# Patient Record
Sex: MALE | Race: WHITE | NOT HISPANIC OR LATINO | Employment: OTHER | URBAN - METROPOLITAN AREA
[De-identification: names, ages, dates, MRNs, and addresses within clinical notes are randomized per-mention and may not be internally consistent; named-entity substitution may affect disease eponyms.]

---

## 2017-04-06 ENCOUNTER — HOSPITAL ENCOUNTER (INPATIENT)
Facility: HOSPITAL | Age: 57
LOS: 1 days | Discharge: HOME | DRG: 310 | End: 2017-04-07
Attending: EMERGENCY MEDICINE | Admitting: INTERNAL MEDICINE
Payer: COMMERCIAL

## 2017-04-06 DIAGNOSIS — R79.89 ELEVATED LFTS: ICD-10-CM

## 2017-04-06 DIAGNOSIS — R00.2 PALPITATIONS: ICD-10-CM

## 2017-04-06 DIAGNOSIS — I35.1 NONRHEUMATIC AORTIC VALVE INSUFFICIENCY: ICD-10-CM

## 2017-04-06 DIAGNOSIS — I35.9 AORTIC VALVE DISORDER: ICD-10-CM

## 2017-04-06 DIAGNOSIS — Z72.0 TOBACCO USE: ICD-10-CM

## 2017-04-06 DIAGNOSIS — I35.0 AORTIC STENOSIS, SEVERE: Chronic | ICD-10-CM

## 2017-04-06 DIAGNOSIS — I48.91 ATRIAL FIBRILLATION: ICD-10-CM

## 2017-04-06 DIAGNOSIS — F12.10 MARIJUANA ABUSE: ICD-10-CM

## 2017-04-06 DIAGNOSIS — R79.89 ELEVATED TROPONIN: ICD-10-CM

## 2017-04-06 DIAGNOSIS — F14.10 COCAINE ABUSE: ICD-10-CM

## 2017-04-06 DIAGNOSIS — Z78.9 ALCOHOL USE: ICD-10-CM

## 2017-04-06 DIAGNOSIS — I48.19 PERSISTENT ATRIAL FIBRILLATION WITH RVR: Primary | ICD-10-CM

## 2017-04-06 PROBLEM — R74.8 ELEVATED LIVER ENZYMES: Status: ACTIVE | Noted: 2017-04-06

## 2017-04-06 LAB
ALBUMIN SERPL BCP-MCNC: 3.2 G/DL
ALBUMIN SERPL BCP-MCNC: 4 G/DL
ALP SERPL-CCNC: 101 U/L
ALP SERPL-CCNC: 124 U/L
ALT SERPL W/O P-5'-P-CCNC: 100 U/L
ALT SERPL W/O P-5'-P-CCNC: 76 U/L
AMPHET+METHAMPHET UR QL: NEGATIVE
ANION GAP SERPL CALC-SCNC: 13 MMOL/L
AORTIC VALVE STENOSIS: ABNORMAL
AST SERPL-CCNC: 103 U/L
AST SERPL-CCNC: 73 U/L
BARBITURATES UR QL SCN>200 NG/ML: NEGATIVE
BASOPHILS # BLD AUTO: 0.1 K/UL
BASOPHILS NFR BLD: 1.1 %
BENZODIAZ UR QL SCN>200 NG/ML: NEGATIVE
BILIRUB DIRECT SERPL-MCNC: 0.5 MG/DL
BILIRUB SERPL-MCNC: 0.8 MG/DL
BILIRUB SERPL-MCNC: 1 MG/DL
BILIRUB UR QL STRIP: NEGATIVE
BNP SERPL-MCNC: 356 PG/ML
BUN SERPL-MCNC: 10 MG/DL
BZE UR QL SCN: NORMAL
CALCIUM SERPL-MCNC: 9.1 MG/DL
CANNABINOIDS UR QL SCN: NORMAL
CHLORIDE SERPL-SCNC: 96 MMOL/L
CLARITY UR REFRACT.AUTO: CLEAR
CO2 SERPL-SCNC: 28 MMOL/L
COLOR UR AUTO: YELLOW
CREAT SERPL-MCNC: 0.9 MG/DL
CREAT UR-MCNC: 78 MG/DL
DIASTOLIC DYSFUNCTION: NO
DIFFERENTIAL METHOD: ABNORMAL
EOSINOPHIL # BLD AUTO: 0.2 K/UL
EOSINOPHIL NFR BLD: 1.8 %
ERYTHROCYTE [DISTWIDTH] IN BLOOD BY AUTOMATED COUNT: 12.2 %
EST. GFR  (AFRICAN AMERICAN): >60 ML/MIN/1.73 M^2
EST. GFR  (NON AFRICAN AMERICAN): >60 ML/MIN/1.73 M^2
ESTIMATED PA SYSTOLIC PRESSURE: 44.99
ETHANOL SERPL-MCNC: <10 MG/DL
GLUCOSE SERPL-MCNC: 123 MG/DL
GLUCOSE UR QL STRIP: NEGATIVE
HCT VFR BLD AUTO: 41 %
HGB BLD-MCNC: 14.1 G/DL
HGB UR QL STRIP: NEGATIVE
INR PPP: 1.1
KETONES UR QL STRIP: ABNORMAL
LEUKOCYTE ESTERASE UR QL STRIP: NEGATIVE
LYMPHOCYTES # BLD AUTO: 4 K/UL
LYMPHOCYTES NFR BLD: 41.9 %
MCH RBC QN AUTO: 31.5 PG
MCHC RBC AUTO-ENTMCNC: 34.4 %
MCV RBC AUTO: 92 FL
METHADONE UR QL SCN>300 NG/ML: NEGATIVE
MONOCYTES # BLD AUTO: 1.3 K/UL
MONOCYTES NFR BLD: 13.2 %
NEUTROPHILS # BLD AUTO: 4 K/UL
NEUTROPHILS NFR BLD: 41.8 %
NITRITE UR QL STRIP: NEGATIVE
OPIATES UR QL SCN: NEGATIVE
PCP UR QL SCN>25 NG/ML: NEGATIVE
PH UR STRIP: 6 [PH] (ref 5–8)
PLATELET # BLD AUTO: 225 K/UL
PMV BLD AUTO: 9.8 FL
POTASSIUM SERPL-SCNC: 4.1 MMOL/L
PROT SERPL-MCNC: 6.7 G/DL
PROT SERPL-MCNC: 8.2 G/DL
PROT UR QL STRIP: NEGATIVE
PROTHROMBIN TIME: 11.8 SEC
RBC # BLD AUTO: 4.47 M/UL
RETIRED EF AND QEF - SEE NOTES: 55 (ref 55–65)
SODIUM SERPL-SCNC: 137 MMOL/L
SP GR UR STRIP: 1.02 (ref 1–1.03)
TOXICOLOGY INFORMATION: NORMAL
TRICUSPID VALVE REGURGITATION: ABNORMAL
TROPONIN I SERPL DL<=0.01 NG/ML-MCNC: 0.04 NG/ML
URN SPEC COLLECT METH UR: ABNORMAL
UROBILINOGEN UR STRIP-ACNC: <2 EU/DL
WBC # BLD AUTO: 9.48 K/UL

## 2017-04-06 PROCEDURE — 99291 CRITICAL CARE FIRST HOUR: CPT | Mod: 25

## 2017-04-06 PROCEDURE — 82570 ASSAY OF URINE CREATININE: CPT

## 2017-04-06 PROCEDURE — 84484 ASSAY OF TROPONIN QUANT: CPT | Mod: 91

## 2017-04-06 PROCEDURE — 25000003 PHARM REV CODE 250: Performed by: EMERGENCY MEDICINE

## 2017-04-06 PROCEDURE — 93010 ELECTROCARDIOGRAM REPORT: CPT | Mod: 77,,, | Performed by: INTERNAL MEDICINE

## 2017-04-06 PROCEDURE — 99406 BEHAV CHNG SMOKING 3-10 MIN: CPT

## 2017-04-06 PROCEDURE — 99223 1ST HOSP IP/OBS HIGH 75: CPT | Mod: ,,, | Performed by: INTERNAL MEDICINE

## 2017-04-06 PROCEDURE — 93005 ELECTROCARDIOGRAM TRACING: CPT

## 2017-04-06 PROCEDURE — 93306 TTE W/DOPPLER COMPLETE: CPT | Mod: 26,,, | Performed by: INTERNAL MEDICINE

## 2017-04-06 PROCEDURE — 80320 DRUG SCREEN QUANTALCOHOLS: CPT

## 2017-04-06 PROCEDURE — 81003 URINALYSIS AUTO W/O SCOPE: CPT

## 2017-04-06 PROCEDURE — 63600175 PHARM REV CODE 636 W HCPCS: Performed by: EMERGENCY MEDICINE

## 2017-04-06 PROCEDURE — 85025 COMPLETE CBC W/AUTO DIFF WBC: CPT

## 2017-04-06 PROCEDURE — 96365 THER/PROPH/DIAG IV INF INIT: CPT

## 2017-04-06 PROCEDURE — 99900035 HC TECH TIME PER 15 MIN (STAT)

## 2017-04-06 PROCEDURE — 83880 ASSAY OF NATRIURETIC PEPTIDE: CPT

## 2017-04-06 PROCEDURE — 96366 THER/PROPH/DIAG IV INF ADDON: CPT

## 2017-04-06 PROCEDURE — 25000003 PHARM REV CODE 250: Performed by: NURSE PRACTITIONER

## 2017-04-06 PROCEDURE — 84484 ASSAY OF TROPONIN QUANT: CPT

## 2017-04-06 PROCEDURE — 80076 HEPATIC FUNCTION PANEL: CPT

## 2017-04-06 PROCEDURE — 11000001 HC ACUTE MED/SURG PRIVATE ROOM

## 2017-04-06 PROCEDURE — 85610 PROTHROMBIN TIME: CPT

## 2017-04-06 PROCEDURE — 96375 TX/PRO/DX INJ NEW DRUG ADDON: CPT

## 2017-04-06 PROCEDURE — 80053 COMPREHEN METABOLIC PANEL: CPT

## 2017-04-06 PROCEDURE — 93306 TTE W/DOPPLER COMPLETE: CPT

## 2017-04-06 PROCEDURE — 36415 COLL VENOUS BLD VENIPUNCTURE: CPT

## 2017-04-06 PROCEDURE — 63600175 PHARM REV CODE 636 W HCPCS: Performed by: NURSE PRACTITIONER

## 2017-04-06 PROCEDURE — 93010 ELECTROCARDIOGRAM REPORT: CPT | Mod: ,,, | Performed by: NUCLEAR MEDICINE

## 2017-04-06 RX ORDER — CHLORDIAZEPOXIDE HYDROCHLORIDE 10 MG/1
10 CAPSULE, GELATIN COATED ORAL 3 TIMES DAILY
Status: DISCONTINUED | OUTPATIENT
Start: 2017-04-06 | End: 2017-04-07 | Stop reason: HOSPADM

## 2017-04-06 RX ORDER — DILTIAZEM HCL/D5W 125 MG/125
10 PLASTIC BAG, INJECTION (ML) INTRAVENOUS CONTINUOUS
Status: DISCONTINUED | OUTPATIENT
Start: 2017-04-06 | End: 2017-04-06

## 2017-04-06 RX ORDER — DILTIAZEM HYDROCHLORIDE 5 MG/ML
10 INJECTION INTRAVENOUS
Status: ACTIVE | OUTPATIENT
Start: 2017-04-06 | End: 2017-04-06

## 2017-04-06 RX ORDER — NAPROXEN SODIUM 220 MG/1
162 TABLET, FILM COATED ORAL
Status: COMPLETED | OUTPATIENT
Start: 2017-04-06 | End: 2017-04-06

## 2017-04-06 RX ORDER — ENOXAPARIN SODIUM 100 MG/ML
40 INJECTION SUBCUTANEOUS EVERY 24 HOURS
Status: DISCONTINUED | OUTPATIENT
Start: 2017-04-06 | End: 2017-04-07 | Stop reason: HOSPADM

## 2017-04-06 RX ORDER — ASPIRIN 325 MG
325 TABLET ORAL
Status: DISCONTINUED | OUTPATIENT
Start: 2017-04-06 | End: 2017-04-06

## 2017-04-06 RX ORDER — SODIUM CHLORIDE 9 MG/ML
1000 INJECTION, SOLUTION INTRAVENOUS
Status: COMPLETED | OUTPATIENT
Start: 2017-04-06 | End: 2017-04-06

## 2017-04-06 RX ORDER — DEXTROSE MONOHYDRATE AND SODIUM CHLORIDE 5; .45 G/100ML; G/100ML
INJECTION, SOLUTION INTRAVENOUS CONTINUOUS
Status: DISCONTINUED | OUTPATIENT
Start: 2017-04-06 | End: 2017-04-06

## 2017-04-06 RX ORDER — DILTIAZEM HYDROCHLORIDE 5 MG/ML
10 INJECTION INTRAVENOUS
Status: COMPLETED | OUTPATIENT
Start: 2017-04-06 | End: 2017-04-06

## 2017-04-06 RX ORDER — DILTIAZEM HYDROCHLORIDE 120 MG/1
120 CAPSULE, COATED, EXTENDED RELEASE ORAL EVERY 12 HOURS
Status: DISCONTINUED | OUTPATIENT
Start: 2017-04-06 | End: 2017-04-07 | Stop reason: HOSPADM

## 2017-04-06 RX ORDER — DILTIAZEM HYDROCHLORIDE 5 MG/ML
10 INJECTION INTRAVENOUS
Status: DISCONTINUED | OUTPATIENT
Start: 2017-04-06 | End: 2017-04-06

## 2017-04-06 RX ADMIN — CHLORDIAZEPOXIDE HYDROCHLORIDE 10 MG: 10 CAPSULE ORAL at 10:04

## 2017-04-06 RX ADMIN — DILTIAZEM HYDROCHLORIDE 10 MG/HR: 5 INJECTION INTRAVENOUS at 05:04

## 2017-04-06 RX ADMIN — SODIUM CHLORIDE 1000 ML: 0.9 INJECTION, SOLUTION INTRAVENOUS at 06:04

## 2017-04-06 RX ADMIN — ENOXAPARIN SODIUM 40 MG: 100 INJECTION SUBCUTANEOUS at 04:04

## 2017-04-06 RX ADMIN — SODIUM CHLORIDE 1000 ML: 0.9 INJECTION, SOLUTION INTRAVENOUS at 05:04

## 2017-04-06 RX ADMIN — NIFEDIPINE 120 MG: 10 CAPSULE ORAL at 04:04

## 2017-04-06 RX ADMIN — ASPIRIN 81 MG 162 MG: 81 TABLET ORAL at 05:04

## 2017-04-06 RX ADMIN — FOLIC ACID: 5 INJECTION, SOLUTION INTRAMUSCULAR; INTRAVENOUS; SUBCUTANEOUS at 10:04

## 2017-04-06 RX ADMIN — DILTIAZEM HYDROCHLORIDE 10 MG: 5 INJECTION INTRAVENOUS at 05:04

## 2017-04-06 NOTE — ED PROVIDER NOTES
"Encounter Date: 4/6/2017       History     Chief Complaint   Patient presents with    Palpitations     Pt states "feels like my heart is racing and beating erradic".     Review of patient's allergies indicates:  No Known Allergies  Patient is a 56 y.o. male presenting with the following complaint: chest pain. The history is provided by the patient.   Chest Pain   The current episode started 1 to 2 hours ago. Chest pain occurs constantly. The chest pain is unchanged. The pain is associated with drug use. The quality of the pain is described as pressure-like. The pain does not radiate. Chest pain is worsened by smoking. Primary symptoms include palpitations. Pertinent negatives for primary symptoms include no fever, no fatigue, no syncope, no shortness of breath, no cough, no wheezing, no abdominal pain, no nausea, no vomiting, no dizziness and no altered mental status.   The palpitations began 1 to 2 hours ago. The onset of the palpitations was sudden. An episode of palpitations lasts for more than 30 minutes. The palpitations have occurred 1 time(s). The palpitations occur while in bed. The palpitations did not occur with syncope, dizziness or shortness of breath.     Pertinent negatives for associated symptoms include no claudication, no diaphoresis, no lower extremity edema, no near-syncope, no numbness, no orthopnea, no paroxysmal nocturnal dyspnea and no weakness. He tried nothing for the symptoms. Risk factors include alcohol intake, male gender, being elderly, obesity, substance abuse and sedentary lifestyle (pt states he drank some alcohol and snorted some cocaine yesterday.  no hx of heart issues.  pt does not take any rx medications.  never had a cardiac work up.  ).   His past medical history is significant for stimulant use (snorted cocaine yesterday).   Pertinent negatives for past medical history include no aneurysm, no anxiety/panic attacks, no aortic aneurysm, no aortic dissection, no arrhythmia, no " bicuspid aortic valve, no CAD, no cancer, no congenital heart disease, no connective tissue disease, no COPD, no CHF, no diabetes, no DVT, no hyperlipidemia, no hypertension, no Marfan's syndrome, no MI, no mitral valve prolapse, no pacemaker, no PE, no PVD, no recent injury, no rheumatic fever, no seizures, no sickle cell disease, no sleep apnea, no spontaneous pneumothorax, no strokes, no thyroid problem, no TIA and no valve disorder.   His family medical history is significant for CAD, diabetes, heart disease and hypertension.   Pertinent negatives for family medical history include: no early MI, no PE, no sickle cell disease, no stroke, no sudden death and no TIA.   Procedure history is negative for cardiac catheterization, echocardiogram, persantine thallium, stress echo, stress thallium, exercise treadmill test and coronary CTA.     History reviewed. No pertinent past medical history.  Past Surgical History:   Procedure Laterality Date    APPENDECTOMY       History reviewed. No pertinent family history.  Social History   Substance Use Topics    Smoking status: Current Every Day Smoker     Packs/day: 1.50     Types: Cigarettes    Smokeless tobacco: None    Alcohol use Yes      Comment: daily     Review of Systems   Constitutional: Negative for diaphoresis, fatigue and fever.   HENT: Negative for sore throat.    Respiratory: Negative for cough, shortness of breath and wheezing.    Cardiovascular: Positive for chest pain and palpitations. Negative for orthopnea, claudication, syncope and near-syncope.   Gastrointestinal: Negative for abdominal pain, nausea and vomiting.   Genitourinary: Negative for dysuria.   Musculoskeletal: Negative for back pain.   Skin: Negative for rash.   Neurological: Negative for dizziness, seizures, syncope, weakness and numbness.   Hematological: Does not bruise/bleed easily.   All other systems reviewed and are negative.      Physical Exam   Initial Vitals   BP Pulse Resp Temp  SpO2   04/06/17 0452 04/06/17 0452 04/06/17 0452 04/06/17 0452 04/06/17 0452   206/112 124 20 97.7 °F (36.5 °C) 96 %     Physical Exam    Nursing note and vitals reviewed.  Constitutional: He appears well-developed and well-nourished. He is not diaphoretic. No distress.   HENT:   Head: Normocephalic and atraumatic.   Eyes: EOM are normal. Pupils are equal, round, and reactive to light. No scleral icterus.   Neck: Normal range of motion. Neck supple. No thyromegaly present.   Cardiovascular: Normal heart sounds and intact distal pulses. An irregularly irregular rhythm present. Tachycardia present.  Exam reveals no gallop and no friction rub.    No murmur heard.  A fib c rvr   Pulmonary/Chest: Effort normal and breath sounds normal. No respiratory distress. He has no wheezes. He has no rhonchi. He exhibits no tenderness.   Abdominal: Soft. Bowel sounds are normal. He exhibits no distension. There is no tenderness. There is no rebound and no guarding.   Musculoskeletal: Normal range of motion. He exhibits no edema or tenderness.   Lymphadenopathy:     He has no cervical adenopathy.   Neurological: He is alert and oriented to person, place, and time. He has normal strength. No cranial nerve deficit or sensory deficit.   Skin: Skin is warm and dry.   Psychiatric: He has a normal mood and affect. His behavior is normal. Judgment and thought content normal.         ED Course   Procedures  Labs Reviewed   CBC W/ AUTO DIFFERENTIAL - Abnormal; Notable for the following:        Result Value    RBC 4.47 (*)     MCH 31.5 (*)     Mono # 1.3 (*)     All other components within normal limits    Narrative:     PLEASE REVIEW ORDER START TIME BEFORE MARKING SPECIMEN  COLLECTED.   COMPREHENSIVE METABOLIC PANEL - Abnormal; Notable for the following:     Glucose 123 (*)      (*)      (*)     All other components within normal limits    Narrative:     PLEASE REVIEW ORDER START TIME BEFORE MARKING SPECIMEN  COLLECTED.    TROPONIN I - Abnormal; Notable for the following:     Troponin I 0.043 (*)     All other components within normal limits    Narrative:     PLEASE REVIEW ORDER START TIME BEFORE MARKING SPECIMEN  COLLECTED.   B-TYPE NATRIURETIC PEPTIDE - Abnormal; Notable for the following:      (*)     All other components within normal limits    Narrative:     PLEASE REVIEW ORDER START TIME BEFORE MARKING SPECIMEN  COLLECTED.   PROTIME-INR    Narrative:     PLEASE REVIEW ORDER START TIME BEFORE MARKING SPECIMEN  COLLECTED.   ALCOHOL,MEDICAL (ETHANOL)   ALCOHOL,MEDICAL (ETHANOL)    Narrative:     PLEASE REVIEW ORDER START TIME BEFORE MARKING SPECIMEN  COLLECTED.   URINALYSIS   DRUG SCREEN PANEL, URINE EMERGENCY     EKG Readings: (Independently Interpreted)   Initial Reading: No STEMI. Rhythm: Atrial Fibrillation. Heart Rate: 128. Ectopy: No Ectopy. Conduction: Normal. ST Segments: Normal ST Segments. T Waves: Normal. Axis: Normal. Clinical Impression: Atrial Fibrillation with RVR       Vitals:    04/06/17 0452 04/06/17 0515   BP: (!) 206/112 (!) 175/110   Pulse: (!) 124    Resp: 20    Temp: 97.7 °F (36.5 °C)    TempSrc: Oral    SpO2: 96%    Weight: 112 kg (247 lb)        Results for orders placed or performed during the hospital encounter of 04/06/17   CBC auto differential   Result Value Ref Range    WBC 9.48 3.90 - 12.70 K/uL    RBC 4.47 (L) 4.60 - 6.20 M/uL    Hemoglobin 14.1 14.0 - 18.0 g/dL    Hematocrit 41.0 40.0 - 54.0 %    MCV 92 82 - 98 fL    MCH 31.5 (H) 27.0 - 31.0 pg    MCHC 34.4 32.0 - 36.0 %    RDW 12.2 11.5 - 14.5 %    Platelets 225 150 - 350 K/uL    MPV 9.8 9.2 - 12.9 fL    Gran # 4.0 1.8 - 7.7 K/uL    Lymph # 4.0 1.0 - 4.8 K/uL    Mono # 1.3 (H) 0.3 - 1.0 K/uL    Eos # 0.2 0.0 - 0.5 K/uL    Baso # 0.10 0.00 - 0.20 K/uL    Gran% 41.8 38.0 - 73.0 %    Lymph% 41.9 18.0 - 48.0 %    Mono% 13.2 4.0 - 15.0 %    Eosinophil% 1.8 0.0 - 8.0 %    Basophil% 1.1 0.0 - 1.9 %    Differential Method Automated     Comprehensive metabolic panel   Result Value Ref Range    Sodium 137 136 - 145 mmol/L    Potassium 4.1 3.5 - 5.1 mmol/L    Chloride 96 95 - 110 mmol/L    CO2 28 23 - 29 mmol/L    Glucose 123 (H) 70 - 110 mg/dL    BUN, Bld 10 6 - 20 mg/dL    Creatinine 0.9 0.5 - 1.4 mg/dL    Calcium 9.1 8.7 - 10.5 mg/dL    Total Protein 8.2 6.0 - 8.4 g/dL    Albumin 4.0 3.5 - 5.2 g/dL    Total Bilirubin 1.0 0.1 - 1.0 mg/dL    Alkaline Phosphatase 124 55 - 135 U/L     (H) 10 - 40 U/L     (H) 10 - 44 U/L    Anion Gap 13 8 - 16 mmol/L    eGFR if African American >60.0 >60 mL/min/1.73 m^2    eGFR if non African American >60.0 >60 mL/min/1.73 m^2   Protime-INR   Result Value Ref Range    Prothrombin Time 11.8 9.0 - 12.5 sec    INR 1.1 0.8 - 1.2   Troponin I   Result Value Ref Range    Troponin I 0.043 (H) 0.000 - 0.026 ng/mL   B-Type natriuretic peptide (BNP)   Result Value Ref Range     (H) 0 - 99 pg/mL   Ethanol   Result Value Ref Range    Alcohol, Medical, Serum <10 <10 mg/dL         Imaging Results         X-Ray Chest 1 View (In process)           Medications   diltiaZEM injection 10 mg (10 mg Intravenous Not Given 4/6/17 0530)   diltiaZEM 125 mg in dextrose 5% 125 mL infusion (non-titrating) (10 mg/hr Intravenous New Bag 4/6/17 0551)   diltiaZEM injection 10 mg (10 mg Intravenous Not Given 4/6/17 0600)   aspirin chewable tablet 162 mg (162 mg Oral Given 4/6/17 0519)   diltiaZEM injection 10 mg (10 mg Intravenous Given 4/6/17 0515)   0.9%  NaCl infusion (1,000 mLs Intravenous New Bag 4/6/17 0534)     6:00 AM - Transfer of Care: Patient care transferred from Dr. Alicea to Dr. Bond, pending labs and reevaluation of Afib c RVR.      Pre-hypertension/Hypertension: The pt has been informed that they may have pre-hypertension or hypertension based on a blood pressure reading in the ED. I recommend that the pt call the PCP listed on their discharge instructions or a physician of their choice this week to arrange  f/u for further evaluation of possible pre-hypertension or hypertension.           New Prescriptions    No medications on file          ED Diagnosis  1. Persistent atrial fibrillation with RVR    2. Palpitations    3. Elevated LFTs    4. Cocaine abuse    5. Marijuana abuse    6. Elevated troponin    7. Alcohol use    8. Tobacco use           Medical Decision Making:   Initial Assessment:   Patient received from Dr Alicea at 0600 following discussion of all pertinent details of the encounter.  At this point the patients HR has improved into the 110-120s with the use of a cardizem drip though he remains in Afib.  He is asymptomatic at present.  We have discussed with him possible causes of his Afib emphasizing our concerns for his cocaine abuse and the additional risks of ACS associated with it.  Anticipating admission, we placed a call to hospital medicine at 0713 and are currently awaiting callback.  Michael Bond MD  7:31 AM    Second page placed.  Michael Bond MD  7:32 AM      ED Management:  All historical, clinical, radiographic, and laboratory findings were reviewed with the patient/family in detail along with the indications for transport to the facility in [place] in order to receive cardiac monitoring, cardiology consultation, and serial troponin levels along with Cardizem drip.  All remaining questions and concerns were addressed at this time and the patient/family agrees to proceed accordingly.  Similarly, all pertinent details of the encounter were discussed with Dr. Neely who agrees to receive the patient at [facility] for further care as outlined above.  The patient will be transferred by [facility] ambulance services secondary to a need for ongoing cardiac monitoring and IVF and IV Cardizem infusion en route.  Michael Bond MD  7:56 AM                       ED Course     Clinical Impression:   The primary encounter diagnosis was Persistent atrial fibrillation with RVR.  Diagnoses of Palpitations, Elevated LFTs, Cocaine abuse, Marijuana abuse, Elevated troponin, Alcohol use, and Tobacco use were also pertinent to this visit.          Michael Bond MD  04/06/17 0755

## 2017-04-06 NOTE — ED NOTES
"Pt c/o palpitations  & chest tightness/pressure x past 1 hr. Denies chest pain/tightness at this time.  Denies radiation of pain or SOB. States he has no medical hx & doesn't go to the doctor.  Provides hx of "did cocaine last night for the first time in 8 yrs". Will continue to monitorPatient verbally verified and Spelled Full Name and Date of Birth. LOC: The patient is awake, alert and aware of environment with an appropriate affect, the patient is oriented x 3 and speaking appropriately.  APPEARANCE: Patient resting comfortably and in no acute distress, patient is clean and well groomed, patient's clothing is properly fastened.  HEENT: Brief WNL  SKIN: warm & dry   MUSCULOSKELETAL: Brief WNL  RESPIRATORY: chest sounds clear arnulfo, hx 1.5 ppd smoker x 35-40 yrs.  Denies cough/cold  CARDIAC: AFibw/RVR at 134/min  GASTRO: Denies N or V  : Brief WNL  Peripheral Vasc: No peripheral edema but pt states "my feet & ankles swell up sometime"  NEURO: Brief WNL  PSYCH: Brief WNL  "

## 2017-04-06 NOTE — ED NOTES
Pt resting in bed. No complaints at present. SR up x 2, bed locked and low, call light in reach. Instructed to call for assistance. Will monitor.

## 2017-04-06 NOTE — ASSESSMENT & PLAN NOTE
-2. ETOH abuse  -Banana Bag  -librium 10 mg TID  -Folic Acid  -Multivitiamin  -thiamine  -ETOH counseling resources

## 2017-04-06 NOTE — SUBJECTIVE & OBJECTIVE
Past Medical History:   Diagnosis Date    Tobacco abuse        Past Surgical History:   Procedure Laterality Date    APPENDECTOMY         Review of patient's allergies indicates:  No Known Allergies    No current facility-administered medications on file prior to encounter.      Current Outpatient Prescriptions on File Prior to Encounter   Medication Sig    [DISCONTINUED] hydrocodone-acetaminophen 10-325mg (NORCO)  mg Tab Take 1 tablet by mouth every 4 (four) hours as needed for Pain.     Family History     Problem Relation (Age of Onset)    Valvular heart disease Father, Brother (27)        Social History Main Topics    Smoking status: Current Every Day Smoker     Packs/day: 1.50     Years: 40.00     Types: Cigarettes    Smokeless tobacco: Not on file    Alcohol use Yes      Comment: daily - 6 pack beer    Drug use: Yes     Special: Marijuana, Cocaine    Sexual activity: Not on file     Review of Systems   Constitutional: Negative.  Negative for activity change, appetite change, chills, diaphoresis, fatigue, fever and unexpected weight change.   HENT: Negative for congestion, ear discharge, ear pain, facial swelling, hearing loss, postnasal drip, sore throat, tinnitus, trouble swallowing and voice change.    Eyes: Negative for photophobia, pain, discharge, redness, itching and visual disturbance.   Respiratory: Positive for shortness of breath. Negative for cough, choking, chest tightness, wheezing and stridor.    Cardiovascular: Positive for chest pain and palpitations. Negative for leg swelling.   Gastrointestinal: Negative for abdominal distention, abdominal pain, blood in stool, constipation, diarrhea, nausea and vomiting.   Endocrine: Negative for cold intolerance, heat intolerance, polydipsia, polyphagia and polyuria.   Genitourinary: Negative for difficulty urinating, flank pain, frequency, hematuria and urgency.   Musculoskeletal: Negative for arthralgias, back pain, gait problem and myalgias.    Skin: Negative for color change, pallor, rash and wound.   Neurological: Negative for dizziness, tremors, seizures, syncope, facial asymmetry, speech difficulty, weakness, light-headedness, numbness and headaches.   Hematological: Negative for adenopathy. Does not bruise/bleed easily.   Psychiatric/Behavioral: Negative for agitation, confusion, hallucinations and suicidal ideas. The patient is not nervous/anxious.    All other systems reviewed and are negative.    Objective:     Vital Signs (Most Recent):  Temp: 98.3 °F (36.8 °C) (04/06/17 1500)  Pulse: 76 (04/06/17 1500)  Resp: 18 (04/06/17 1500)  BP: (!) 143/79 (04/06/17 1500)  SpO2: 97 % (04/06/17 1500) Vital Signs (24h Range):  Temp:  [97.7 °F (36.5 °C)-98.3 °F (36.8 °C)] 98.3 °F (36.8 °C)  Pulse:  [] 76  Resp:  [14-29] 18  SpO2:  [95 %-99 %] 97 %  BP: (101-206)/() 143/79     Weight: 112 kg (246 lb 14.6 oz)  Body mass index is 36.46 kg/(m^2).    Physical Exam   Constitutional: He is oriented to person, place, and time. He appears well-developed and well-nourished.   HENT:   Head: Normocephalic and atraumatic.   Nose: Nose normal.   Eyes: Conjunctivae and EOM are normal. Pupils are equal, round, and reactive to light.   Neck: Normal range of motion. Neck supple. No JVD present. No tracheal deviation present. No thyromegaly present.   Cardiovascular: Normal rate, regular rhythm, normal heart sounds and intact distal pulses.  Exam reveals no gallop and no friction rub.    No murmur heard.  Pulmonary/Chest: Effort normal. No stridor. No respiratory distress. He has wheezes. He has no rales. He exhibits no tenderness.   Diminished breathe sounds   Abdominal: Soft. Bowel sounds are normal. He exhibits no distension. There is no tenderness. There is no rebound and no guarding.   Musculoskeletal: Normal range of motion. He exhibits no edema, tenderness or deformity.   Neurological: He is alert and oriented to person, place, and time. He has normal  reflexes. No cranial nerve deficit. Coordination normal.   Skin: Skin is warm and dry. No rash noted. No erythema. No pallor.   Psychiatric: He has a normal mood and affect. His behavior is normal. Judgment and thought content normal.   Nursing note and vitals reviewed.       Significant Labs:   CBC:   Recent Labs  Lab 04/06/17  0500   WBC 9.48   HGB 14.1   HCT 41.0        CMP:   Recent Labs  Lab 04/06/17  0500      K 4.1   CL 96   CO2 28   *   BUN 10   CREATININE 0.9   CALCIUM 9.1   PROT 8.2   ALBUMIN 4.0   BILITOT 1.0   ALKPHOS 124   *   *   ANIONGAP 13   EGFRNONAA >60.0       Significant Imaging: I have reviewed all pertinent imaging results/findings within the past 24 hours.   Imaging Results         X-Ray Chest 1 View (Final result) Result time:  04/06/17 08:41:35    Final result by Nixon Huitron MD (04/06/17 08:41:35)    Impression:         No acute findings.      Electronically signed by: NIXON HUITRON MD  Date:     04/06/17  Time:    08:41     Narrative:    Exam: XR CHEST 1 VIEW,    Date:  04/06/17 05:09:02    History: palpitations    Comparison:  No prior  studies available for comparison.    Findings:     Lungs clear.  Cardiac silhouette mildly enlarged.  Vascular calcification of aorta.  No significant bony findings.

## 2017-04-06 NOTE — CONSULTS
"Consult Note  Cardiology     Consult Requested By: Dr. Bond  Reason for Consult: New onset afib with RVR    SUBJECTIVE:     History of Present Illness:  Patient is a 56 y.o. male with a PMHx of tobacco and ETOH abuse (drinks 4-6 beers daily) who presented to [facility] ED today with a chief complaint of palpitations that started shortly PTA. Patient reportedly had a party at his home last night and used cocaine and drank. He went to bed, but awoke six hours later, and felt his heart "racing". Other associated symptoms included mild chest pressure and SOB, and feeling flushed. Patient denied any associated lightheadedness, dizziness, near syncope, or syncope. EKG performed in ED revealed new onset afib with RVR and patient was subsequently transferred to [facility] for further evaluation and treatment. Cardiology consulted to assist with management. Patient seen and examined today, resting in bed. Reports palpitations and SOB have improved. Denies chest pain. No significant cardiac history. He does report familial history of CAD and valvular heart disease. States his father had an MI and subsequent valve replacement in his 40's. Two brothers, required valve replacements as well, although one did not survive the surgery. Chart reviewed. BP markedly elevated upon arrival to ED (206/112). Initial troponin 0.038. UDS + for cocaine and THC. 2D echo pending.     Review of patient's allergies indicates:  No Known Allergies    Past Medical History:   Diagnosis Date    Tobacco abuse      Past Surgical History:   Procedure Laterality Date    APPENDECTOMY       Family History   Problem Relation Age of Onset    Valvular heart disease Father      66 yo    Heart attacks under age 50 Father 42    Valvular heart disease Brother 27     valve Replacement surgery    Valvular heart disease Brother 62     valvue Replacement     Social History   Substance Use Topics    Smoking status: Current Every Day Smoker     Packs/day: 1.50     " Years: 40.00     Types: Cigarettes    Smokeless tobacco: None    Alcohol use Yes      Comment: daily - 6 pack beer        Review of Systems:  Constitutional: negative  Eyes: negative  Ears, nose, mouth, throat, and face: negative  Respiratory: +SOB  Cardiovascular: +palpitations, mild chest pressure  Gastrointestinal: negative  Genitourinary:negative  Hematologic/lymphatic: negative  Musculoskeletal:negative,   Neurological: negative  Behavioral/Psych: negative  Endocrine: negative  Allergic/Immunologic: negative    OBJECTIVE:     Vital Signs (Most Recent)  Temp: 98.3 °F (36.8 °C) (04/06/17 1500)  Pulse: 76 (04/06/17 1500)  Resp: 18 (04/06/17 1500)  BP: (!) 143/79 (04/06/17 1500)  SpO2: 97 % (04/06/17 1500)    Vital Signs Range (Last 24H):  Temp:  [97.7 °F (36.5 °C)-98.3 °F (36.8 °C)]   Pulse:  []   Resp:  [14-29]   BP: (101-206)/()   SpO2:  [95 %-99 %]     Current Facility-Administered Medications   Medication    diltiaZEM 24 hr capsule 120 mg    diltiaZEM injection 10 mg    diltiaZEM injection 10 mg    enoxaparin injection 40 mg    [START ON 4/7/2017] sodium chloride 0.9% 1,000 mL with mvi, adult no.4 with vit K 3,300 unit- 150 mcg/10 mL 10 mL, thiamine 100 mg, folic acid 1 mg infusion     No current facility-administered medications on file prior to encounter.      Current Outpatient Prescriptions on File Prior to Encounter   Medication Sig    [DISCONTINUED] hydrocodone-acetaminophen 10-325mg (NORCO)  mg Tab Take 1 tablet by mouth every 4 (four) hours as needed for Pain.       Physical Exam:  General appearance: alert, appears stated age and cooperative  Head: Normocephalic, without obvious abnormality, atraumatic  Neck: no adenopathy, no carotid bruit, no JVD  Lungs:  clear to auscultation bilaterally  Chest wall: no tenderness  Heart: irregularly irregular rate and rhythm S1, S2 normal, +systolic murmur ULSB  Abdomen: soft, non-tender; bowel sounds normal; no masses,  no  organomegaly  Extremities: extremities normal, atraumatic, no cyanosis or edema  Skin: Skin color, texture, turgor normal. No rashes or lesions  Neurologic: Grossly normal, AAO x 3    Laboratory:  Chemistry:   Lab Results   Component Value Date     04/06/2017    K 4.1 04/06/2017    CL 96 04/06/2017    CO2 28 04/06/2017    BUN 10 04/06/2017    CREATININE 0.9 04/06/2017    CALCIUM 9.1 04/06/2017     Cardiac Markers:   Lab Results   Component Value Date    TROPONINI 0.038 (H) 04/06/2017     Cardiac Markers (Last 3):   Lab Results   Component Value Date    TROPONINI 0.038 (H) 04/06/2017    TROPONINI 0.043 (H) 04/06/2017     CBC:   Lab Results   Component Value Date    WBC 9.48 04/06/2017    HGB 14.1 04/06/2017    HCT 41.0 04/06/2017    MCV 92 04/06/2017     04/06/2017     Lipids: No results found for: CHOL, TRIG, HDL, LDLDIRECT  Coagulation:   Lab Results   Component Value Date    INR 1.1 04/06/2017       Diagnostic Results:  ECG: Reviewed  X-Ray: Reviewed  Echo: pending      ASSESSMENT/PLAN:     Patient Active Problem List   Diagnosis    Persistent atrial fibrillation with RVR    Cocaine abuse    Tobacco use    Alcohol use    Elevated troponin    Elevated liver enzymes      Patient who presents with new onset atrial fibrillation with RVR in setting of ETOH abuse and recent cocaine use. HR better controlled at time of exam, ok to switch to po cardizem. Add ASA 81 mg for now. Trend troponin. Check 2D echo. Patient counseled on drug and ETOH cessation.   Plan:   -Ok to d/c cardizem gtt; start cardizem 120 mg BID  -ASA 81 mg daily  -Trend troponin  -Check 2D echo  -Drug and ETOH cessation    Chart reviewed. Dr. Cook agrees with plan as outlined above.

## 2017-04-06 NOTE — IP AVS SNAPSHOT
[address] [place] [address]           Patient Discharge Instructions   Our goal is to set you up for success. This packet includes information on your condition, medications, and your home care.  It will help you care for yourself to prevent having to return to the hospital.     Please ask your nurse if you have any questions.      There are many details to remember when preparing to leave the hospital. Here is what you will need to do:    1. Take your medicine. If you are prescribed medications, review your Medication List on the following pages. You may have new medications to  at the pharmacy and others that you'll need to stop taking. Review the instructions for how and when to take your medications. Talk with your doctor or nurses if you are unsure of what to do.     2. Go to your follow-up appointments. Specific follow-up information is listed in the following pages. Your may be contacted by a nurse or clinical provider about future appointments. Be sure we have all of the phone numbers to reach you. Please contact your provider's office if you are unable to make an appointment.     3. Watch for warning signs. Your doctor or nurse will give you detailed warning signs to watch for and when to call for assistance. These instructions may also include educational information about your condition. If you experience any of warning signs to your health, call your doctor.               ** Verify the list of medication(s) below is accurate and up to date. Carry this with you in case of emergency. If your medications have changed, please notify your healthcare provider.             Medication List      START taking these medications        Additional Info                      aspirin 81 MG EC tablet   Commonly known as:  ECOTRIN   Refills:  0   Dose:  81 mg    Last time this was given:  81 mg on 4/7/2017  9:03 AM   Instructions:  Take 1 tablet (81 mg total) by  mouth once daily.     Begin Date    AM    Noon    PM    Bedtime       diltiaZEM 120 MG Cp24   Commonly known as:  CARDIZEM CD   Quantity:  60 capsule   Refills:  1   Dose:  120 mg    Last time this was given:  120 mg on 4/7/2017  9:03 AM   Instructions:  Take 1 capsule (120 mg total) by mouth every 12 (twelve) hours.     Begin Date    AM    Noon    PM    Bedtime            Where to Get Your Medications      These medications were sent to [facility] [address] [facility]  [address] [facility] [address] [address]     Phone:  [phone]     diltiaZEM 120 MG Cp24         You can get these medications from any pharmacy     You don't need a prescription for these medications     aspirin 81 MG EC tablet                  Please bring to all follow up appointments:    1. A copy of your discharge instructions.  2. All medicines you are currently taking in their original bottles.  3. Identification and insurance card.    Please arrive 15 minutes ahead of scheduled appointment time.    Please call 24 hours in advance if you must reschedule your appointment and/or time.        Your Scheduled Appointments     Apr 28, 2017 11:30 AM CDT   Established Patient Visit with Karson Cook MD   [facility] ([facility])    [address] [place] [id number] [phone]              Follow-up Information     Follow up with Karson Cook MD In 2 weeks.    Specialties:  Cardiology, Cardiovascular Disease    Why:  Hospital follow up.  Evaluation and planning for aortic valve repair.    Contact information:    [address] [facility] [address] [phone]          Discharge Instructions     Future Orders    Activity as tolerated     Diet Cardiac         Primary Diagnosis     Your primary diagnosis was:  Not on File      Admission Information     Date & Time Provider Department CSN    4/6/2017  4:53 AM Floyd Neely MD [facility] [id number]      Care Providers     Provider Role  "Specialty Primary office phone    Floyd Neely MD Attending Provider Internal Medicine [phone]    Floyd Neely MD Team Attending  Internal Medicine [phone]    Karson Cook MD Consulting Physician  Cardiology  [phone]      Your Vitals Were     BP Pulse Temp Resp Height Weight    161/78 (BP Location: Right arm, Patient Position: Lying, BP Method: Automatic) 72 98 °F (36.7 °C) (Oral) 18 5' 9" (1.753 m) 115 kg (253 lb 8.5 oz)    SpO2 BMI             98% 37.44 kg/m2         Recent Lab Values     No lab values to display.      Allergies as of 4/7/2017     No Known Allergies      [facility]     [facility] Nurse Care Line - 24/7 Assistance  Unless otherwise directed by your provider, please contact [facility], our nurse care line that is available for 24/7 assistance.     Registered nurses in the [facility] provide clinical advisement, health education, appointment booking, and other advisory services.  Call for this free service at [phone].        Advance Directives     An advance directive is a document which, in the event you are no longer able to make decisions for yourself, tells your healthcare team what kind of treatment you do or do not want to receive, or who you would like to make those decisions for you.  If you do not currently have an advance directive, [facility] encourages you to create one.  For more information call:  [phone] ([phone]), [phone] ([phone]),  or log on to [email or web address].        Smoking Cessation     If you would like to quit smoking:   You may be eligible for free services if you are a [place] resident and started smoking cigarettes before September 1, 1988.  Call the Smoking Cessation Trust (SCT) toll free at (663) 321-4244 or (423) 655-5140.   Call 6-940-QUIT-NOW if you do not meet the above criteria.   Contact us via email: [email or web address]   View our website for more information: " [email or web address]        Language Assistance Services     ATTENTION: Language assistance services are available, free of charge. Please call [phone].      ATENCIÓN: Si latosha julio, tiene a mcintyre disposición servicios gratuitos de asistencia lingüística. Llame al [phone].     CHÚ Ý: N?u b?n nói Ti?ng Vi?t, có các d?ch v? h? tr? ngôn ng? mi?n phí dành cho b?n. G?i s? [phone].        [facility] Sign-Up     Activating your [facility] account is as easy as 1-2-3!     1) Visit [email or web address], select Sign Up Now, enter this activation code and your date of birth, then select Next.  [email or web address]  Expires: 5/22/2017  2:08 PM      2) Create a username and password to use when you visit [facility] in the future and select a security question in case you lose your password and select Next.    3) Enter your e-mail address and click Sign Up!    Additional Information  If you have questions, please e-mail [email or web address] or call [phone] to talk to our [facility] staff. Remember, [facility] is NOT to be used for urgent needs. For medical emergencies, dial 911.          [facility] complies with applicable Federal civil rights laws and does not discriminate on the basis of race, color, national origin, age, disability, or sex.

## 2017-04-06 NOTE — H&P
"[facility] Hospital Medicine  History & Physical    Patient Name: Sukhjinder Kim  MRN: [id number]  Admission Date: 4/6/2017  Attending Physician: Floyd Neely MD   Primary Care Provider: Primary Doctor No         Patient information was obtained from patient, past medical records and ER records.     Subjective:     Principal Problem:Persistent atrial fibrillation with RVR    Chief Complaint:   Chief Complaint   Patient presents with    Palpitations     Pt states "feels like my heart is racing and beating erradic".        HPI: Sukhjinder Kim is a 57 yo male with PMHx of Tobacco and ETOH abuse, who presented to [facility] ER with palpitations. He used Cocaine last night, "first time in years" then 6 hours later, the palpitations woke him up from sleep. In ER, 206/112. ECG showed Atrial Fibrillation with RVR. Urine drug screen positive for Cocaine and Marijuana. He drinks 6 pack beer daily and 1.5 ppd cigarettes daily. He was placed on IV Cardizem which dropped his heart rate to the 80's. Associated s/s included chest pressure, SOB, flushed feeling. Cardiology has been consulted.     Past Medical History:   Diagnosis Date    Tobacco abuse        Past Surgical History:   Procedure Laterality Date    APPENDECTOMY         Review of patient's allergies indicates:  No Known Allergies    No current facility-administered medications on file prior to encounter.      Current Outpatient Prescriptions on File Prior to Encounter   Medication Sig    [DISCONTINUED] hydrocodone-acetaminophen 10-325mg (NORCO)  mg Tab Take 1 tablet by mouth every 4 (four) hours as needed for Pain.     Family History     Problem Relation (Age of Onset)    Valvular heart disease Father, Brother (27)        Social History Main Topics    Smoking status: Current Every Day Smoker     Packs/day: 1.50     Years: 40.00     Types: Cigarettes    Smokeless tobacco: Not on file    Alcohol use Yes      Comment: daily - 6 pack beer    " Drug use: Yes     Special: Marijuana, Cocaine    Sexual activity: Not on file     Review of Systems   Constitutional: Negative.  Negative for activity change, appetite change, chills, diaphoresis, fatigue, fever and unexpected weight change.   HENT: Negative for congestion, ear discharge, ear pain, facial swelling, hearing loss, postnasal drip, sore throat, tinnitus, trouble swallowing and voice change.    Eyes: Negative for photophobia, pain, discharge, redness, itching and visual disturbance.   Respiratory: Positive for shortness of breath. Negative for cough, choking, chest tightness, wheezing and stridor.    Cardiovascular: Positive for chest pain and palpitations. Negative for leg swelling.   Gastrointestinal: Negative for abdominal distention, abdominal pain, blood in stool, constipation, diarrhea, nausea and vomiting.   Endocrine: Negative for cold intolerance, heat intolerance, polydipsia, polyphagia and polyuria.   Genitourinary: Negative for difficulty urinating, flank pain, frequency, hematuria and urgency.   Musculoskeletal: Negative for arthralgias, back pain, gait problem and myalgias.   Skin: Negative for color change, pallor, rash and wound.   Neurological: Negative for dizziness, tremors, seizures, syncope, facial asymmetry, speech difficulty, weakness, light-headedness, numbness and headaches.   Hematological: Negative for adenopathy. Does not bruise/bleed easily.   Psychiatric/Behavioral: Negative for agitation, confusion, hallucinations and suicidal ideas. The patient is not nervous/anxious.    All other systems reviewed and are negative.    Objective:     Vital Signs (Most Recent):  Temp: 98.3 °F (36.8 °C) (04/06/17 1500)  Pulse: 76 (04/06/17 1500)  Resp: 18 (04/06/17 1500)  BP: (!) 143/79 (04/06/17 1500)  SpO2: 97 % (04/06/17 1500) Vital Signs (24h Range):  Temp:  [97.7 °F (36.5 °C)-98.3 °F (36.8 °C)] 98.3 °F (36.8 °C)  Pulse:  [] 76  Resp:  [14-29] 18  SpO2:  [95 %-99 %] 97 %  BP:  (101-206)/() 143/79     Weight: 112 kg (246 lb 14.6 oz)  Body mass index is 36.46 kg/(m^2).    Physical Exam   Constitutional: He is oriented to person, place, and time. He appears well-developed and well-nourished.   HENT:   Head: Normocephalic and atraumatic.   Nose: Nose normal.   Eyes: Conjunctivae and EOM are normal. Pupils are equal, round, and reactive to light.   Neck: Normal range of motion. Neck supple. No JVD present. No tracheal deviation present. No thyromegaly present.   Cardiovascular: Normal rate, regular rhythm, normal heart sounds and intact distal pulses.  Exam reveals no gallop and no friction rub.    2/6 murmur heard.  Pulmonary/Chest: Effort normal. No stridor. No respiratory distress. He has wheezes. He has no rales. He exhibits no tenderness.   Diminished breathe sounds   Abdominal: Soft. Bowel sounds are normal. He exhibits no distension. There is no tenderness. There is no rebound and no guarding.   Musculoskeletal: Normal range of motion. He exhibits no edema, tenderness or deformity.   Neurological: He is alert and oriented to person, place, and time. He has normal reflexes. No cranial nerve deficit. Coordination normal.   Skin: Skin is warm and dry. No rash noted. No erythema. No pallor.   Psychiatric: He has a normal mood and affect. His behavior is normal. Judgment and thought content normal.   Nursing note and vitals reviewed.       Significant Labs:   CBC:   Recent Labs  Lab 04/06/17  0500   WBC 9.48   HGB 14.1   HCT 41.0        CMP:   Recent Labs  Lab 04/06/17  0500      K 4.1   CL 96   CO2 28   *   BUN 10   CREATININE 0.9   CALCIUM 9.1   PROT 8.2   ALBUMIN 4.0   BILITOT 1.0   ALKPHOS 124   *   *   ANIONGAP 13   EGFRNONAA >60.0       Significant Imaging: I have reviewed all pertinent imaging results/findings within the past 24 hours.   Imaging Results         X-Ray Chest 1 View (Final result) Result time:  04/06/17 08:41:35    Final result by  Nixon Huitron MD (04/06/17 08:41:35)    Impression:         No acute findings.      Electronically signed by: NIXON HUITRON MD  Date:     04/06/17  Time:    08:41     Narrative:    Exam: XR CHEST 1 VIEW,    Date:  04/06/17 05:09:02    History: palpitations    Comparison:  No prior  studies available for comparison.    Findings:     Lungs clear.  Cardiac silhouette mildly enlarged.  Vascular calcification of aorta.  No significant bony findings.                Assessment/Plan:     * Persistent atrial fibrillation with RVR  -Cardiology consulted  -ECHO pending  -IV Cardizem drip  - NO BB      Cocaine abuse  - NO BB  -social service consult for rehab      Tobacco use  Tobacco abuse  -smoking cessation counseling  -REFUSED nicotine patch        Alcohol use  -2. ETOH abuse  -Banana Bag  -librium 10 mg TID  -Folic Acid  -Multivitiamin  -thiamine  -ETOH counseling resources      VTE Risk Mitigation         Ordered     enoxaparin injection 40 mg  Daily     Route:  Subcutaneous        04/06/17 1245     Medium Risk of VTE  Once      04/06/17 1245     Place MARY hose  Until discontinued      04/06/17 1245     Place sequential compression device  Until discontinued      04/06/17 1245        Karen Tan NP  Department of Hospital Medicine   [facility]

## 2017-04-07 ENCOUNTER — ANESTHESIA EVENT (OUTPATIENT)
Dept: CARDIOLOGY | Facility: HOSPITAL | Age: 57
DRG: 310 | End: 2017-04-07
Payer: COMMERCIAL

## 2017-04-07 ENCOUNTER — ANESTHESIA (OUTPATIENT)
Dept: CARDIOLOGY | Facility: HOSPITAL | Age: 57
DRG: 310 | End: 2017-04-07
Payer: COMMERCIAL

## 2017-04-07 VITALS
TEMPERATURE: 98 F | HEART RATE: 72 BPM | SYSTOLIC BLOOD PRESSURE: 161 MMHG | BODY MASS INDEX: 37.55 KG/M2 | RESPIRATION RATE: 18 BRPM | DIASTOLIC BLOOD PRESSURE: 78 MMHG | OXYGEN SATURATION: 98 % | WEIGHT: 253.5 LBS | HEIGHT: 69 IN

## 2017-04-07 PROBLEM — R79.89 ELEVATED TROPONIN: Status: RESOLVED | Noted: 2017-04-06 | Resolved: 2017-04-07

## 2017-04-07 PROBLEM — R74.8 ELEVATED LIVER ENZYMES: Status: RESOLVED | Noted: 2017-04-06 | Resolved: 2017-04-07

## 2017-04-07 PROBLEM — I35.0 AORTIC STENOSIS, SEVERE: Chronic | Status: ACTIVE | Noted: 2017-04-07

## 2017-04-07 PROBLEM — I35.1 AORTIC VALVE INSUFFICIENCY: Status: ACTIVE | Noted: 2017-04-07

## 2017-04-07 LAB
AORTIC VALVE STENOSIS: ABNORMAL
MITRAL VALVE REGURGITATION: ABNORMAL
RETIRED EF AND QEF - SEE NOTES: 55 (ref 55–65)

## 2017-04-07 PROCEDURE — 93325 DOPPLER ECHO COLOR FLOW MAPG: CPT | Mod: 26,,, | Performed by: INTERNAL MEDICINE

## 2017-04-07 PROCEDURE — 93312 ECHO TRANSESOPHAGEAL: CPT | Performed by: INTERNAL MEDICINE

## 2017-04-07 PROCEDURE — 93325 DOPPLER ECHO COLOR FLOW MAPG: CPT | Performed by: INTERNAL MEDICINE

## 2017-04-07 PROCEDURE — 25000003 PHARM REV CODE 250: Performed by: NURSE ANESTHETIST, CERTIFIED REGISTERED

## 2017-04-07 PROCEDURE — 37000009 HC ANESTHESIA EA ADD 15 MINS: Performed by: INTERNAL MEDICINE

## 2017-04-07 PROCEDURE — 93312 ECHO TRANSESOPHAGEAL: CPT | Mod: 26,,, | Performed by: INTERNAL MEDICINE

## 2017-04-07 PROCEDURE — 63600175 PHARM REV CODE 636 W HCPCS: Performed by: NURSE ANESTHETIST, CERTIFIED REGISTERED

## 2017-04-07 PROCEDURE — 93320 DOPPLER ECHO COMPLETE: CPT | Performed by: INTERNAL MEDICINE

## 2017-04-07 PROCEDURE — 25000003 PHARM REV CODE 250: Performed by: NURSE PRACTITIONER

## 2017-04-07 PROCEDURE — 99232 SBSQ HOSP IP/OBS MODERATE 35: CPT | Mod: ,,, | Performed by: INTERNAL MEDICINE

## 2017-04-07 PROCEDURE — 93320 DOPPLER ECHO COMPLETE: CPT | Mod: 26,,, | Performed by: INTERNAL MEDICINE

## 2017-04-07 PROCEDURE — 25000003 PHARM REV CODE 250: Performed by: PHYSICIAN ASSISTANT

## 2017-04-07 PROCEDURE — 37000008 HC ANESTHESIA 1ST 15 MINUTES: Performed by: INTERNAL MEDICINE

## 2017-04-07 RX ORDER — DILTIAZEM HYDROCHLORIDE 120 MG/1
120 CAPSULE, COATED, EXTENDED RELEASE ORAL EVERY 12 HOURS
Qty: 60 CAPSULE | Refills: 1 | Status: SHIPPED | OUTPATIENT
Start: 2017-04-07 | End: 2017-04-28

## 2017-04-07 RX ORDER — PROPOFOL 10 MG/ML
VIAL (ML) INTRAVENOUS
Status: DISCONTINUED | OUTPATIENT
Start: 2017-04-07 | End: 2017-04-07

## 2017-04-07 RX ORDER — ASPIRIN 81 MG/1
81 TABLET ORAL DAILY
Refills: 0 | COMMUNITY
Start: 2017-04-07 | End: 2020-03-16

## 2017-04-07 RX ORDER — LIDOCAINE HCL/PF 100 MG/5ML
SYRINGE (ML) INTRAVENOUS
Status: DISCONTINUED | OUTPATIENT
Start: 2017-04-07 | End: 2017-04-07

## 2017-04-07 RX ORDER — ASPIRIN 81 MG/1
81 TABLET ORAL DAILY
Status: DISCONTINUED | OUTPATIENT
Start: 2017-04-07 | End: 2017-04-07 | Stop reason: HOSPADM

## 2017-04-07 RX ADMIN — PROPOFOL 100 MG: 10 INJECTION, EMULSION INTRAVENOUS at 12:04

## 2017-04-07 RX ADMIN — LIDOCAINE HYDROCHLORIDE 80 MG: 20 INJECTION, SOLUTION INTRAVENOUS at 12:04

## 2017-04-07 RX ADMIN — ASPIRIN 81 MG: 81 TABLET, COATED ORAL at 09:04

## 2017-04-07 RX ADMIN — CHLORDIAZEPOXIDE HYDROCHLORIDE 10 MG: 10 CAPSULE ORAL at 01:04

## 2017-04-07 RX ADMIN — NIFEDIPINE 120 MG: 10 CAPSULE ORAL at 09:04

## 2017-04-07 RX ADMIN — PROPOFOL 60 MG: 10 INJECTION, EMULSION INTRAVENOUS at 12:04

## 2017-04-07 RX ADMIN — PROPOFOL 50 MG: 10 INJECTION, EMULSION INTRAVENOUS at 12:04

## 2017-04-07 RX ADMIN — CHLORDIAZEPOXIDE HYDROCHLORIDE 10 MG: 10 CAPSULE ORAL at 05:04

## 2017-04-07 NOTE — NURSING
Went over discharge instructions with patient.   Stressed importance of making and keeping all follow ups.   Patient verbalized understanding and has no questions in regards to discharge.  IV removed, catheter intact.  Telemetry box removed.  Patient wheeled down to waiting car by staff, no distress noted.

## 2017-04-07 NOTE — PLAN OF CARE
Problem: Patient Care Overview  Goal: Plan of Care Review  Outcome: Ongoing (interventions implemented as appropriate)  Pt alert and oriented, rested during shift. Banana bag @ 100 ml/hr, Librium administered - no s/s of ETOH withdrawal noted. Pt converted from AFIB to SR 50-60's. NPO after midnight per order. Stable on RA. Pt remained free of falls during shift, urinal at bedside, call light in reach, room free of clutter, side rails up X2, will continue to monitor.

## 2017-04-07 NOTE — ANESTHESIA RELEASE NOTE
"Anesthesia Release from PACU Note    Patient: Sukhjinder Kim    Procedure(s) Performed: Procedure(s) (LRB):  TRANSESOPHAGEAL ECHOCARDIOGRAM (LAST) (N/A)  CARDIOVERSION (N/A)    Anesthesia type: MAC    Post pain: Adequate analgesia    Post assessment: no apparent anesthetic complications    Last Vitals:   Visit Vitals    BP (!) 144/66 (BP Location: Left arm, Patient Position: Lying, BP Method: Automatic)    Pulse (!) 53    Temp 36.6 °C (97.9 °F) (Oral)    Resp 18    Ht 5' 9" (1.753 m)    Wt 115 kg (253 lb 8.5 oz)    SpO2 97%    BMI 37.44 kg/m2       Post vital signs: stable    Level of consciousness: awake    Nausea/Vomiting: no nausea/no vomiting    Complications: none    Airway Patency: patent    Respiratory: unassisted    Cardiovascular: stable and blood pressure at baseline    Hydration: euvolemic  "

## 2017-04-07 NOTE — ANESTHESIA PREPROCEDURE EVALUATION
04/07/2017  Sukhjinder Kim is a 56 y.o., male.    [facility] Anesthesia Evaluation    I have reviewed the Patient Summary Reports.    I have reviewed the Nursing Notes.   I have reviewed the Medications.     Review of Systems  Anesthesia Hx:  No problems with previous Anesthesia    Social:  Smoker, Alcohol Use    Hematology/Oncology:  Hematology Normal   Oncology Normal     EENT/Dental:EENT/Dental Normal   Cardiovascular:   Dysrhythmias atrial fibrillation    Pulmonary:  Pulmonary Normal    Renal/:  Renal/ Normal     Hepatic/GI:  Hepatic/GI Normal  Liver Disease, Abnormal Liver Enzymes    Musculoskeletal:  Musculoskeletal Normal    Neurological:  Neurology Normal    Endocrine:  Endocrine Normal  Metabolic Disorders, Obesity / BMI > 30  Dermatological:  Skin Normal    Psych:  Psychiatric Normal           Physical Exam  General:  Morbid Obesity    Airway/Jaw/Neck:  Airway Findings: Mouth Opening: Normal Tongue: Normal  General Airway Assessment: Adult       Chest/Lungs:  Chest/Lungs Findings: Clear to auscultation     Heart/Vascular:  Heart Findings: Rate: Normal             Anesthesia Plan  Type of Anesthesia, risks & benefits discussed:  Anesthesia Type:  MAC  Patient's Preference:   Intra-op Monitoring Plan:   Intra-op Monitoring Plan Comments:   Post Op Pain Control Plan:   Post Op Pain Control Plan Comments:   Induction:   IV  Beta Blocker:  Patient is not currently on a Beta-Blocker (No further documentation required).       Informed Consent: Patient understands risks and agrees with Anesthesia plan.  Questions answered. Anesthesia consent signed with patient.  ASA Score: 2     Day of Surgery Review of History & Physical: I have interviewed and examined the patient. I have reviewed the patient's H&P dated:  There are no significant changes.          Ready For Surgery From Anesthesia Perspective.

## 2017-04-07 NOTE — PROGRESS NOTES
Cardiology Progress Note        SUBJECTIVE:     History of Present Illness:  Patient is a 56 y.o. male who presents with new onset atrial fibrillation with RVR. Patient seen and examined today, resting in bed. No complaints. No acute events overnight. Converted to SR. Denies chest pain or SOB. Troponin trending down, 0.042, 0.038. 2D echo reviewed and showed normal EF, severe AS, pulmonary HTN. LAST planned for today to further evaluate aortic valve.       OBJECTIVE:     Vital Signs (Most Recent)  Temp: 97.9 °F (36.6 °C) (04/07/17 0721)  Pulse: (!) 58 (04/07/17 0721)  Resp: 18 (04/07/17 0721)  BP: (!) 144/66 (04/07/17 0721)  SpO2: 97 % (04/07/17 0721)    Vital Signs Range (Last 24H):  Temp:  [97.9 °F (36.6 °C)-98.3 °F (36.8 °C)]   Pulse:  []   Resp:  [14-18]   BP: (101-144)/(57-79)   SpO2:  [95 %-99 %]     Intake/Output last 3 shifts:  I/O last 3 completed shifts:  In: 1033.3 [P.O.:360; I.V.:673.3]  Out: 1625 [Urine:1625]    Intake/Output this shift:       Review of patient's allergies indicates:  No Known Allergies    Current Facility-Administered Medications   Medication    chlordiazepoxide capsule 10 mg    diltiaZEM 24 hr capsule 120 mg    enoxaparin injection 40 mg    sodium chloride 0.9% 1,000 mL with mvi, adult no.4 with vit K 3,300 unit- 150 mcg/10 mL 10 mL, thiamine 100 mg, folic acid 1 mg infusion     No current facility-administered medications on file prior to encounter.      No current outpatient prescriptions on file prior to encounter.       Physical Exam:  General appearance: alert, appears stated age and cooperative  Head: Normocephalic, without obvious abnormality, atraumatic  Neck: no adenopathy, no carotid bruit, no JVD  Lungs:  clear to auscultation bilaterally  Chest wall: no tenderness  Heart: regular rate and rhythm, S1, S2 normal, +systolic murmur ULSB  Abdomen: soft, non-tender  Extremities: extremities normal, atraumatic, no cyanosis or edema  Skin: Skin color, texture, turgor  normal. No rashes or lesions  Neurologic: Grossly normal, AAO x 3    Laboratory:  Chemistry:   Lab Results   Component Value Date     04/06/2017    K 4.1 04/06/2017    CL 96 04/06/2017    CO2 28 04/06/2017    BUN 10 04/06/2017    CREATININE 0.9 04/06/2017    CALCIUM 9.1 04/06/2017     Cardiac Markers:   Lab Results   Component Value Date    TROPONINI 0.042 (H) 04/06/2017     Cardiac Markers (Last 3):   Lab Results   Component Value Date    TROPONINI 0.042 (H) 04/06/2017    TROPONINI 0.038 (H) 04/06/2017    TROPONINI 0.043 (H) 04/06/2017     CBC:   Lab Results   Component Value Date    WBC 9.48 04/06/2017    HGB 14.1 04/06/2017    HCT 41.0 04/06/2017    MCV 92 04/06/2017     04/06/2017     Lipids: No results found for: CHOL, TRIG, HDL, LDLDIRECT  Coagulation:   Lab Results   Component Value Date    INR 1.1 04/06/2017       Diagnostic Results:  ECG: Reviewed  X-Ray: Reviewed  Echo: Reviewed      ASSESSMENT/PLAN:     Patient Active Problem List   Diagnosis    Persistent atrial fibrillation with RVR    Cocaine abuse    Tobacco use    Alcohol use    Elevated troponin    Elevated liver enzymes      Patient who presents with new onset atrial fibrillation with RVR, likely triggered by cocaine and ETOH use, has since converted. Remains in SR. 2D echo showed severe AS, LAST pending for further evaluation. Continue current meds.   Plan:   -Continue Cardizem   -Add ASA 81 mg daily  -NPO. LAST today by Dr. Cook. All risks, benefits, and treatment alternatives explained to patient in detail. All questions answered. He has agreed to proceed    Chart reviewed. Dr. Cook examined patient and agrees with plan as outlined above.

## 2017-04-07 NOTE — PROGRESS NOTES
Pt appears to have converted from AFIB to SR. NP aware- EKG confirmed NSR. Will continue to monitor.

## 2017-04-07 NOTE — PLAN OF CARE
04/07/17 1000   Discharge Assessment   Assessment Type Discharge Planning Assessment   Confirmed/corrected address and phone number on facesheet? Yes   Assessment information obtained from? Patient;Medical Record   Expected Length of Stay (days) (tbd)   Type of Healthcare Directive Received Other (Comment)  (information given)   Prior to hospitilization cognitive status: Alert/Oriented   Prior to hospitalization functional status: Independent   Current cognitive status: Alert/Oriented   Current Functional Status: Independent   Arrived From home or self-care   Lives With alone   Able to Return to Prior Arrangements yes   Is patient able to care for self after discharge? No;Yes   How many people do you have in your home that can help with your care after discharge? 0   Who are your caregiver(s) and their phone number(s)? Maria Ines Kim ( sister ) [phone]   Patient's perception of discharge disposition admitted as an inpatient   Readmission Within The Last 30 Days no previous admission in last 30 days   Patient currently being followed by outpatient case management? No   Patient currently receives home health services? No   Does the patient currently use HME? No   Patient currently receives private duty nursing? No   Patient currently receives any other outside agency services? No   Equipment Currently Used at Home none   Do you have any problems affording any of your prescribed medications? No   Is the patient taking medications as prescribed? yes   Do you have any financial concerns preventing you from receiving the healthcare you need? No   Does the patient have transportation to healthcare appointments? Yes   Transportation Available family or friend will provide;car   On Dialysis? No   Does the patient receive services at the Coumadin Clinic? No   Are there any open cases? No   Discharge Plan A Home   Discharge Plan B Home;Home with family   Patient/Family In Agreement With Plan yes

## 2017-04-07 NOTE — ANESTHESIA POSTPROCEDURE EVALUATION
"Anesthesia Post Evaluation    Patient: Sukhjinder Kim    Procedure(s) Performed: Procedure(s) (LRB):  TRANSESOPHAGEAL ECHOCARDIOGRAM (LAST) (N/A)  CARDIOVERSION (N/A)    Final Anesthesia Type: MAC  Patient location during evaluation: PACU  Patient participation: Yes- Able to Participate  Level of consciousness: awake and alert  Post-procedure vital signs: reviewed and stable  Pain management: adequate  Airway patency: patent  PONV status at discharge: No PONV  Anesthetic complications: no      Cardiovascular status: blood pressure returned to baseline  Respiratory status: unassisted  Hydration status: euvolemic  Follow-up not needed.        Visit Vitals    BP (!) 144/66 (BP Location: Left arm, Patient Position: Lying, BP Method: Automatic)    Pulse (!) 53    Temp 36.6 °C (97.9 °F) (Oral)    Resp 18    Ht 5' 9" (1.753 m)    Wt 115 kg (253 lb 8.5 oz)    SpO2 97%    BMI 37.44 kg/m2       Pain/Alesha Score: Pain Assessment Performed: Yes (4/7/2017 11:20 AM)  Presence of Pain: denies (4/7/2017 11:20 AM)  Pain Rating Post Med Admin: 0 (4/7/2017  9:56 AM)      "

## 2017-04-07 NOTE — NURSING TRANSFER
Nursing Transfer Note      4/7/2017     Transfer To: 208 from [facility]    Transfer via wheelchair    Transfer with RN    Transported by CHAD Garza rn    Medicines sent: none    Chart send with patient: Yes    Notified: SAGRARIO Clemente with bedside report    Patient reassessed at: 1245 4/7/17    on arrival to floor: cardiac monitor applied, patient oriented to room, call bell in reach and bed in lowest position

## 2017-04-07 NOTE — OP NOTE
Surgeon/Physician: Karson Cook MD   Pre Op Diagnosis: sclerotic aortic valve and Afib  Post OP Diagnosis: Severe AS with sclerosis.   Procedure Performed: Transesophageal echocardiogram   Procedure Description: The risks, benefits, and alternatives of the procedure expalined in detail with patient and family. The patient voices understanding and all questions have been addressed. The patient agrees to proceed as planned.   The patient was sedated by anesthesiology service. The probe was advanced via throat into esophagus smoothly. The patient tolerated the procedure well and there was no complications. The probed was withdrawal at the end of study. The patient was hemodynamically stable.   Estimated Blood Loss: none.   Findings / Operative Note:   Normal EF. Calcified aortic valve with severe AS. Tri-leaflet aortic valve.    Ok to transfer to floor once hemodynamically stable.  Continue cardizem 120 mg bid and ASA 81 mg daily  Will arrange cardiology f/u at [facility].

## 2017-04-07 NOTE — INTERVAL H&P NOTE
The patient has been examined and the H&P has been reviewed:    I concur with the findings and no changes have occurred since H&P was written.    Anesthesia/Surgery risks, benefits and alternative options discussed and understood by patient/family.          Active Hospital Problems    Diagnosis  POA    *Persistent atrial fibrillation with RVR [I48.1]  Yes    Cocaine abuse [F14.10]  Yes    Tobacco use [Z72.0]  Yes    Alcohol use [Z78.9]  Yes    Elevated troponin [R74.8]  Yes    Elevated liver enzymes [R74.8]  Yes      Resolved Hospital Problems    Diagnosis Date Resolved POA   No resolved problems to display.

## 2017-04-07 NOTE — H&P (VIEW-ONLY)
"Consult Note  Cardiology     Consult Requested By: Dr. Bond  Reason for Consult: New onset afib with RVR    SUBJECTIVE:     History of Present Illness:  Patient is a 56 y.o. male with a PMHx of tobacco and ETOH abuse (drinks 4-6 beers daily) who presented to [facility] ED today with a chief complaint of palpitations that started shortly PTA. Patient reportedly had a party at his home last night and used cocaine and drank. He went to bed, but awoke six hours later, and felt his heart "racing". Other associated symptoms included mild chest pressure and SOB, and feeling flushed. Patient denied any associated lightheadedness, dizziness, near syncope, or syncope. EKG performed in ED revealed new onset afib with RVR and patient was subsequently transferred to [facility] for further evaluation and treatment. Cardiology consulted to assist with management. Patient seen and examined today, resting in bed. Reports palpitations and SOB have improved. Denies chest pain. No significant cardiac history. He does report familial history of CAD and valvular heart disease. States his father had an MI and subsequent valve replacement in his 40's. Two brothers, required valve replacements as well, although one did not survive the surgery. Chart reviewed. BP markedly elevated upon arrival to ED (206/112). Initial troponin 0.038. UDS + for cocaine and THC. 2D echo pending.     Review of patient's allergies indicates:  No Known Allergies    Past Medical History:   Diagnosis Date    Tobacco abuse      Past Surgical History:   Procedure Laterality Date    APPENDECTOMY       Family History   Problem Relation Age of Onset    Valvular heart disease Father      66 yo    Heart attacks under age 50 Father 42    Valvular heart disease Brother 27     valve Replacement surgery    Valvular heart disease Brother 62     valvue Replacement     Social History   Substance Use Topics    Smoking status: Current Every Day Smoker     Packs/day: 1.50     " Years: 40.00     Types: Cigarettes    Smokeless tobacco: None    Alcohol use Yes      Comment: daily - 6 pack beer        Review of Systems:  Constitutional: negative  Eyes: negative  Ears, nose, mouth, throat, and face: negative  Respiratory: +SOB  Cardiovascular: +palpitations, mild chest pressure  Gastrointestinal: negative  Genitourinary:negative  Hematologic/lymphatic: negative  Musculoskeletal:negative,   Neurological: negative  Behavioral/Psych: negative  Endocrine: negative  Allergic/Immunologic: negative    OBJECTIVE:     Vital Signs (Most Recent)  Temp: 98.3 °F (36.8 °C) (04/06/17 1500)  Pulse: 76 (04/06/17 1500)  Resp: 18 (04/06/17 1500)  BP: (!) 143/79 (04/06/17 1500)  SpO2: 97 % (04/06/17 1500)    Vital Signs Range (Last 24H):  Temp:  [97.7 °F (36.5 °C)-98.3 °F (36.8 °C)]   Pulse:  []   Resp:  [14-29]   BP: (101-206)/()   SpO2:  [95 %-99 %]     Current Facility-Administered Medications   Medication    diltiaZEM 24 hr capsule 120 mg    diltiaZEM injection 10 mg    diltiaZEM injection 10 mg    enoxaparin injection 40 mg    [START ON 4/7/2017] sodium chloride 0.9% 1,000 mL with mvi, adult no.4 with vit K 3,300 unit- 150 mcg/10 mL 10 mL, thiamine 100 mg, folic acid 1 mg infusion     No current facility-administered medications on file prior to encounter.      Current Outpatient Prescriptions on File Prior to Encounter   Medication Sig    [DISCONTINUED] hydrocodone-acetaminophen 10-325mg (NORCO)  mg Tab Take 1 tablet by mouth every 4 (four) hours as needed for Pain.       Physical Exam:  General appearance: alert, appears stated age and cooperative  Head: Normocephalic, without obvious abnormality, atraumatic  Neck: no adenopathy, no carotid bruit, no JVD  Lungs:  clear to auscultation bilaterally  Chest wall: no tenderness  Heart: irregularly irregular rate and rhythm S1, S2 normal, +systolic murmur ULSB  Abdomen: soft, non-tender; bowel sounds normal; no masses,  no  organomegaly  Extremities: extremities normal, atraumatic, no cyanosis or edema  Skin: Skin color, texture, turgor normal. No rashes or lesions  Neurologic: Grossly normal, AAO x 3    Laboratory:  Chemistry:   Lab Results   Component Value Date     04/06/2017    K 4.1 04/06/2017    CL 96 04/06/2017    CO2 28 04/06/2017    BUN 10 04/06/2017    CREATININE 0.9 04/06/2017    CALCIUM 9.1 04/06/2017     Cardiac Markers:   Lab Results   Component Value Date    TROPONINI 0.038 (H) 04/06/2017     Cardiac Markers (Last 3):   Lab Results   Component Value Date    TROPONINI 0.038 (H) 04/06/2017    TROPONINI 0.043 (H) 04/06/2017     CBC:   Lab Results   Component Value Date    WBC 9.48 04/06/2017    HGB 14.1 04/06/2017    HCT 41.0 04/06/2017    MCV 92 04/06/2017     04/06/2017     Lipids: No results found for: CHOL, TRIG, HDL, LDLDIRECT  Coagulation:   Lab Results   Component Value Date    INR 1.1 04/06/2017       Diagnostic Results:  ECG: Reviewed  X-Ray: Reviewed  Echo: pending      ASSESSMENT/PLAN:     Patient Active Problem List   Diagnosis    Persistent atrial fibrillation with RVR    Cocaine abuse    Tobacco use    Alcohol use    Elevated troponin    Elevated liver enzymes      Patient who presents with new onset atrial fibrillation with RVR in setting of ETOH abuse and recent cocaine use. HR better controlled at time of exam, ok to switch to po cardizem. Add ASA 81 mg for now. Trend troponin. Check 2D echo. Patient counseled on drug and ETOH cessation.   Plan:   -Ok to d/c cardizem gtt; start cardizem 120 mg BID  -ASA 81 mg daily  -Trend troponin  -Check 2D echo  -Drug and ETOH cessation    Chart reviewed. Dr. Cook agrees with plan as outlined above.

## 2017-04-07 NOTE — TRANSFER OF CARE
"Anesthesia Transfer of Care Note    Patient: Sukhjinder Kim    Procedure(s) Performed: Procedure(s) (LRB):  TRANSESOPHAGEAL ECHOCARDIOGRAM (LAST) (N/A)  CARDIOVERSION (N/A)    Patient location: PACU    Anesthesia Type: MAC    Transport from OR: Transported from OR on room air with adequate spontaneous ventilation    Post pain: adequate analgesia    Post assessment: no apparent anesthetic complications    Post vital signs: stable    Level of consciousness: awake    Nausea/Vomiting: no nausea/vomiting    Complications: none          Last vitals:   Visit Vitals    BP (!) 144/66 (BP Location: Left arm, Patient Position: Lying, BP Method: Automatic)    Pulse (!) 53    Temp 36.6 °C (97.9 °F) (Oral)    Resp 18    Ht 5' 9" (1.753 m)    Wt 115 kg (253 lb 8.5 oz)    SpO2 97%    BMI 37.44 kg/m2     "

## 2017-04-17 NOTE — DISCHARGE SUMMARY
"[facility] Hospital Medicine  Discharge Summary      Patient Name: Sukhjinder Kim  MRN: [id number]  Admission Date: 4/6/2017  Hospital Length of Stay: 1 days  Discharge Date and Time: 4/7/2017  2:57 PM  Attending Physician: Jen att. providers found   Discharging Provider: Floyd Neely MD  Primary Care Provider: Primary Doctor Jen      HPI:   Sukhjinder Kim is a 57 yo male with PMHx of Tobacco and ETOH abuse, who presented to [facility] ER with palpitations. He used Cocaine last night, "first time in years" then 6 hours later, the palpitations woke him up from sleep. In ER, 206/112. ECG showed Atrial Fibrillation with RVR. Urine drug screen positive for Cocaine and Marijuana. He drinks 6 pack beer daily and 1.5 ppd cigarettes daily. He was placed on IV Cardizem which dropped his heart rate to the 80's. Associated s/s included chest pressure, SOB, flushed feeling. Cardiology has been consulted.     Procedure(s) (LRB):  TRANSESOPHAGEAL ECHOCARDIOGRAM (LAST) (N/A)  CARDIOVERSION (N/A)      Indwelling Lines/Drains at time of discharge:   Lines/Drains/Airways          No matching active lines, drains, or airways        Hospital Course:   Rate controlled with diltiazem and blood pressure returned to 140s systolic.  A suboptimal trans-thoracic echo showed severe aortic stenosis and a sclerotic aortic valve.  Cardiology elected to perform a Trans-esophageal echo for better characterization.  LAST confirmed severe aortic stenosis with calcification.  The patient also has a strong family history of valve disease.  Symptoms resolved with medical management.  Discharged home to follow up with Cardiology to plan valve replacement.  I have seen and examined the patient on the day of discharge and deemed him safe to return home.     Consults:   Consults         Status Ordering Provider     Inpatient consult to Cardiology  Once     Provider:  (Not yet assigned)    Completed GUILLERMINA ELISE          Significant " Diagnostic Studies: Labs: CMP No results for input(s): NA, K, CL, CO2, GLU, BUN, CREATININE, CALCIUM, PROT, ALBUMIN, BILITOT, ALKPHOS, AST, ALT, ANIONGAP, ESTGFRAFRICA, EGFRNONAA in the last 48 hours. and CBC No results for input(s): WBC, HGB, HCT, PLT in the last 48 hours.    Pending Diagnostic Studies:     None        Final Active Diagnoses:    Diagnosis Date Noted POA    PRINCIPAL PROBLEM:  Persistent atrial fibrillation with RVR [I48.1] 04/06/2017 Yes    Aortic stenosis, severe [I35.0] 04/07/2017 Yes     Chronic    Cocaine abuse [F14.10] 04/06/2017 Yes    Tobacco use [Z72.0] 04/06/2017 Yes    Alcohol use [Z78.9] 04/06/2017 Yes      Problems Resolved During this Admission:    Diagnosis Date Noted Date Resolved POA    Elevated troponin [R74.8] 04/06/2017 04/07/2017 Yes    Elevated liver enzymes [R74.8] 04/06/2017 04/07/2017 Yes      Elevated troponin, resolved as of 4/7/2017  -troponin q 6 hours      Elevated liver enzymes, resolved as of 4/7/2017  Monitor  -acute hepatitis panel        Discharged Condition: good    Disposition: Home or Self Care    Follow Up:  Follow-up Information     Follow up with Karosn Cook MD In 2 weeks.    Specialties:  Cardiology, Cardiovascular Disease    Why:  Hospital follow up.  Evaluation and planning for aortic valve repair.    Contact information:    [address] [facility] [address] [phone]          Patient Instructions:     Diet Cardiac     Activity as tolerated       Medications:  Reconciled Home Medications:   Discharge Medication List as of 4/7/2017  2:39 PM      START taking these medications    Details   aspirin (ECOTRIN) 81 MG EC tablet Take 1 tablet (81 mg total) by mouth once daily., Starting 4/7/2017, Until Sat 4/7/18, OTC      diltiaZEM (CARDIZEM CD) 120 MG Cp24 Take 1 capsule (120 mg total) by mouth every 12 (twelve) hours., Starting 4/7/2017, Until Sat 4/7/18, Normal           Time spent on the discharge of patient: 30 minutes    Floyd Neely,  MD  Department of Hospital Medicine  [facility]

## 2017-04-28 ENCOUNTER — OFFICE VISIT (OUTPATIENT)
Dept: CARDIOLOGY | Facility: CLINIC | Age: 57
End: 2017-04-28
Payer: COMMERCIAL

## 2017-04-28 VITALS
SYSTOLIC BLOOD PRESSURE: 206 MMHG | HEIGHT: 69 IN | DIASTOLIC BLOOD PRESSURE: 102 MMHG | HEART RATE: 64 BPM | WEIGHT: 245.56 LBS | BODY MASS INDEX: 36.37 KG/M2

## 2017-04-28 DIAGNOSIS — F14.10 COCAINE ABUSE: ICD-10-CM

## 2017-04-28 DIAGNOSIS — Z78.9 ALCOHOL USE: ICD-10-CM

## 2017-04-28 DIAGNOSIS — I10 ESSENTIAL HYPERTENSION: ICD-10-CM

## 2017-04-28 DIAGNOSIS — Z72.0 TOBACCO USE: ICD-10-CM

## 2017-04-28 DIAGNOSIS — I48.0 PAROXYSMAL ATRIAL FIBRILLATION: ICD-10-CM

## 2017-04-28 DIAGNOSIS — I35.0 AORTIC STENOSIS, SEVERE: Primary | Chronic | ICD-10-CM

## 2017-04-28 PROCEDURE — 3080F DIAST BP >= 90 MM HG: CPT | Mod: S$GLB,,, | Performed by: INTERNAL MEDICINE

## 2017-04-28 PROCEDURE — 3077F SYST BP >= 140 MM HG: CPT | Mod: S$GLB,,, | Performed by: INTERNAL MEDICINE

## 2017-04-28 PROCEDURE — 99214 OFFICE O/P EST MOD 30 MIN: CPT | Mod: S$GLB,,, | Performed by: INTERNAL MEDICINE

## 2017-04-28 PROCEDURE — 99999 PR PBB SHADOW E&M-EST. PATIENT-LVL III: CPT | Mod: PBBFAC,,, | Performed by: INTERNAL MEDICINE

## 2017-04-28 PROCEDURE — 1160F RVW MEDS BY RX/DR IN RCRD: CPT | Mod: S$GLB,,, | Performed by: INTERNAL MEDICINE

## 2017-04-28 RX ORDER — DILTIAZEM HYDROCHLORIDE 180 MG/1
180 CAPSULE, COATED, EXTENDED RELEASE ORAL 2 TIMES DAILY
Qty: 60 CAPSULE | Refills: 11 | Status: SHIPPED | OUTPATIENT
Start: 2017-04-28 | End: 2017-05-15

## 2017-04-28 NOTE — PATIENT INSTRUCTIONS
Cardiac Catheterization  You may have had angina, dizziness, or other symptoms of heart trouble. To help diagnose your problem, your doctor may suggest having a cardiac catheterization. This common procedure is sometimes also used to treat a heart problem.     The catheter may be placed in the arm or the groin.   Before the procedure  · Tell your doctor what medicines you take and about any allergies you have.  · Dont eat or drink anything after midnight, the night before the procedure.  · You may be admitted to the hospital on the day of the procedure.  · Know that any hair on the skin where the catheter will be inserted may be removed. You may be given medication to relax before the procedure.  During the procedure  · You will receive a local anesthetic to prevent pain at the insertion site.  · The doctor inserts an introducing sheath into a blood vessel in your groin or arm.  · Through the sheath, a long, thin tube called a catheter is placed inside the artery and guided toward your heart.  · To perform different tests or check other parts of the heart, the doctor inserts a new catheter or moves the catheter or X-ray machine.  · For some tests, a contrast dye is injected through the catheter.  After the procedure  · Your doctor or nurse will tell you how long to lie down and keep the insertion site still.  · If the insertion site was in your groin, you may need to lie down with your leg still for several hours.  · A nurse will check your blood pressure and the insertion site.  · You may be asked to drink fluid to help flush the contrast liquid out of your system.  · Have someone drive you home from the hospital.  · Its normal to find a small bruise or lump at the insertion site. These common side effects should disappear within a few weeks.  When to call your healthcare provider  Call your healthcare provider right away if you have any of the following:  · Angina (chest pain).  · Pain, swelling, redness,  bleeding, or drainage at the insertion site.  · Severe pain, coldness, or a bluish color in the leg or arm that held the catheter.  · Blood in your urine, black or tarry stools, or any other kind of bleeding.  · Fever of 100.4°F (38°C) or higher, or as directed by your healthcare provider   Date Last Reviewed: 2/26/2014  © 6434-6952 [employer]. [address] [place] [address], [place] [id number]. All rights reserved. This information is not intended as a substitute for professional medical care. Always follow your healthcare professional's instructions.

## 2017-04-28 NOTE — MR AVS SNAPSHOT
"    [facility]  [address] [address] [address]  Phone: [phone]                  Sukhjinder Kim   2017 11:30 AM   Office Visit    Description:  Male : 1960   Provider:  Karson Cook MD   Department:  [facility]           Reason for Visit     Atrial Fibrillation     Chest Pain                To Do List           Goals (5 Years of Data)     None      Follow-Up and Disposition     Return in about 3 months (around 2017).      [facility] On Call     [facility] On Call Nurse Care Line -  Assistance  Unless otherwise directed by your provider, please contact [facility], our nurse care line that is available for  assistance.     Registered nurses in the [facility] provide: appointment scheduling, clinical advisement, health education, and other advisory services.  Call: [phone] (toll free)               Medications           Message regarding Medications     Verify the changes and/or additions to your medication regime listed below are the same as discussed with your clinician today.  If any of these changes or additions are incorrect, please notify your healthcare provider.             Verify that the below list of medications is an accurate representation of the medications you are currently taking.  If none reported, the list may be blank. If incorrect, please contact your healthcare provider. Carry this list with you in case of emergency.           Current Medications     aspirin (ECOTRIN) 81 MG EC tablet Take 1 tablet (81 mg total) by mouth once daily.    diltiaZEM (CARDIZEM CD) 120 MG Cp24 Take 1 capsule (120 mg total) by mouth every 12 (twelve) hours.           Clinical Reference Information           Your Vitals Were     BP Pulse Height Weight BMI    206/102 (BP Location: Left arm) 64 5' 9" (1.753 m) 111.4 kg (245 lb 9.5 oz) 36.27 kg/m2      Blood Pressure          Most Recent Value    BP  (!)  206/102      Allergies as of 2017     No " Known Allergies      Immunizations Administered on Date of Encounter - 4/28/2017     None      [facility] Sign-Up     Activating your [facility] account is as easy as 1-2-3!     1) Visit [email or web address], select Sign Up Now, enter this activation code and your date of birth, then select Next.  [email or web address]  Expires: 5/22/2017  2:08 PM      2) Create a username and password to use when you visit [facility] in the future and select a security question in case you lose your password and select Next.    3) Enter your e-mail address and click Sign Up!    Additional Information  If you have questions, please e-mail [email or web address] or call [phone] to talk to our [facility] staff. Remember, [facility] is NOT to be used for urgent needs. For medical emergencies, dial 911.         Instructions      Cardiac Catheterization  You may have had angina, dizziness, or other symptoms of heart trouble. To help diagnose your problem, your doctor may suggest having a cardiac catheterization. This common procedure is sometimes also used to treat a heart problem.     The catheter may be placed in the arm or the groin.   Before the procedure  · Tell your doctor what medicines you take and about any allergies you have.  · Dont eat or drink anything after midnight, the night before the procedure.  · You may be admitted to the hospital on the day of the procedure.  · Know that any hair on the skin where the catheter will be inserted may be removed. You may be given medication to relax before the procedure.  During the procedure  · You will receive a local anesthetic to prevent pain at the insertion site.  · The doctor inserts an introducing sheath into a blood vessel in your groin or arm.  · Through the sheath, a long, thin tube called a catheter is placed inside the artery and guided toward your heart.  · To perform different tests or check other parts of the heart, the doctor inserts a new catheter or moves the catheter or X-ray  machine.  · For some tests, a contrast dye is injected through the catheter.  After the procedure  · Your doctor or nurse will tell you how long to lie down and keep the insertion site still.  · If the insertion site was in your groin, you may need to lie down with your leg still for several hours.  · A nurse will check your blood pressure and the insertion site.  · You may be asked to drink fluid to help flush the contrast liquid out of your system.  · Have someone drive you home from the hospital.  · Its normal to find a small bruise or lump at the insertion site. These common side effects should disappear within a few weeks.  When to call your healthcare provider  Call your healthcare provider right away if you have any of the following:  · Angina (chest pain).  · Pain, swelling, redness, bleeding, or drainage at the insertion site.  · Severe pain, coldness, or a bluish color in the leg or arm that held the catheter.  · Blood in your urine, black or tarry stools, or any other kind of bleeding.  · Fever of 100.4°F (38°C) or higher, or as directed by your healthcare provider   Date Last Reviewed: 2/26/2014  © 5104-7122 [employer]. [address] [place] [address]. All rights reserved. This information is not intended as a substitute for professional medical care. Always follow your healthcare professional's instructions.             Smoking Cessation     If you would like to quit smoking:   You may be eligible for free services if you are a [place] resident and started smoking cigarettes before September 1, 1988.  Call the Smoking Cessation Trust ([facility]) toll free at (822) 079-8306 or (053) 155-5016.   Call 1-800-QUIT-NOW if you do not meet the above criteria.   Contact us via email: [email or web address]   View our website for more information: [email or web address]        Language Assistance Services     ATTENTION: Language assistance services are available, free of charge.  Please call [phone].      ATENCIÓN: Si habla español, tiene a mcintyre disposición servicios gratuitos de asistencia lingüística. Llame al [phone].     CHÚ Ý: N?u b?n nói Ti?ng Vi?t, có các d?ch v? h? tr? ngôn ng? mi?n phí dành cho b?n. G?i s? [phone].         [facility] complies with applicable Federal civil rights laws and does not discriminate on the basis of race, color, national origin, age, disability, or sex.

## 2017-04-28 NOTE — PROGRESS NOTES
Subjective:   Patient ID:  Sukhjinder Kim is a 56 y.o. male who presents for follow up of Atrial Fibrillation and Chest Pain      HPI Comments: 57 yo, male, came in for severe AS.  PMH coccaine abuser, alcohol and tobacco abuser and strong f/h premature valvular Dz, (father and young brother had valvular replacement).  On 04-, was admitted to [facility] symptomatic AFIB with RVR after smoking coccaine on the party. After Cardizme gtt, the rhythm was converted to sinus o/n. ECHo showed normal EF and marked calcified aortic valve. 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 Mild exertion. Had 3 episodes of palpitation, from minutes to 15 minutes. With mild dizziness. No chest pain, dyspnea, fainting.  Smokes down to 3 cigs. No drinking/drugs after discharge.  Did not take cardizem in AM.      Past Medical History:   Diagnosis Date    Tobacco abuse        Past Surgical History:   Procedure Laterality Date    APPENDECTOMY         Social History   Substance Use Topics    Smoking status: Current Every Day Smoker     Packs/day: 0.20     Years: 40.00     Types: Cigarettes    Smokeless tobacco: None    Alcohol use Yes      Comment: daily - 6 pack beer       Family History   Problem Relation Age of Onset    Valvular heart disease Father      66 yo    Heart attacks under age 50 Father 42    Valvular heart disease Brother 27     valve Replacement surgery    Valvular heart disease Brother 62     valvue Replacement         Review of Systems   Constitution: Negative for decreased appetite, diaphoresis, fever, weakness, malaise/fatigue and night sweats.   HENT: Negative for headaches and nosebleeds.    Eyes: Negative for blurred vision and double vision.   Cardiovascular: Negative for chest pain, claudication, dyspnea on exertion, irregular heartbeat, leg swelling, near-syncope, orthopnea, palpitations, paroxysmal nocturnal dyspnea and syncope.   Respiratory:  Negative for cough, shortness of breath, sleep disturbances due to breathing, snoring, sputum production and wheezing.    Endocrine: Negative for cold intolerance and polyuria.   Hematologic/Lymphatic: Does not bruise/bleed easily.   Skin: Negative for rash.   Musculoskeletal: Negative for back pain, falls, joint pain, joint swelling and neck pain.   Gastrointestinal: Negative for abdominal pain, heartburn, nausea and vomiting.   Genitourinary: Negative for dysuria, frequency and hematuria.   Neurological: Positive for dizziness. Negative for difficulty with concentration, focal weakness, light-headedness, numbness and seizures.   Psychiatric/Behavioral: Negative for depression, memory loss and substance abuse. The patient does not have insomnia.    Allergic/Immunologic: Negative for HIV exposure and hives.       Objective:   Physical Exam   Constitutional: He is oriented to person, place, and time. He appears well-nourished.   HENT:   Head: Normocephalic.   Eyes: Pupils are equal, round, and reactive to light.   Neck: Normal carotid pulses and no JVD present. Carotid bruit is not present. No thyromegaly present.   Cardiovascular: Normal rate, regular rhythm and normal pulses.   No extrasystoles are present. PMI is not displaced.  Exam reveals no gallop and no S3.    Murmur heard.  Pulses:       Radial pulses are 2+ on the right side, and 2+ on the left side.   ESM on RUSb and midLSB, S2 not audible   Pulmonary/Chest: Breath sounds normal. No stridor. No respiratory distress.   Abdominal: Soft. Bowel sounds are normal. There is no tenderness. There is no rebound.   Musculoskeletal: Normal range of motion.   Neurological: He is alert and oriented to person, place, and time.   Skin: Skin is intact. No rash noted.   Psychiatric: His behavior is normal.       No results found for: CHOL  No results found for: HDL  No results found for: LDLCALC  No results found for: TRIG  No results found for: CHOLHDL    Chemistry         Component Value Date/Time     04/06/2017 0500    K 4.1 04/06/2017 0500    CL 96 04/06/2017 0500    CO2 28 04/06/2017 0500    BUN 10 04/06/2017 0500    CREATININE 0.9 04/06/2017 0500     (H) 04/06/2017 0500        Component Value Date/Time    CALCIUM 9.1 04/06/2017 0500    ALKPHOS 101 04/06/2017 1915    AST 73 (H) 04/06/2017 1915    ALT 76 (H) 04/06/2017 1915    BILITOT 0.8 04/06/2017 1915          No results found for: TSH  Lab Results   Component Value Date    INR 1.1 04/06/2017     Lab Results   Component Value Date    WBC 9.48 04/06/2017    HGB 14.1 04/06/2017    HCT 41.0 04/06/2017    MCV 92 04/06/2017     04/06/2017     BMP  Sodium   Date Value Ref Range Status   04/06/2017 137 136 - 145 mmol/L Final     Potassium   Date Value Ref Range Status   04/06/2017 4.1 3.5 - 5.1 mmol/L Final     Chloride   Date Value Ref Range Status   04/06/2017 96 95 - 110 mmol/L Final     CO2   Date Value Ref Range Status   04/06/2017 28 23 - 29 mmol/L Final     BUN, Bld   Date Value Ref Range Status   04/06/2017 10 6 - 20 mg/dL Final     Creatinine   Date Value Ref Range Status   04/06/2017 0.9 0.5 - 1.4 mg/dL Final     Calcium   Date Value Ref Range Status   04/06/2017 9.1 8.7 - 10.5 mg/dL Final     Anion Gap   Date Value Ref Range Status   04/06/2017 13 8 - 16 mmol/L Final     eGFR if    Date Value Ref Range Status   04/06/2017 >60.0 >60 mL/min/1.73 m^2 Final     eGFR if non    Date Value Ref Range Status   04/06/2017 >60.0 >60 mL/min/1.73 m^2 Final     Comment:     Calculation used to obtain the estimated glomerular filtration  rate (eGFR) is the CKD-EPI equation. Since race is unknown   in our information system, the eGFR values for   -American and Non--American patients are given   for each creatinine result.       CrCl cannot be calculated (Patient has no serum creatinine result on file.).     Assessment:      1. Aortic stenosis, severe    2. Paroxysmal atrial  fibrillation    3. Alcohol use    4. Cocaine abuse    5. Tobacco use    6. Essential hypertension        Plan:   Increase cardizem to 180 mg bid and continue ASA 81mg   Arrange LHC/RHC on next Thursday.  RTC in 3 months

## 2017-05-02 ENCOUNTER — TELEPHONE (OUTPATIENT)
Dept: CARDIOLOGY | Facility: CLINIC | Age: 57
End: 2017-05-02

## 2017-05-02 DIAGNOSIS — I48.0 PAF (PAROXYSMAL ATRIAL FIBRILLATION): ICD-10-CM

## 2017-05-02 DIAGNOSIS — I35.0 AORTIC VALVE STENOSIS, UNSPECIFIED ETIOLOGY: Primary | ICD-10-CM

## 2017-05-03 ENCOUNTER — LAB VISIT (OUTPATIENT)
Dept: LAB | Facility: HOSPITAL | Age: 57
End: 2017-05-03
Attending: INTERNAL MEDICINE
Payer: COMMERCIAL

## 2017-05-03 DIAGNOSIS — I35.0 AORTIC VALVE STENOSIS, UNSPECIFIED ETIOLOGY: ICD-10-CM

## 2017-05-03 DIAGNOSIS — I48.0 PAF (PAROXYSMAL ATRIAL FIBRILLATION): ICD-10-CM

## 2017-05-03 LAB
ANION GAP SERPL CALC-SCNC: 11 MMOL/L
APTT BLDCRRT: 29.6 SEC
BASOPHILS # BLD AUTO: 0.09 K/UL
BASOPHILS NFR BLD: 1.3 %
BUN SERPL-MCNC: 14 MG/DL
CALCIUM SERPL-MCNC: 9.4 MG/DL
CHLORIDE SERPL-SCNC: 99 MMOL/L
CO2 SERPL-SCNC: 28 MMOL/L
CREAT SERPL-MCNC: 0.8 MG/DL
DIFFERENTIAL METHOD: ABNORMAL
EOSINOPHIL # BLD AUTO: 0.2 K/UL
EOSINOPHIL NFR BLD: 3.1 %
ERYTHROCYTE [DISTWIDTH] IN BLOOD BY AUTOMATED COUNT: 11.8 %
EST. GFR  (AFRICAN AMERICAN): >60 ML/MIN/1.73 M^2
EST. GFR  (NON AFRICAN AMERICAN): >60 ML/MIN/1.73 M^2
GLUCOSE SERPL-MCNC: 125 MG/DL
HCT VFR BLD AUTO: 38.5 %
HGB BLD-MCNC: 13.3 G/DL
INR PPP: 1.1
LYMPHOCYTES # BLD AUTO: 2.2 K/UL
LYMPHOCYTES NFR BLD: 30.7 %
MCH RBC QN AUTO: 31.7 PG
MCHC RBC AUTO-ENTMCNC: 34.5 %
MCV RBC AUTO: 92 FL
MONOCYTES # BLD AUTO: 1 K/UL
MONOCYTES NFR BLD: 13.4 %
NEUTROPHILS # BLD AUTO: 3.7 K/UL
NEUTROPHILS NFR BLD: 51.2 %
PLATELET # BLD AUTO: 208 K/UL
PMV BLD AUTO: 9 FL
POTASSIUM SERPL-SCNC: 4.5 MMOL/L
PROTHROMBIN TIME: 11.3 SEC
RBC # BLD AUTO: 4.2 M/UL
SODIUM SERPL-SCNC: 138 MMOL/L
WBC # BLD AUTO: 7.17 K/UL

## 2017-05-03 PROCEDURE — 36415 COLL VENOUS BLD VENIPUNCTURE: CPT | Mod: PO

## 2017-05-03 PROCEDURE — 80048 BASIC METABOLIC PNL TOTAL CA: CPT | Mod: PO

## 2017-05-03 PROCEDURE — 85025 COMPLETE CBC W/AUTO DIFF WBC: CPT | Mod: PO

## 2017-05-03 PROCEDURE — 85730 THROMBOPLASTIN TIME PARTIAL: CPT | Mod: PO

## 2017-05-03 PROCEDURE — 85610 PROTHROMBIN TIME: CPT | Mod: PO

## 2017-05-04 ENCOUNTER — TELEPHONE (OUTPATIENT)
Dept: CARDIOLOGY | Facility: CLINIC | Age: 57
End: 2017-05-04

## 2017-05-04 ENCOUNTER — SURGERY (OUTPATIENT)
Age: 57
End: 2017-05-04

## 2017-05-04 ENCOUNTER — HOSPITAL ENCOUNTER (OUTPATIENT)
Facility: HOSPITAL | Age: 57
Discharge: HOME | End: 2017-05-04
Attending: INTERNAL MEDICINE | Admitting: INTERNAL MEDICINE
Payer: COMMERCIAL

## 2017-05-04 VITALS
HEART RATE: 67 BPM | BODY MASS INDEX: 36.29 KG/M2 | RESPIRATION RATE: 21 BRPM | WEIGHT: 245 LBS | SYSTOLIC BLOOD PRESSURE: 154 MMHG | HEIGHT: 69 IN | TEMPERATURE: 99 F | OXYGEN SATURATION: 98 % | DIASTOLIC BLOOD PRESSURE: 88 MMHG

## 2017-05-04 DIAGNOSIS — I35.0 AORTIC STENOSIS: ICD-10-CM

## 2017-05-04 DIAGNOSIS — R06.02 SHORTNESS OF BREATH: ICD-10-CM

## 2017-05-04 DIAGNOSIS — I35.0 AORTIC VALVE STENOSIS, UNSPECIFIED ETIOLOGY: ICD-10-CM

## 2017-05-04 PROCEDURE — 93456 R HRT CORONARY ARTERY ANGIO: CPT | Mod: 26,,, | Performed by: INTERNAL MEDICINE

## 2017-05-04 PROCEDURE — 63600175 PHARM REV CODE 636 W HCPCS

## 2017-05-04 PROCEDURE — 25000003 PHARM REV CODE 250

## 2017-05-04 PROCEDURE — 99152 MOD SED SAME PHYS/QHP 5/>YRS: CPT | Mod: ,,, | Performed by: INTERNAL MEDICINE

## 2017-05-04 PROCEDURE — C1769 GUIDE WIRE: HCPCS

## 2017-05-04 RX ORDER — SODIUM CHLORIDE 9 MG/ML
INJECTION, SOLUTION INTRAVENOUS CONTINUOUS
Status: CANCELLED | OUTPATIENT
Start: 2017-05-04

## 2017-05-04 RX ORDER — DIPHENHYDRAMINE HCL 50 MG
50 CAPSULE ORAL ONCE
Status: COMPLETED | OUTPATIENT
Start: 2017-05-04 | End: 2017-05-04

## 2017-05-04 RX ORDER — SODIUM CHLORIDE 9 MG/ML
INJECTION, SOLUTION INTRAVENOUS CONTINUOUS
Status: DISCONTINUED | OUTPATIENT
Start: 2017-05-04 | End: 2017-05-04 | Stop reason: HOSPADM

## 2017-05-04 RX ORDER — DIAZEPAM 5 MG/1
5 TABLET ORAL
Status: COMPLETED | OUTPATIENT
Start: 2017-05-04 | End: 2017-05-04

## 2017-05-04 RX ORDER — ASPIRIN 325 MG
325 TABLET ORAL ONCE
Status: COMPLETED | OUTPATIENT
Start: 2017-05-04 | End: 2017-05-04

## 2017-05-04 RX ADMIN — DIAZEPAM 5 MG: 5 TABLET ORAL at 08:05

## 2017-05-04 RX ADMIN — SODIUM CHLORIDE: 9 INJECTION, SOLUTION INTRAVENOUS at 08:05

## 2017-05-04 RX ADMIN — Medication 325 MG: at 08:05

## 2017-05-04 RX ADMIN — Medication 50 MG: at 08:05

## 2017-05-04 NOTE — PLAN OF CARE
1330 VSS DIET TAKEN 100% DISCHARGE INSTRUCTION GIVEN TO PT AND BROTHER AMB IN UNIT RT RAD DRESSING DRY AND INTACT WITH IMMOBILIZER CM AND PIV DC'D REDRESSED 1345 DISCHARGED PER W/C WITH BELONGINGS ACCOMPANIED BY THIS RN AND BROTHER

## 2017-05-04 NOTE — H&P (VIEW-ONLY)
Subjective:   Patient ID:  Sukhjinder Kim is a 56 y.o. male who presents for follow up of Atrial Fibrillation and Chest Pain      HPI Comments: 57 yo, male, came in for severe AS.  PMH coccaine abuser, alcohol and tobacco abuser and strong f/h premature valvular Dz, (father and young brother had valvular replacement).  On 04-, was admitted to [facility] symptomatic AFIB with RVR after smoking coccaine on the party. After Cardizme gtt, the rhythm was converted to sinus o/n. ECHo showed normal EF and marked calcified aortic valve. 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 Mild exertion. Had 3 episodes of palpitation, from minutes to 15 minutes. With mild dizziness. No chest pain, dyspnea, fainting.  Smokes down to 3 cigs. No drinking/drugs after discharge.  Did not take cardizem in AM.      Past Medical History:   Diagnosis Date    Tobacco abuse        Past Surgical History:   Procedure Laterality Date    APPENDECTOMY         Social History   Substance Use Topics    Smoking status: Current Every Day Smoker     Packs/day: 0.20     Years: 40.00     Types: Cigarettes    Smokeless tobacco: None    Alcohol use Yes      Comment: daily - 6 pack beer       Family History   Problem Relation Age of Onset    Valvular heart disease Father      64 yo    Heart attacks under age 50 Father 42    Valvular heart disease Brother 27     valve Replacement surgery    Valvular heart disease Brother 62     valvue Replacement         Review of Systems   Constitution: Negative for decreased appetite, diaphoresis, fever, weakness, malaise/fatigue and night sweats.   HENT: Negative for headaches and nosebleeds.    Eyes: Negative for blurred vision and double vision.   Cardiovascular: Negative for chest pain, claudication, dyspnea on exertion, irregular heartbeat, leg swelling, near-syncope, orthopnea, palpitations, paroxysmal nocturnal dyspnea and syncope.   Respiratory:  Negative for cough, shortness of breath, sleep disturbances due to breathing, snoring, sputum production and wheezing.    Endocrine: Negative for cold intolerance and polyuria.   Hematologic/Lymphatic: Does not bruise/bleed easily.   Skin: Negative for rash.   Musculoskeletal: Negative for back pain, falls, joint pain, joint swelling and neck pain.   Gastrointestinal: Negative for abdominal pain, heartburn, nausea and vomiting.   Genitourinary: Negative for dysuria, frequency and hematuria.   Neurological: Positive for dizziness. Negative for difficulty with concentration, focal weakness, light-headedness, numbness and seizures.   Psychiatric/Behavioral: Negative for depression, memory loss and substance abuse. The patient does not have insomnia.    Allergic/Immunologic: Negative for HIV exposure and hives.       Objective:   Physical Exam   Constitutional: He is oriented to person, place, and time. He appears well-nourished.   HENT:   Head: Normocephalic.   Eyes: Pupils are equal, round, and reactive to light.   Neck: Normal carotid pulses and no JVD present. Carotid bruit is not present. No thyromegaly present.   Cardiovascular: Normal rate, regular rhythm and normal pulses.   No extrasystoles are present. PMI is not displaced.  Exam reveals no gallop and no S3.    Murmur heard.  Pulses:       Radial pulses are 2+ on the right side, and 2+ on the left side.   ESM on RUSb and midLSB, S2 not audible   Pulmonary/Chest: Breath sounds normal. No stridor. No respiratory distress.   Abdominal: Soft. Bowel sounds are normal. There is no tenderness. There is no rebound.   Musculoskeletal: Normal range of motion.   Neurological: He is alert and oriented to person, place, and time.   Skin: Skin is intact. No rash noted.   Psychiatric: His behavior is normal.       No results found for: CHOL  No results found for: HDL  No results found for: LDLCALC  No results found for: TRIG  No results found for: CHOLHDL    Chemistry         Component Value Date/Time     04/06/2017 0500    K 4.1 04/06/2017 0500    CL 96 04/06/2017 0500    CO2 28 04/06/2017 0500    BUN 10 04/06/2017 0500    CREATININE 0.9 04/06/2017 0500     (H) 04/06/2017 0500        Component Value Date/Time    CALCIUM 9.1 04/06/2017 0500    ALKPHOS 101 04/06/2017 1915    AST 73 (H) 04/06/2017 1915    ALT 76 (H) 04/06/2017 1915    BILITOT 0.8 04/06/2017 1915          No results found for: TSH  Lab Results   Component Value Date    INR 1.1 04/06/2017     Lab Results   Component Value Date    WBC 9.48 04/06/2017    HGB 14.1 04/06/2017    HCT 41.0 04/06/2017    MCV 92 04/06/2017     04/06/2017     BMP  Sodium   Date Value Ref Range Status   04/06/2017 137 136 - 145 mmol/L Final     Potassium   Date Value Ref Range Status   04/06/2017 4.1 3.5 - 5.1 mmol/L Final     Chloride   Date Value Ref Range Status   04/06/2017 96 95 - 110 mmol/L Final     CO2   Date Value Ref Range Status   04/06/2017 28 23 - 29 mmol/L Final     BUN, Bld   Date Value Ref Range Status   04/06/2017 10 6 - 20 mg/dL Final     Creatinine   Date Value Ref Range Status   04/06/2017 0.9 0.5 - 1.4 mg/dL Final     Calcium   Date Value Ref Range Status   04/06/2017 9.1 8.7 - 10.5 mg/dL Final     Anion Gap   Date Value Ref Range Status   04/06/2017 13 8 - 16 mmol/L Final     eGFR if    Date Value Ref Range Status   04/06/2017 >60.0 >60 mL/min/1.73 m^2 Final     eGFR if non    Date Value Ref Range Status   04/06/2017 >60.0 >60 mL/min/1.73 m^2 Final     Comment:     Calculation used to obtain the estimated glomerular filtration  rate (eGFR) is the CKD-EPI equation. Since race is unknown   in our information system, the eGFR values for   -American and Non--American patients are given   for each creatinine result.       CrCl cannot be calculated (Patient has no serum creatinine result on file.).     Assessment:      1. Aortic stenosis, severe    2. Paroxysmal atrial  fibrillation    3. Alcohol use    4. Cocaine abuse    5. Tobacco use    6. Essential hypertension        Plan:   Increase cardizem to 180 mg bid and continue ASA 81mg   Arrange LHC/RHC on next Thursday.  RTC in 3 months

## 2017-05-04 NOTE — INTERVAL H&P NOTE
The patient has been examined and the H&P has been reviewed:    I concur with the findings and no changes have occurred since H&P was written.    Anesthesia/Surgery risks, benefits and alternative options discussed and understood by patient/family.          Active Hospital Problems    Diagnosis  POA    Aortic stenosis [I35.0]  Yes     Priority: High      Resolved Hospital Problems    Diagnosis Date Resolved POA   No resolved problems to display.

## 2017-05-04 NOTE — IP AVS SNAPSHOT
[facility]  [address] [place] [address] [address] [place] [address]           Patient Discharge Instructions   Our goal is to set you up for success. This packet includes information on your condition, medications, and your home care.  It will help you care for yourself to prevent having to return to the hospital.     Please ask your nurse if you have any questions.      There are many details to remember when preparing to leave the hospital. Here is what you will need to do:    1. Take your medicine. If you are prescribed medications, review your Medication List on the following pages. You may have new medications to  at the pharmacy and others that you'll need to stop taking. Review the instructions for how and when to take your medications. Talk with your doctor or nurses if you are unsure of what to do.     2. Go to your follow-up appointments. Specific follow-up information is listed in the following pages. Your may be contacted by a nurse or clinical provider about future appointments. Be sure we have all of the phone numbers to reach you. Please contact your provider's office if you are unable to make an appointment.     3. Watch for warning signs. Your doctor or nurse will give you detailed warning signs to watch for and when to call for assistance. These instructions may also include educational information about your condition. If you experience any of warning signs to your health, call your doctor.           [facility]  Unless otherwise directed by your provider, please   contact [facility], our nurse care line   that is available for 24/7 assistance.     [phone] (toll-free)     Registered nurses in the [facility]   provide: appointment scheduling, clinical advisement, health education, and other advisory services.                  ** Verify the list of medication(s) below is accurate and up to date. Carry this with you in case of emergency. If your  medications have changed, please notify your healthcare provider.             Medication List      CONTINUE taking these medications        Additional Info                      aspirin 81 MG EC tablet   Commonly known as:  ECOTRIN   Refills:  0   Dose:  81 mg    Instructions:  Take 1 tablet (81 mg total) by mouth once daily.     Begin Date    AM    Noon    PM    Bedtime       diltiaZEM 180 MG 24 hr capsule   Commonly known as:  CARDIZEM CD   Quantity:  60 capsule   Refills:  11   Dose:  180 mg    Instructions:  Take 1 capsule (180 mg total) by mouth 2 (two) times daily.     Begin Date    AM    Noon    PM    Bedtime                  Please bring to all follow up appointments:    1. A copy of your discharge instructions.  2. All medicines you are currently taking in their original bottles.  3. Identification and insurance card.    Please arrive 15 minutes ahead of scheduled appointment time.    Please call 24 hours in advance if you must reschedule your appointment and/or time.        Your Scheduled Appointments     May 15, 2017  1:00 PM CDT   Established Patient Visit with Karson Cook MD   [facility]    [id number] [place] [id number] [phone]              Follow-up Information     Follow up with Karson Cook MD In 1 week.    Specialties:  Cardiology, Cardiovascular Disease    Contact information:    [address] [facility] [address] [phone]          Discharge Instructions     Future Orders    Call MD for:  difficulty breathing, headache or visual disturbances     Call MD for:  hives     Call MD for:  persistent nausea and vomiting     Call MD for:  redness, tenderness, or signs of infection (pain, swelling, redness, odor or green/yellow discharge around incision site)     Call MD for:  severe uncontrolled pain     Call MD for:  temperature >100.4     Diet general     Questions:    Total calories:      Fat restriction, if any:      Protein restriction, if  "any:      Na restriction, if any:      Fluid restriction:      Additional restrictions:          Discharge Instructions       Post-op Heart Catheterization    1. DIET: It is advisable for you to follow a diet that limits the intake of salt, sugar, saturated fats and cholesterol.     2. DRIVING: Due to sedation you received during your procedure, DO NOT drive or operate machinery for 24 hours. Avoid making critical decisions or signing legal documents until tomorrow.    3. ACTIVITY: AVOID activities that require bending of the affected arm/wrist for 3 days and submerging the site in water for 3 days. REMOVE the dressing the day after  the procedure, you may apply a bandaid to the site if you desire. You may RESUME       your normal activities or prescribed exercise program as instructed by your physician after 3 days.                                                                                                                 4. WOUND CARE: It is not unusual to have a small amount of bruising to appear at or near the puncture site. It is also common to have a tender "knot" develop beneath the skin at the puncture site of the wrist/arm. This is usually scar tissue and is not a cause for concern or alarm. This tender knot may take several weeks to fully resolve. The bruise will usually spread over several days. However, if the lump gets bigger, call your doctor immediately.    5. DISCOMFORT: For general discomfort at the puncture site, you may take 1 or 2 Acetaminophen (Tylenol) tablets every 4 - 6 hours as needed. (Do not take more than 4000 mg a day)    6. SIGNS AND SYMPTOMS TO REPORT:  Call your physician immediately if any of the following occur:                                            1. Loss of feeling, warmth or color to the affected arm/wrist                                                                                                          2. Mild beeding from the site                 3. Pain that is " "sudden, sharp or persistent in the affected arm/wrist                 4. Swelling or a change in "lump" size, increased redness or drainage at                               the puncture site                                                                               5. High fever (101 degrees or higher)    7. GO TO  THE EMERGENCY ROOM OR CALL 911 IF YOU HAVE: Chest pains or discomforts not relieved with 3 nitroglycerin doses (sublingual tablets or spray), numbness or severe pain of limb, if your limb becomes cold or discolored or if you develop uncontrolled bleeding from the puncture site (quickly apply firm, direct pressure above the site).        Admission Information     Date & Time Provider Department CSN    5/4/2017  7:47 AM Adriano Traore MD [facility] -  [id number]      Care Providers     Provider Role Specialty Primary office phone    Adriano Traore MD Attending Provider Cardiology [phone]    Adriano Traore MD Surgeon  Cardiology [phone]      Your Vitals Were     BP Pulse Temp Resp Height Weight    162/69 57 98.8 °F (37.1 °C) (Oral) 15 5' 9" (1.753 m) 111.1 kg (245 lb)    SpO2 BMI             98% 36.18 kg/m2         Recent Lab Values     No lab values to display.      Allergies as of 5/4/2017     No Known Allergies      Advance Directives     An advance directive is a document which, in the event you are no longer able to make decisions for yourself, tells your healthcare team what kind of treatment you do or do not want to receive, or who you would like to make those decisions for you.  If you do not currently have an advance directive, [facility] encourages you to create one.  For more information call:  [phone]), [phone] ([phone]),  or log on to [email or web address].        Smoking Cessation     If you would like to quit smoking:   You may be eligible for free services if you are a [place] resident and started smoking cigarettes before September " 1, 1988.  Call the Smoking Cessation Trust (SCT) toll free at (220) 434-1465 or (417) 429-3318.   Call 1-800-QUIT-NOW if you do not meet the above criteria.   Contact us via email: [email or web address]   View our website for more information: [email or web address]        Language Assistance Services     ATTENTION: Language assistance services are available, free of charge. Please call [phone].      ATENCIÓN: Si habla nori, tiene a mcintyre disposición servicios gratuitos de asistencia lingüística. Llame al [phone].     [facility] Ý: N?u b?n nói Ti?ng Vi?t, có các d?ch v? h? tr? ngôn ng? mi?n phí dành cho b?n. G?i s? [phone].        [facility] Sign-Up     Activating your [facility] account is as easy as 1-2-3!     1) Visit [email or web address], select Sign Up Now, enter this activation code and your date of birth, then select Next.  [email or web address]  Expires: 5/22/2017  2:08 PM      2) Create a username and password to use when you visit [facility] in the future and select a security question in case you lose your password and select Next.    3) Enter your e-mail address and click Sign Up!    Additional Information  If you have questions, please e-mail [email or web address] or call [phone] to talk to our [facility] staff. Remember, [facility] is NOT to be used for urgent needs. For medical emergencies, dial 911.          [facility] complies with applicable Federal civil rights laws and does not discriminate on the basis of race, color, national origin, age, disability, or sex.

## 2017-05-15 ENCOUNTER — OFFICE VISIT (OUTPATIENT)
Dept: CARDIOLOGY | Facility: CLINIC | Age: 57
End: 2017-05-15
Payer: COMMERCIAL

## 2017-05-15 VITALS
HEART RATE: 72 BPM | WEIGHT: 245.56 LBS | SYSTOLIC BLOOD PRESSURE: 162 MMHG | BODY MASS INDEX: 36.37 KG/M2 | DIASTOLIC BLOOD PRESSURE: 90 MMHG | HEIGHT: 69 IN

## 2017-05-15 DIAGNOSIS — I35.0 AORTIC STENOSIS, SEVERE: Primary | Chronic | ICD-10-CM

## 2017-05-15 DIAGNOSIS — Z72.0 TOBACCO USE: ICD-10-CM

## 2017-05-15 DIAGNOSIS — I10 ESSENTIAL HYPERTENSION: ICD-10-CM

## 2017-05-15 DIAGNOSIS — F14.10 COCAINE ABUSE: ICD-10-CM

## 2017-05-15 DIAGNOSIS — I48.0 PAROXYSMAL ATRIAL FIBRILLATION: ICD-10-CM

## 2017-05-15 PROCEDURE — 1160F RVW MEDS BY RX/DR IN RCRD: CPT | Mod: S$GLB,,, | Performed by: INTERNAL MEDICINE

## 2017-05-15 PROCEDURE — 3080F DIAST BP >= 90 MM HG: CPT | Mod: S$GLB,,, | Performed by: INTERNAL MEDICINE

## 2017-05-15 PROCEDURE — 3077F SYST BP >= 140 MM HG: CPT | Mod: S$GLB,,, | Performed by: INTERNAL MEDICINE

## 2017-05-15 PROCEDURE — 99999 PR PBB SHADOW E&M-EST. PATIENT-LVL III: CPT | Mod: PBBFAC,,, | Performed by: INTERNAL MEDICINE

## 2017-05-15 PROCEDURE — 99214 OFFICE O/P EST MOD 30 MIN: CPT | Mod: S$GLB,,, | Performed by: INTERNAL MEDICINE

## 2017-05-15 RX ORDER — DILTIAZEM HYDROCHLORIDE 240 MG/1
240 CAPSULE, COATED, EXTENDED RELEASE ORAL DAILY
Qty: 30 CAPSULE | Refills: 11 | Status: ON HOLD | OUTPATIENT
Start: 2017-05-15 | End: 2017-06-06 | Stop reason: HOSPADM

## 2017-05-15 NOTE — PROGRESS NOTES
Subjective:   Patient ID:  Sukhjinder Kim is a 56 y.o. male who presents for follow up of Hypertension      HPI Comments: 55 yo, male, came in for severe AS.  PMH severe AS with bicuspid aortic valves, normal EF, PAF (short period after using cocaine), coccaine abuser, alcohol and tobacco abuser and strong f/h premature valvular Dz, (father and young brother had valvular replacement).  On 04-, was admitted to [facility] symptomatic AFIB with RVR after smoking coccaine on the party. After Cardizme gtt, the rhythm was converted to sinus o/n. ECHo showed normal EF and marked calcified aortic valve. Discharged with cardizem and ASA   04/2017, LAST confirmed severe AS and trileaflets.  05/05/17, had cath and normal coronary artery.  Today, states that only works few hours a week to keep insurance. Mild exertion. Had 3 episodes of palpitation, from minutes to 15 minutes. With mild dizziness. No chest pain, dyspnea, fainting.  Smokes down to 3 cigs. No drinking/drugs after discharge.            Past Medical History:   Diagnosis Date    Tobacco abuse        Past Surgical History:   Procedure Laterality Date    APPENDECTOMY         Social History   Substance Use Topics    Smoking status: Current Every Day Smoker     Packs/day: 0.20     Years: 40.00     Types: Cigarettes    Smokeless tobacco: None    Alcohol use Yes      Comment: daily - 6 pack beer       Family History   Problem Relation Age of Onset    Valvular heart disease Father      64 yo    Heart attacks under age 50 Father 42    Valvular heart disease Brother 27     valve Replacement surgery    Valvular heart disease Brother 62     valvue Replacement         Review of Systems   Constitution: Negative for decreased appetite, diaphoresis, fever, weakness, malaise/fatigue and night sweats.   HENT: Negative for headaches and nosebleeds.    Eyes: Negative for blurred vision and double vision.   Cardiovascular: Negative for chest pain, claudication, dyspnea on  exertion, irregular heartbeat, leg swelling, near-syncope, orthopnea, palpitations, paroxysmal nocturnal dyspnea and syncope.   Respiratory: Negative for cough, shortness of breath, sleep disturbances due to breathing, snoring, sputum production and wheezing.    Endocrine: Negative for cold intolerance and polyuria.   Hematologic/Lymphatic: Does not bruise/bleed easily.   Skin: Negative for rash.   Musculoskeletal: Negative for back pain, falls, joint pain, joint swelling and neck pain.   Gastrointestinal: Negative for abdominal pain, heartburn, nausea and vomiting.   Genitourinary: Negative for dysuria, frequency and hematuria.   Neurological: Positive for dizziness. Negative for difficulty with concentration, focal weakness, light-headedness, numbness and seizures.   Psychiatric/Behavioral: Negative for depression, memory loss and substance abuse. The patient does not have insomnia.    Allergic/Immunologic: Negative for HIV exposure and hives.       Objective:   Physical Exam   Constitutional: He is oriented to person, place, and time. He appears well-nourished.   HENT:   Head: Normocephalic.   Eyes: Pupils are equal, round, and reactive to light.   Neck: Normal carotid pulses and no JVD present. Carotid bruit is not present. No thyromegaly present.   Cardiovascular: Normal rate, regular rhythm and normal pulses.   No extrasystoles are present. PMI is not displaced.  Exam reveals no gallop and no S3.    Murmur heard.  Pulses:       Radial pulses are 2+ on the right side, and 2+ on the left side.   ESM on RUSb and midLSB, S2 not audible   Pulmonary/Chest: Breath sounds normal. No stridor. No respiratory distress.   Abdominal: Soft. Bowel sounds are normal. There is no tenderness. There is no rebound.   Musculoskeletal: Normal range of motion.   Neurological: He is alert and oriented to person, place, and time.   Skin: Skin is intact. No rash noted.   Psychiatric: His behavior is normal.       No results found for:  CHOL  No results found for: HDL  No results found for: LDLCALC  No results found for: TRIG  No results found for: CHOLHDL    Chemistry        Component Value Date/Time     05/03/2017 0814    K 4.5 05/03/2017 0814    CL 99 05/03/2017 0814    CO2 28 05/03/2017 0814    BUN 14 05/03/2017 0814    CREATININE 0.8 05/03/2017 0814     (H) 05/03/2017 0814        Component Value Date/Time    CALCIUM 9.4 05/03/2017 0814    ALKPHOS 101 04/06/2017 1915    AST 73 (H) 04/06/2017 1915    ALT 76 (H) 04/06/2017 1915    BILITOT 0.8 04/06/2017 1915          No results found for: TSH  Lab Results   Component Value Date    INR 1.1 05/03/2017    INR 1.1 04/06/2017     Lab Results   Component Value Date    WBC 7.17 05/03/2017    HGB 13.3 (L) 05/03/2017    HCT 38.5 (L) 05/03/2017    MCV 92 05/03/2017     05/03/2017     BMP  Sodium   Date Value Ref Range Status   05/03/2017 138 136 - 145 mmol/L Final     Potassium   Date Value Ref Range Status   05/03/2017 4.5 3.5 - 5.1 mmol/L Final     Chloride   Date Value Ref Range Status   05/03/2017 99 95 - 110 mmol/L Final     CO2   Date Value Ref Range Status   05/03/2017 28 23 - 29 mmol/L Final     BUN, Bld   Date Value Ref Range Status   05/03/2017 14 6 - 20 mg/dL Final     Creatinine   Date Value Ref Range Status   05/03/2017 0.8 0.5 - 1.4 mg/dL Final     Calcium   Date Value Ref Range Status   05/03/2017 9.4 8.7 - 10.5 mg/dL Final     Anion Gap   Date Value Ref Range Status   05/03/2017 11 8 - 16 mmol/L Final     eGFR if    Date Value Ref Range Status   05/03/2017 >60.0 >60 mL/min/1.73 m^2 Final     eGFR if non    Date Value Ref Range Status   05/03/2017 >60.0 >60 mL/min/1.73 m^2 Final     Comment:     Calculation used to obtain the estimated glomerular filtration  rate (eGFR) is the CKD-EPI equation. Since race is unknown   in our information system, the eGFR values for   -American and Non--American patients are given   for each  creatinine result.       Estimated Creatinine Clearance: 125.4 mL/min (based on Cr of 0.8).     Assessment:      1. Aortic stenosis, severe    2. Paroxysmal atrial fibrillation    3. Cocaine abuse    4. Tobacco use    5. Essential hypertension        Plan:   Refer to CVT   Increase cardizem to 240 mg daily and ASA  Recommend heart-healthy diet, weight control and regular exercise.  [facility]. Risk modification.   RTC in 3 months    I have reviewed all pertinent labs and cardiac studies. Plans and recommendations have been formulated under my direct supervision. All questions answered and patient voiced understanding. Patient to continue current medications.

## 2017-05-15 NOTE — MR AVS SNAPSHOT
[facility]  [address] [facility] [address]  Phone: [phone]  Fax: [phone]                  Sukhjinder Kim   5/15/2017 1:00 PM   Office Visit    Description:  Male : 1960   Provider:  Karson Cook MD   Department:  [facility]           Reason for Visit     Hypertension           Diagnoses this Visit        Comments    Aortic stenosis, severe    -  Primary     Paroxysmal atrial fibrillation         Cocaine abuse         Tobacco use         Essential hypertension                To Do List           Goals (5 Years of Data)     None      Follow-Up and Disposition     Return in about 3 months (around 8/15/2017).       These Medications        Disp Refills Start End    diltiaZEM (CARDIZEM CD) 240 MG 24 hr capsule 30 capsule 11 5/15/2017 5/15/2018    Take 1 capsule (240 mg total) by mouth once daily. - Oral    Pharmacy: [facility] [address] [address] [facility] #: [phone]         [facility]     [facility] Care Line -  Assistance  Unless otherwise directed by your provider, please contact [facility], our nurse care line that is available for  assistance.     Registered nurses in the [facility] Center provide: appointment scheduling, clinical advisement, health education, and other advisory services.  Call: [phone] (toll free)               Medications           Message regarding Medications     Verify the changes and/or additions to your medication regime listed below are the same as discussed with your clinician today.  If any of these changes or additions are incorrect, please notify your healthcare provider.        START taking these NEW medications        Refills    diltiaZEM (CARDIZEM CD) 240 MG 24 hr capsule 11    Sig: Take 1 capsule (240 mg total) by mouth once daily.    Class: Normal    Route: Oral           Verify that the below list of medications is an accurate representation of the  "medications you are currently taking.  If none reported, the list may be blank. If incorrect, please contact your healthcare provider. Carry this list with you in case of emergency.           Current Medications     aspirin (ECOTRIN) 81 MG EC tablet Take 1 tablet (81 mg total) by mouth once daily.    diltiaZEM (CARDIZEM CD) 240 MG 24 hr capsule Take 1 capsule (240 mg total) by mouth once daily.           Clinical Reference Information           Your Vitals Were     BP Pulse Height Weight BMI    162/90 (BP Location: Right arm) 72 5' 9" (1.753 m) 111.4 kg (245 lb 9.5 oz) 36.27 kg/m2      Blood Pressure          Most Recent Value    BP  (!)  162/90      Allergies as of 5/15/2017     No Known Allergies      Immunizations Administered on Date of Encounter - 5/15/2017     None      Orders Placed During Today's Visit      Normal Orders This Visit    Ambulatory consult to Cardiothoracic Surgery       [facility] Sign-Up     Activating your [facility] account is as easy as 1-2-3!     1) Visit [email or web address], select Sign Up Now, enter this activation code and your date of birth, then select Next.  [email or web address]  Expires: 5/22/2017  2:08 PM      2) Create a username and password to use when you visit [facility] in the future and select a security question in case you lose your password and select Next.    3) Enter your e-mail address and click Sign Up!    Additional Information  If you have questions, please e-mail [email or web address] or call [phone] to talk to our [facility] staff. Remember, [facility] is NOT to be used for urgent needs. For medical emergencies, dial 911.         Smoking Cessation     If you would like to quit smoking:   You may be eligible for free services if you are a [place] resident and started smoking cigarettes before September 1, 1988.  Call the Smoking Cessation Trust (SCT) toll free at (292) 840-4790 or (926) 023-0002.   Call 8-558-QUIT-NOW if you do not meet the above criteria.   Contact " us via email: [email or web address]   View our website for more information: [email or web address]        Language Assistance Services     ATTENTION: Language assistance services are available, free of charge. Please call [phone].      ATENCIÓN: Si habla nori, tiene a mcintyre disposición servicios gratuitos de asistencia lingüística. Llame al [phone].     CHÚ Ý: N?u b?n nói Ti?ng Vi?t, có các d?ch v? h? tr? ngôn ng? mi?n phí dành cho b?n. G?i s? [phone].         [facility] complies with applicable Federal civil rights laws and does not discriminate on the basis of race, color, national origin, age, disability, or sex.

## 2017-05-23 ENCOUNTER — TELEPHONE (OUTPATIENT)
Dept: CARDIOLOGY | Facility: CLINIC | Age: 57
End: 2017-05-23

## 2017-05-23 NOTE — TELEPHONE ENCOUNTER
----- Message from Dia Raymond sent at 5/19/2017  9:05 AM CDT -----  Contact: pt  Pt is calling to find out if there is any progress in the scheduling of his surgery.  He is very anxious to hear back today please.  He can be reached at [phone].

## 2017-05-23 NOTE — TELEPHONE ENCOUNTER
Attempted to return pts call. Surgery will be with [facility] not [facility]. No answer, no voicemail available

## 2017-05-25 DIAGNOSIS — I35.0 AORTIC VALVE STENOSIS, UNSPECIFIED ETIOLOGY: Primary | ICD-10-CM

## 2017-05-25 NOTE — DISCHARGE INSTRUCTIONS
To confirm, Your doctor has instructed you that surgery is scheduled for 05/31/17  at  07:00 a.m.       Please report to [facility], 1st floor, main lobby by 05:30 a.m  .      INSTRUCTIONS IMPORTANT!!!   Do not eat, drink, or smoke after 12 midnight-including water. OK to brush teeth, no gum, candy or mints!    ¨ Take only these medicines with a small swallow of water-morning of surgery.  N/A    Come to [facility] lab 5/30/17 (Tuesday morning) for Type and Screen    DO NOT REMOVE RED BLOOD ARMBAND PRIOR TO SURGERY    Pre operative instructions:  Please review the Pre-Operative Instruction booklet that you were given.        Bathing Instructions--See page 6 in the Pre-operative booklet.      Prevention of surgical site infections:     -Keep incisions clean and dry.   -Do not soak/submerge incisions in water until completely healed.   -Do not apply lotions, powders, creams, or deodorants to site.   -Always make sure hands are cleaned with antibacterial soap/ alcohol-based                 prior to touching the surgical site.  (This includes doctors,                 nurses, staff, and yourself.)    Signs and symptoms:   -Redness and pain around the area where you had surgery   -Drainage of cloudy fluid from your surgical wound   -Fever over 100.4       I have read or had read and explained to me, and understand the above information.  Additional comments or instructions:  Received a copy of Pre-operative instructions booklet, FAQ surgical site infection sheet, and packets of hibiclens (if indicated).

## 2017-05-26 ENCOUNTER — HOSPITAL ENCOUNTER (OUTPATIENT)
Dept: PULMONOLOGY | Facility: HOSPITAL | Age: 57
Discharge: HOME | End: 2017-05-26
Attending: THORACIC SURGERY (CARDIOTHORACIC VASCULAR SURGERY)
Payer: COMMERCIAL

## 2017-05-26 ENCOUNTER — HOSPITAL ENCOUNTER (OUTPATIENT)
Dept: RADIOLOGY | Facility: HOSPITAL | Age: 57
Discharge: HOME | End: 2017-05-26
Attending: THORACIC SURGERY (CARDIOTHORACIC VASCULAR SURGERY)
Payer: COMMERCIAL

## 2017-05-26 ENCOUNTER — HOSPITAL ENCOUNTER (OUTPATIENT)
Dept: PREADMISSION TESTING | Facility: HOSPITAL | Age: 57
Discharge: HOME | End: 2017-05-26
Attending: THORACIC SURGERY (CARDIOTHORACIC VASCULAR SURGERY)
Payer: COMMERCIAL

## 2017-05-26 VITALS
BODY MASS INDEX: 36.29 KG/M2 | RESPIRATION RATE: 20 BRPM | TEMPERATURE: 99 F | SYSTOLIC BLOOD PRESSURE: 166 MMHG | HEIGHT: 69 IN | DIASTOLIC BLOOD PRESSURE: 74 MMHG | OXYGEN SATURATION: 98 % | HEART RATE: 64 BPM | WEIGHT: 245 LBS

## 2017-05-26 DIAGNOSIS — I35.0 AORTIC VALVE STENOSIS, UNSPECIFIED ETIOLOGY: ICD-10-CM

## 2017-05-26 LAB
ALLENS TEST: ABNORMAL
DELSYS: ABNORMAL
FIO2: 21
HCO3 UR-SCNC: 28.7 MMOL/L (ref 24–28)
MODE: ABNORMAL
PCO2 BLDA: 45.6 MMHG (ref 35–45)
PH SMN: 7.41 [PH] (ref 7.35–7.45)
PO2 BLDA: 75 MMHG (ref 80–100)
POC BE: 4 MMOL/L
POC SATURATED O2: 95 % (ref 95–100)
SAMPLE: ABNORMAL
SITE: ABNORMAL

## 2017-05-26 PROCEDURE — 99900035 HC TECH TIME PER 15 MIN (STAT)

## 2017-05-26 PROCEDURE — 93010 ELECTROCARDIOGRAM REPORT: CPT | Mod: ,,, | Performed by: INTERNAL MEDICINE

## 2017-05-26 PROCEDURE — 82803 BLOOD GASES ANY COMBINATION: CPT

## 2017-05-26 PROCEDURE — 87081 CULTURE SCREEN ONLY: CPT

## 2017-05-26 PROCEDURE — 93005 ELECTROCARDIOGRAM TRACING: CPT

## 2017-05-26 PROCEDURE — 71020 XR CHEST PA AND LATERAL PRE-OP: CPT | Mod: TC

## 2017-05-26 PROCEDURE — 36600 WITHDRAWAL OF ARTERIAL BLOOD: CPT

## 2017-05-26 NOTE — PRE ADMISSION SCREENING
MRSA culture to lab    Pt refusing to go to ICU for pre op teaching.  States he has brothers who have had heart surgery and he knows what to expect.  Di in ICU called and informed pt will not be coming for pre op teaching.

## 2017-05-28 LAB — MRSA SPEC QL CULT: NORMAL

## 2017-05-31 ENCOUNTER — HOSPITAL ENCOUNTER (INPATIENT)
Facility: HOSPITAL | Age: 57
LOS: 6 days | Discharge: HOME | DRG: 220 | End: 2017-06-06
Attending: THORACIC SURGERY (CARDIOTHORACIC VASCULAR SURGERY) | Admitting: THORACIC SURGERY (CARDIOTHORACIC VASCULAR SURGERY)
Payer: COMMERCIAL

## 2017-05-31 DIAGNOSIS — Z78.9 ALCOHOL USE: ICD-10-CM

## 2017-05-31 DIAGNOSIS — I10 ESSENTIAL HYPERTENSION: ICD-10-CM

## 2017-05-31 DIAGNOSIS — Z99.11 ON MECHANICALLY ASSISTED VENTILATION: ICD-10-CM

## 2017-05-31 DIAGNOSIS — Z95.2 S/P AVR (AORTIC VALVE REPLACEMENT): ICD-10-CM

## 2017-05-31 DIAGNOSIS — I48.91 A-FIB: ICD-10-CM

## 2017-05-31 DIAGNOSIS — I35.0 AORTIC STENOSIS, SEVERE: Chronic | ICD-10-CM

## 2017-05-31 DIAGNOSIS — D72.829 LEUKOCYTOSIS, UNSPECIFIED TYPE: ICD-10-CM

## 2017-05-31 DIAGNOSIS — Z72.0 TOBACCO USE: ICD-10-CM

## 2017-05-31 DIAGNOSIS — D62 ACUTE BLOOD LOSS ANEMIA: ICD-10-CM

## 2017-05-31 DIAGNOSIS — I48.91 ATRIAL FIBRILLATION WITH RVR: ICD-10-CM

## 2017-05-31 DIAGNOSIS — I10 UNCONTROLLED HYPERTENSION: ICD-10-CM

## 2017-05-31 DIAGNOSIS — F14.10 COCAINE ABUSE: ICD-10-CM

## 2017-05-31 DIAGNOSIS — R73.9 ACUTE HYPERGLYCEMIA: ICD-10-CM

## 2017-05-31 DIAGNOSIS — I35.9 AORTIC VALVE DISEASE: ICD-10-CM

## 2017-05-31 LAB
ALLENS TEST: ABNORMAL
ALLENS TEST: ABNORMAL
ANION GAP SERPL CALC-SCNC: 6 MMOL/L
ANION GAP SERPL CALC-SCNC: 7 MMOL/L
APTT BLDCRRT: 30.6 SEC
APTT BLDCRRT: 44 SEC
BASOPHILS # BLD AUTO: 0.02 K/UL
BASOPHILS # BLD AUTO: 0.03 K/UL
BASOPHILS NFR BLD: 0.2 %
BASOPHILS NFR BLD: 0.2 %
BLD PROD TYP BPU: NORMAL
BLOOD UNIT EXPIRATION DATE: NORMAL
BLOOD UNIT TYPE CODE: 5100
BLOOD UNIT TYPE CODE: 600
BLOOD UNIT TYPE CODE: 6200
BLOOD UNIT TYPE CODE: 9500
BLOOD UNIT TYPE CODE: 9500
BLOOD UNIT TYPE: NORMAL
BUN SERPL-MCNC: 11 MG/DL
BUN SERPL-MCNC: 13 MG/DL
CALCIUM SERPL-MCNC: 7.2 MG/DL
CALCIUM SERPL-MCNC: 8.1 MG/DL
CHLORIDE SERPL-SCNC: 107 MMOL/L
CHLORIDE SERPL-SCNC: 108 MMOL/L
CK SERPL-CCNC: 52 U/L
CO2 SERPL-SCNC: 26 MMOL/L
CO2 SERPL-SCNC: 26 MMOL/L
CODING SYSTEM: NORMAL
CREAT SERPL-MCNC: 0.7 MG/DL
CREAT SERPL-MCNC: 0.9 MG/DL
DELSYS: ABNORMAL
DELSYS: ABNORMAL
DIFFERENTIAL METHOD: ABNORMAL
DIFFERENTIAL METHOD: ABNORMAL
DISPENSE STATUS: NORMAL
EOSINOPHIL # BLD AUTO: 0 K/UL
EOSINOPHIL # BLD AUTO: 0.2 K/UL
EOSINOPHIL NFR BLD: 0 %
EOSINOPHIL NFR BLD: 0.8 %
ERYTHROCYTE [DISTWIDTH] IN BLOOD BY AUTOMATED COUNT: 12.5 %
ERYTHROCYTE [DISTWIDTH] IN BLOOD BY AUTOMATED COUNT: 13.1 %
ERYTHROCYTE [SEDIMENTATION RATE] IN BLOOD BY WESTERGREN METHOD: 16 MM/H
ERYTHROCYTE [SEDIMENTATION RATE] IN BLOOD BY WESTERGREN METHOD: 16 MM/H
EST. GFR  (AFRICAN AMERICAN): >60 ML/MIN/1.73 M^2
EST. GFR  (AFRICAN AMERICAN): >60 ML/MIN/1.73 M^2
EST. GFR  (NON AFRICAN AMERICAN): >60 ML/MIN/1.73 M^2
EST. GFR  (NON AFRICAN AMERICAN): >60 ML/MIN/1.73 M^2
FIO2: 50
FIO2: 50
FLOW: 60
FLOW: 60
GLUCOSE SERPL-MCNC: 106 MG/DL
GLUCOSE SERPL-MCNC: 109 MG/DL (ref 70–110)
GLUCOSE SERPL-MCNC: 119 MG/DL (ref 70–110)
GLUCOSE SERPL-MCNC: 125 MG/DL
GLUCOSE SERPL-MCNC: 138 MG/DL (ref 70–110)
GLUCOSE SERPL-MCNC: 142 MG/DL (ref 70–110)
GLUCOSE SERPL-MCNC: 176 MG/DL (ref 70–110)
HCO3 UR-SCNC: 26.1 MMOL/L (ref 24–28)
HCO3 UR-SCNC: 26.6 MMOL/L (ref 24–28)
HCO3 UR-SCNC: 27.1 MMOL/L (ref 24–28)
HCO3 UR-SCNC: 27.2 MMOL/L (ref 24–28)
HCO3 UR-SCNC: 30.9 MMOL/L (ref 24–28)
HCO3 UR-SCNC: 31.2 MMOL/L (ref 24–28)
HCO3 UR-SCNC: 31.5 MMOL/L (ref 24–28)
HCT VFR BLD AUTO: 22.8 %
HCT VFR BLD AUTO: 24.7 %
HCT VFR BLD AUTO: 33.9 %
HCT VFR BLD CALC: 22 %PCV (ref 36–54)
HCT VFR BLD CALC: 23 %PCV (ref 36–54)
HCT VFR BLD CALC: 25 %PCV (ref 36–54)
HCT VFR BLD CALC: 32 %PCV (ref 36–54)
HCT VFR BLD CALC: 35 %PCV (ref 36–54)
HGB BLD-MCNC: 11.9 G/DL
HGB BLD-MCNC: 8.2 G/DL
HGB BLD-MCNC: 8.9 G/DL
INR PPP: 1.2
INR PPP: 1.4
LYMPHOCYTES # BLD AUTO: 0.8 K/UL
LYMPHOCYTES # BLD AUTO: 2.4 K/UL
LYMPHOCYTES NFR BLD: 12.8 %
LYMPHOCYTES NFR BLD: 6.4 %
MAGNESIUM SERPL-MCNC: 2.3 MG/DL
MAGNESIUM SERPL-MCNC: 2.4 MG/DL
MCH RBC QN AUTO: 32.5 PG
MCH RBC QN AUTO: 32.8 PG
MCHC RBC AUTO-ENTMCNC: 36 %
MCHC RBC AUTO-ENTMCNC: 36 %
MCV RBC AUTO: 91 FL
MCV RBC AUTO: 91 FL
MODE: ABNORMAL
MODE: ABNORMAL
MONOCYTES # BLD AUTO: 1.3 K/UL
MONOCYTES # BLD AUTO: 1.9 K/UL
MONOCYTES NFR BLD: 10.4 %
MONOCYTES NFR BLD: 10.6 %
NEUTROPHILS # BLD AUTO: 10.1 K/UL
NEUTROPHILS # BLD AUTO: 14.1 K/UL
NEUTROPHILS NFR BLD: 75.8 %
NEUTROPHILS NFR BLD: 83 %
NUM UNITS TRANS FFP: NORMAL
NUM UNITS TRANS FFP: NORMAL
NUM UNITS TRANS PACKED RBC: NORMAL
PCO2 BLDA: 43 MMHG (ref 35–45)
PCO2 BLDA: 48.1 MMHG (ref 35–45)
PCO2 BLDA: 48.4 MMHG (ref 35–45)
PCO2 BLDA: 50.8 MMHG (ref 35–45)
PCO2 BLDA: 55.6 MMHG (ref 35–45)
PCO2 BLDA: 61.2 MMHG (ref 35–45)
PCO2 BLDA: 62.7 MMHG (ref 35–45)
PEEP: 5
PEEP: 5
PH SMN: 7.3 [PH] (ref 7.35–7.45)
PH SMN: 7.3 [PH] (ref 7.35–7.45)
PH SMN: 7.32 [PH] (ref 7.35–7.45)
PH SMN: 7.33 [PH] (ref 7.35–7.45)
PH SMN: 7.34 [PH] (ref 7.35–7.45)
PH SMN: 7.41 [PH] (ref 7.35–7.45)
PH SMN: 7.42 [PH] (ref 7.35–7.45)
PLATELET # BLD AUTO: 160 K/UL
PLATELET # BLD AUTO: 164 K/UL
PLATELET # BLD AUTO: 181 K/UL
PMV BLD AUTO: 9 FL
PMV BLD AUTO: 9.2 FL
PMV BLD AUTO: 9.3 FL
PO2 BLDA: 133 MMHG (ref 80–100)
PO2 BLDA: 196 MMHG (ref 80–100)
PO2 BLDA: 32 MMHG (ref 40–60)
PO2 BLDA: 375 MMHG (ref 80–100)
PO2 BLDA: 499 MMHG (ref 80–100)
PO2 BLDA: 521 MMHG (ref 80–100)
PO2 BLDA: 76 MMHG (ref 80–100)
POC ACTIVATED CLOTTING TIME K: 137 SEC (ref 74–137)
POC ACTIVATED CLOTTING TIME K: 142 SEC (ref 74–137)
POC ACTIVATED CLOTTING TIME K: 157 SEC (ref 74–137)
POC ACTIVATED CLOTTING TIME K: 409 SEC (ref 74–137)
POC ACTIVATED CLOTTING TIME K: 528 SEC (ref 74–137)
POC ACTIVATED CLOTTING TIME K: 775 SEC (ref 74–137)
POC BE: 0 MMOL/L
POC BE: 1 MMOL/L
POC BE: 1 MMOL/L
POC BE: 2 MMOL/L
POC BE: 5 MMOL/L
POC BE: 5 MMOL/L
POC BE: 6 MMOL/L
POC IONIZED CALCIUM: 0.92 MMOL/L (ref 1.06–1.42)
POC IONIZED CALCIUM: 1 MMOL/L (ref 1.06–1.42)
POC IONIZED CALCIUM: 1.05 MMOL/L (ref 1.06–1.42)
POC IONIZED CALCIUM: 1.13 MMOL/L (ref 1.06–1.42)
POC IONIZED CALCIUM: 1.18 MMOL/L (ref 1.06–1.42)
POC SATURATED O2: 100 % (ref 95–100)
POC SATURATED O2: 62 % (ref 95–100)
POC SATURATED O2: 93 % (ref 95–100)
POC SATURATED O2: 99 % (ref 95–100)
POCT GLUCOSE: 112 MG/DL (ref 70–110)
POCT GLUCOSE: 119 MG/DL (ref 70–110)
POCT GLUCOSE: 134 MG/DL (ref 70–110)
POCT GLUCOSE: 137 MG/DL (ref 70–110)
POCT GLUCOSE: 145 MG/DL (ref 70–110)
POCT GLUCOSE: 145 MG/DL (ref 70–110)
POCT GLUCOSE: 157 MG/DL (ref 70–110)
POCT GLUCOSE: 159 MG/DL (ref 70–110)
POCT GLUCOSE: 175 MG/DL (ref 70–110)
POCT GLUCOSE: 175 MG/DL (ref 70–110)
POTASSIUM BLD-SCNC: 4.3 MMOL/L (ref 3.5–5.1)
POTASSIUM BLD-SCNC: 4.6 MMOL/L (ref 3.5–5.1)
POTASSIUM BLD-SCNC: 4.7 MMOL/L (ref 3.5–5.1)
POTASSIUM BLD-SCNC: 5 MMOL/L (ref 3.5–5.1)
POTASSIUM BLD-SCNC: 5.3 MMOL/L (ref 3.5–5.1)
POTASSIUM SERPL-SCNC: 4.4 MMOL/L
POTASSIUM SERPL-SCNC: 4.6 MMOL/L
POTASSIUM SERPL-SCNC: 4.6 MMOL/L
PROTHROMBIN TIME: 12.2 SEC
PROTHROMBIN TIME: 14.5 SEC
PS: 10
PS: 10
RBC # BLD AUTO: 2.52 M/UL
RBC # BLD AUTO: 2.71 M/UL
SAMPLE: ABNORMAL
SAMPLE: NORMAL
SITE: ABNORMAL
SITE: ABNORMAL
SODIUM BLD-SCNC: 135 MMOL/L (ref 136–145)
SODIUM BLD-SCNC: 135 MMOL/L (ref 136–145)
SODIUM BLD-SCNC: 137 MMOL/L (ref 136–145)
SODIUM BLD-SCNC: 138 MMOL/L (ref 136–145)
SODIUM BLD-SCNC: 138 MMOL/L (ref 136–145)
SODIUM SERPL-SCNC: 140 MMOL/L
SODIUM SERPL-SCNC: 140 MMOL/L
TRANS PLATPHERESIS VOL PATIENT: NORMAL ML
TRANS PLATPHERESIS VOL PATIENT: NORMAL ML
VT: 480
VT: 550
WBC # BLD AUTO: 12.21 K/UL
WBC # BLD AUTO: 18.61 K/UL

## 2017-05-31 PROCEDURE — 25000003 PHARM REV CODE 250: Performed by: THORACIC SURGERY (CARDIOTHORACIC VASCULAR SURGERY)

## 2017-05-31 PROCEDURE — 36430 TRANSFUSION BLD/BLD COMPNT: CPT

## 2017-05-31 PROCEDURE — 86920 COMPATIBILITY TEST SPIN: CPT

## 2017-05-31 PROCEDURE — 88311 DECALCIFY TISSUE: CPT | Mod: 26,,, | Performed by: PATHOLOGY

## 2017-05-31 PROCEDURE — 99254 IP/OBS CNSLTJ NEW/EST MOD 60: CPT | Mod: ,,, | Performed by: INTERNAL MEDICINE

## 2017-05-31 PROCEDURE — 02RF08Z REPLACEMENT OF AORTIC VALVE WITH ZOOPLASTIC TISSUE, OPEN APPROACH: ICD-10-PCS | Performed by: THORACIC SURGERY (CARDIOTHORACIC VASCULAR SURGERY)

## 2017-05-31 PROCEDURE — 27000221 HC OXYGEN, UP TO 24 HOURS

## 2017-05-31 PROCEDURE — 37799 UNLISTED PX VASCULAR SURGERY: CPT

## 2017-05-31 PROCEDURE — 94002 VENT MGMT INPAT INIT DAY: CPT

## 2017-05-31 PROCEDURE — 27201423 OPTIME MED/SURG SUP & DEVICES STERILE SUPPLY: Performed by: THORACIC SURGERY (CARDIOTHORACIC VASCULAR SURGERY)

## 2017-05-31 PROCEDURE — 85018 HEMOGLOBIN: CPT

## 2017-05-31 PROCEDURE — 36000713 HC OR TIME LEV V EA ADD 15 MIN: Performed by: THORACIC SURGERY (CARDIOTHORACIC VASCULAR SURGERY)

## 2017-05-31 PROCEDURE — 99900037 HC PT THERAPY SCREENING (STAT)

## 2017-05-31 PROCEDURE — P9011 BLOOD SPLIT UNIT: HCPCS

## 2017-05-31 PROCEDURE — P9045 ALBUMIN (HUMAN), 5%, 250 ML: HCPCS | Performed by: PHYSICIAN ASSISTANT

## 2017-05-31 PROCEDURE — 36415 COLL VENOUS BLD VENIPUNCTURE: CPT

## 2017-05-31 PROCEDURE — 63600175 PHARM REV CODE 636 W HCPCS: Performed by: PHYSICIAN ASSISTANT

## 2017-05-31 PROCEDURE — P9017 PLASMA 1 DONOR FRZ W/IN 8 HR: HCPCS

## 2017-05-31 PROCEDURE — P9047 ALBUMIN (HUMAN), 25%, 50ML: HCPCS

## 2017-05-31 PROCEDURE — 86985 SPLIT BLOOD OR PRODUCTS: CPT

## 2017-05-31 PROCEDURE — 37000008 HC ANESTHESIA 1ST 15 MINUTES: Performed by: THORACIC SURGERY (CARDIOTHORACIC VASCULAR SURGERY)

## 2017-05-31 PROCEDURE — 27800903 OPTIME MED/SURG SUP & DEVICES OTHER IMPLANTS: Performed by: THORACIC SURGERY (CARDIOTHORACIC VASCULAR SURGERY)

## 2017-05-31 PROCEDURE — P9016 RBC LEUKOCYTES REDUCED: HCPCS

## 2017-05-31 PROCEDURE — 71000028 HC RECOVERY HIGH ACUITY/ PER HOUR

## 2017-05-31 PROCEDURE — 5A1221Z PERFORMANCE OF CARDIAC OUTPUT, CONTINUOUS: ICD-10-PCS | Performed by: THORACIC SURGERY (CARDIOTHORACIC VASCULAR SURGERY)

## 2017-05-31 PROCEDURE — 20000000 HC ICU ROOM

## 2017-05-31 PROCEDURE — 25000003 PHARM REV CODE 250

## 2017-05-31 PROCEDURE — 88305 TISSUE EXAM BY PATHOLOGIST: CPT | Performed by: PATHOLOGY

## 2017-05-31 PROCEDURE — 63600175 PHARM REV CODE 636 W HCPCS

## 2017-05-31 PROCEDURE — 82803 BLOOD GASES ANY COMBINATION: CPT

## 2017-05-31 PROCEDURE — 99291 CRITICAL CARE FIRST HOUR: CPT | Mod: ,,, | Performed by: NURSE PRACTITIONER

## 2017-05-31 PROCEDURE — 88305 TISSUE EXAM BY PATHOLOGIST: CPT | Mod: 26,,, | Performed by: PATHOLOGY

## 2017-05-31 PROCEDURE — 25000003 PHARM REV CODE 250: Performed by: PHYSICIAN ASSISTANT

## 2017-05-31 PROCEDURE — 63600175 PHARM REV CODE 636 W HCPCS: Performed by: THORACIC SURGERY (CARDIOTHORACIC VASCULAR SURGERY)

## 2017-05-31 PROCEDURE — C1729 CATH, DRAINAGE: HCPCS | Performed by: THORACIC SURGERY (CARDIOTHORACIC VASCULAR SURGERY)

## 2017-05-31 PROCEDURE — 36000712 HC OR TIME LEV V 1ST 15 MIN: Performed by: THORACIC SURGERY (CARDIOTHORACIC VASCULAR SURGERY)

## 2017-05-31 PROCEDURE — 85610 PROTHROMBIN TIME: CPT | Mod: 91

## 2017-05-31 PROCEDURE — 82550 ASSAY OF CK (CPK): CPT

## 2017-05-31 PROCEDURE — P9035 PLATELET PHERES LEUKOREDUCED: HCPCS

## 2017-05-31 PROCEDURE — 85049 AUTOMATED PLATELET COUNT: CPT

## 2017-05-31 PROCEDURE — 37000009 HC ANESTHESIA EA ADD 15 MINS: Performed by: THORACIC SURGERY (CARDIOTHORACIC VASCULAR SURGERY)

## 2017-05-31 PROCEDURE — 85014 HEMATOCRIT: CPT

## 2017-05-31 PROCEDURE — 80048 BASIC METABOLIC PNL TOTAL CA: CPT

## 2017-05-31 PROCEDURE — 85730 THROMBOPLASTIN TIME PARTIAL: CPT

## 2017-05-31 PROCEDURE — 83735 ASSAY OF MAGNESIUM: CPT

## 2017-05-31 PROCEDURE — 85025 COMPLETE CBC W/AUTO DIFF WBC: CPT | Mod: 91

## 2017-05-31 DEVICE — IMPLANTABLE DEVICE: Type: IMPLANTABLE DEVICE | Site: HEART | Status: FUNCTIONAL

## 2017-05-31 RX ORDER — MAGNESIUM SULFATE HEPTAHYDRATE 40 MG/ML
2 INJECTION, SOLUTION INTRAVENOUS
Status: DISCONTINUED | OUTPATIENT
Start: 2017-05-31 | End: 2017-06-02

## 2017-05-31 RX ORDER — FAMOTIDINE 10 MG/ML
20 INJECTION INTRAVENOUS 2 TIMES DAILY
Status: DISCONTINUED | OUTPATIENT
Start: 2017-05-31 | End: 2017-06-01

## 2017-05-31 RX ORDER — HEPARIN SODIUM 1000 [USP'U]/ML
INJECTION, SOLUTION INTRAVENOUS; SUBCUTANEOUS
Status: DISCONTINUED | OUTPATIENT
Start: 2017-05-31 | End: 2017-05-31 | Stop reason: HOSPADM

## 2017-05-31 RX ORDER — HYDROCODONE BITARTRATE AND ACETAMINOPHEN 500; 5 MG/1; MG/1
TABLET ORAL
Status: DISCONTINUED | OUTPATIENT
Start: 2017-05-31 | End: 2017-06-01

## 2017-05-31 RX ORDER — THIAMINE HCL 100 MG
100 TABLET ORAL DAILY
Status: DISCONTINUED | OUTPATIENT
Start: 2017-06-01 | End: 2017-06-06 | Stop reason: HOSPADM

## 2017-05-31 RX ORDER — ENOXAPARIN SODIUM 100 MG/ML
40 INJECTION SUBCUTANEOUS
Status: DISCONTINUED | OUTPATIENT
Start: 2017-06-01 | End: 2017-06-06 | Stop reason: HOSPADM

## 2017-05-31 RX ORDER — METOPROLOL TARTRATE 25 MG/1
25 TABLET, FILM COATED ORAL 2 TIMES DAILY
Status: DISCONTINUED | OUTPATIENT
Start: 2017-06-01 | End: 2017-06-01

## 2017-05-31 RX ORDER — CLOPIDOGREL BISULFATE 75 MG/1
75 TABLET ORAL DAILY
Status: DISCONTINUED | OUTPATIENT
Start: 2017-06-01 | End: 2017-06-06 | Stop reason: HOSPADM

## 2017-05-31 RX ORDER — MIDAZOLAM HYDROCHLORIDE 1 MG/ML
1 INJECTION INTRAMUSCULAR; INTRAVENOUS
Status: DISCONTINUED | OUTPATIENT
Start: 2017-05-31 | End: 2017-06-01

## 2017-05-31 RX ORDER — MIDAZOLAM HYDROCHLORIDE 1 MG/ML
2 INJECTION INTRAMUSCULAR; INTRAVENOUS
Status: DISCONTINUED | OUTPATIENT
Start: 2017-05-31 | End: 2017-06-01

## 2017-05-31 RX ORDER — DOCUSATE SODIUM 100 MG/1
100 CAPSULE, LIQUID FILLED ORAL 2 TIMES DAILY
Status: DISCONTINUED | OUTPATIENT
Start: 2017-06-01 | End: 2017-06-06 | Stop reason: HOSPADM

## 2017-05-31 RX ORDER — MORPHINE SULFATE 2 MG/ML
2 INJECTION, SOLUTION INTRAMUSCULAR; INTRAVENOUS
Status: DISCONTINUED | OUTPATIENT
Start: 2017-05-31 | End: 2017-06-02

## 2017-05-31 RX ORDER — POTASSIUM CHLORIDE 14.9 MG/ML
60 INJECTION INTRAVENOUS
Status: DISCONTINUED | OUTPATIENT
Start: 2017-05-31 | End: 2017-06-02

## 2017-05-31 RX ORDER — ASPIRIN 81 MG/1
81 TABLET ORAL DAILY
Status: DISCONTINUED | OUTPATIENT
Start: 2017-06-01 | End: 2017-06-06 | Stop reason: HOSPADM

## 2017-05-31 RX ORDER — NOREPINEPHRINE BITARTRATE/D5W 4MG/250ML
0.02 PLASTIC BAG, INJECTION (ML) INTRAVENOUS CONTINUOUS
Status: DISCONTINUED | OUTPATIENT
Start: 2017-05-31 | End: 2017-06-01

## 2017-05-31 RX ORDER — DEXTROSE MONOHYDRATE AND SODIUM CHLORIDE 5; .225 G/100ML; G/100ML
50 INJECTION, SOLUTION INTRAVENOUS CONTINUOUS
Status: DISCONTINUED | OUTPATIENT
Start: 2017-05-31 | End: 2017-06-01

## 2017-05-31 RX ORDER — IBUPROFEN 200 MG
1 TABLET ORAL DAILY
Status: DISCONTINUED | OUTPATIENT
Start: 2017-06-01 | End: 2017-06-06 | Stop reason: HOSPADM

## 2017-05-31 RX ORDER — NICARDIPINE HYDROCHLORIDE 0.2 MG/ML
5 INJECTION INTRAVENOUS CONTINUOUS
Status: DISCONTINUED | OUTPATIENT
Start: 2017-06-01 | End: 2017-06-01

## 2017-05-31 RX ORDER — POTASSIUM CHLORIDE 20 MEQ/1
40 TABLET, EXTENDED RELEASE ORAL EVERY 12 HOURS
Status: DISCONTINUED | OUTPATIENT
Start: 2017-06-01 | End: 2017-06-02

## 2017-05-31 RX ORDER — HYDROCODONE BITARTRATE AND ACETAMINOPHEN 500; 5 MG/1; MG/1
TABLET ORAL
Status: DISCONTINUED | OUTPATIENT
Start: 2017-05-31 | End: 2017-06-02

## 2017-05-31 RX ORDER — HYDROCODONE BITARTRATE AND ACETAMINOPHEN 10; 325 MG/1; MG/1
1 TABLET ORAL EVERY 6 HOURS PRN
Status: DISCONTINUED | OUTPATIENT
Start: 2017-05-31 | End: 2017-06-06 | Stop reason: HOSPADM

## 2017-05-31 RX ORDER — SODIUM CHLORIDE, SODIUM LACTATE, POTASSIUM CHLORIDE, CALCIUM CHLORIDE 600; 310; 30; 20 MG/100ML; MG/100ML; MG/100ML; MG/100ML
INJECTION, SOLUTION INTRAVENOUS CONTINUOUS
Status: DISCONTINUED | OUTPATIENT
Start: 2017-05-31 | End: 2017-05-31

## 2017-05-31 RX ORDER — HYDROCODONE BITARTRATE AND ACETAMINOPHEN 5; 325 MG/1; MG/1
1 TABLET ORAL EVERY 4 HOURS PRN
Status: DISCONTINUED | OUTPATIENT
Start: 2017-05-31 | End: 2017-06-06 | Stop reason: HOSPADM

## 2017-05-31 RX ORDER — HYDROCODONE BITARTRATE AND ACETAMINOPHEN 7.5; 325 MG/1; MG/1
1 TABLET ORAL EVERY 6 HOURS PRN
Status: DISCONTINUED | OUTPATIENT
Start: 2017-05-31 | End: 2017-06-06 | Stop reason: HOSPADM

## 2017-05-31 RX ORDER — HYDROCODONE BITARTRATE AND ACETAMINOPHEN 500; 5 MG/1; MG/1
TABLET ORAL
Status: DISCONTINUED | OUTPATIENT
Start: 2017-05-31 | End: 2017-05-31 | Stop reason: HOSPADM

## 2017-05-31 RX ORDER — ALBUMIN HUMAN 50 G/1000ML
250 SOLUTION INTRAVENOUS
Status: DISCONTINUED | OUTPATIENT
Start: 2017-05-31 | End: 2017-06-02

## 2017-05-31 RX ORDER — MUPIROCIN 20 MG/G
1 OINTMENT TOPICAL 2 TIMES DAILY
Status: COMPLETED | OUTPATIENT
Start: 2017-05-31 | End: 2017-06-03

## 2017-05-31 RX ORDER — MORPHINE SULFATE 10 MG/ML
5 INJECTION INTRAMUSCULAR; INTRAVENOUS; SUBCUTANEOUS
Status: DISCONTINUED | OUTPATIENT
Start: 2017-05-31 | End: 2017-06-01

## 2017-05-31 RX ORDER — FUROSEMIDE 10 MG/ML
40 INJECTION INTRAMUSCULAR; INTRAVENOUS 2 TIMES DAILY
Status: DISPENSED | OUTPATIENT
Start: 2017-06-01 | End: 2017-06-04

## 2017-05-31 RX ORDER — BACITRACIN 50000 [IU]/1
INJECTION, POWDER, FOR SOLUTION INTRAMUSCULAR
Status: DISCONTINUED | OUTPATIENT
Start: 2017-05-31 | End: 2017-05-31 | Stop reason: HOSPADM

## 2017-05-31 RX ORDER — POTASSIUM CHLORIDE 29.8 MG/ML
40 INJECTION INTRAVENOUS
Status: DISCONTINUED | OUTPATIENT
Start: 2017-05-31 | End: 2017-06-02

## 2017-05-31 RX ORDER — SODIUM CHLORIDE, SODIUM LACTATE, POTASSIUM CHLORIDE, CALCIUM CHLORIDE 600; 310; 30; 20 MG/100ML; MG/100ML; MG/100ML; MG/100ML
1000 INJECTION, SOLUTION INTRAVENOUS
Status: DISCONTINUED | OUTPATIENT
Start: 2017-05-31 | End: 2017-06-06 | Stop reason: HOSPADM

## 2017-05-31 RX ORDER — DEXMEDETOMIDINE HYDROCHLORIDE 4 UG/ML
0.2 INJECTION, SOLUTION INTRAVENOUS CONTINUOUS PRN
Status: DISCONTINUED | OUTPATIENT
Start: 2017-05-31 | End: 2017-06-02

## 2017-05-31 RX ORDER — CEFAZOLIN SODIUM 2 G/50ML
2 SOLUTION INTRAVENOUS
Status: COMPLETED | OUTPATIENT
Start: 2017-05-31 | End: 2017-06-01

## 2017-05-31 RX ORDER — ONDANSETRON 2 MG/ML
4 INJECTION INTRAMUSCULAR; INTRAVENOUS EVERY 12 HOURS PRN
Status: DISCONTINUED | OUTPATIENT
Start: 2017-05-31 | End: 2017-06-06 | Stop reason: HOSPADM

## 2017-05-31 RX ORDER — POTASSIUM CHLORIDE 14.9 MG/ML
20 INJECTION INTRAVENOUS
Status: DISCONTINUED | OUTPATIENT
Start: 2017-05-31 | End: 2017-06-02

## 2017-05-31 RX ORDER — MIDAZOLAM HYDROCHLORIDE 2 MG/2ML
1 INJECTION, SOLUTION INTRAMUSCULAR; INTRAVENOUS
Status: DISCONTINUED | OUTPATIENT
Start: 2017-05-31 | End: 2017-05-31 | Stop reason: SDUPTHER

## 2017-05-31 RX ADMIN — VANCOMYCIN HYDROCHLORIDE 1000 MG: 1 INJECTION, POWDER, LYOPHILIZED, FOR SOLUTION INTRAVENOUS at 07:05

## 2017-05-31 RX ADMIN — MORPHINE SULFATE 2 MG: 2 INJECTION, SOLUTION INTRAMUSCULAR; INTRAVENOUS at 07:05

## 2017-05-31 RX ADMIN — MORPHINE SULFATE 2 MG: 2 INJECTION, SOLUTION INTRAMUSCULAR; INTRAVENOUS at 01:05

## 2017-05-31 RX ADMIN — MORPHINE SULFATE 2 MG: 2 INJECTION, SOLUTION INTRAMUSCULAR; INTRAVENOUS at 11:05

## 2017-05-31 RX ADMIN — MIDAZOLAM HYDROCHLORIDE 2 MG: 1 INJECTION, SOLUTION INTRAMUSCULAR; INTRAVENOUS at 01:05

## 2017-05-31 RX ADMIN — ALBUMIN (HUMAN) 250 ML: 12.5 SOLUTION INTRAVENOUS at 07:05

## 2017-05-31 RX ADMIN — MUPIROCIN 1 G: 20 OINTMENT TOPICAL at 08:05

## 2017-05-31 RX ADMIN — MIDAZOLAM HYDROCHLORIDE 2 MG: 1 INJECTION, SOLUTION INTRAMUSCULAR; INTRAVENOUS at 12:05

## 2017-05-31 RX ADMIN — NICARDIPINE HYDROCHLORIDE 5 MG/HR: 0.2 INJECTION, SOLUTION INTRAVENOUS at 11:05

## 2017-05-31 RX ADMIN — HYDROCODONE BITARTRATE AND ACETAMINOPHEN 1 TABLET: 7.5; 325 TABLET ORAL at 07:05

## 2017-05-31 RX ADMIN — DEXTROSE AND SODIUM CHLORIDE 50 ML/HR: 5; .2 INJECTION, SOLUTION INTRAVENOUS at 01:05

## 2017-05-31 RX ADMIN — Medication 0.02 MCG/KG/MIN: at 11:05

## 2017-05-31 RX ADMIN — DEXMEDETOMIDINE HYDROCHLORIDE 0.2 MCG/KG/HR: 4 INJECTION, SOLUTION INTRAVENOUS at 01:05

## 2017-05-31 RX ADMIN — MORPHINE SULFATE 2 MG: 2 INJECTION, SOLUTION INTRAMUSCULAR; INTRAVENOUS at 03:05

## 2017-05-31 RX ADMIN — MORPHINE SULFATE 2 MG: 2 INJECTION, SOLUTION INTRAMUSCULAR; INTRAVENOUS at 08:05

## 2017-05-31 RX ADMIN — MORPHINE SULFATE 2 MG: 2 INJECTION, SOLUTION INTRAMUSCULAR; INTRAVENOUS at 10:05

## 2017-05-31 RX ADMIN — FAMOTIDINE 20 MG: 10 INJECTION, SOLUTION INTRAVENOUS at 08:05

## 2017-05-31 RX ADMIN — SODIUM CHLORIDE 1 UNITS/HR: 9 INJECTION, SOLUTION INTRAVENOUS at 12:05

## 2017-05-31 RX ADMIN — CEFAZOLIN SODIUM 2 G: 2 SOLUTION INTRAVENOUS at 07:05

## 2017-05-31 RX ADMIN — MORPHINE SULFATE 2 MG: 2 INJECTION, SOLUTION INTRAMUSCULAR; INTRAVENOUS at 06:05

## 2017-05-31 NOTE — OP NOTE
DATE OF PROCEDURE:  05/31/2017    PREOPERATIVE DIAGNOSES:  1.  Severe aortic stenosis.  2.  Hypertension.  3.  Obesity.  4.  Tobacco abuse.    POSTOPERATIVE DIAGNOSES:  1.  Severe aortic stenosis.  2.  Hypertension.  3.  Obesity.  4.  Tobacco abuse.    PROCEDURES:  1.  A 25 mm tissue aortic valve replacement.  2.  Transesophageal echo.    SURGEON:  Gerardo Hernandez M.D.    ASSISTANT:  Indu Edmond PA-C.    ANESTHESIA:  General.    FINDINGS:  1.  Severely calcified tricuspid aortic valve.  2.  Hyperdynamic ventricular function.  3.  Normal coronary.    SPECIMENS:  Aortic valve.    DRAINS:  Two 24-[place] chest tubes, 1 mediastinal and 1 right pleural.  Wires; 1   bipolar ventricular pacing wires.    COMPLICATIONS:  None.    CONDITION:  Stable.    INDICATION FOR OPERATION:  Mr. Kim is a 56-year-old gentleman who presented   after an episode of shortness of breath and heart failure.  Cardiac workup   revealed severe aortic stenosis and he was referred for aortic valve   replacement.  We discussed the type of valve and he elected to proceed with a   tissue valve.  We also discussed the risks, benefits and alternatives in detail.    DESCRIPTION OF PROCEDURE:  The patient brought to the Operating Room, placed in   supine position.  He was given Anesthesia.  Preoperative lines were placed.    Preoperative antibiotics were infused.  He was then prepped and draped in   standard fashion.  A standard midline sternal incision was made.  This was   carried down the sternum, which was split with a saw.  A standard sternal   retractor was inserted and chest was opened widely.  The pericardium was opened   and the appropriate dosage of heparin was given.  The ascending aorta was then   cannulated in standard fashion.  The right atrial appendage was then cannulated   in the standard fashion.  We then went on cardiopulmonary bypass and a left   ventricular vent was placed via the right superior pulmonary vein.  The aorta   was then  crossclamped and cardioplegia was administered antegrade through the   aortic root.  Once this was done, an aortotomy was made.  The valve was   identified as tricuspid.  It was severely calcified.  The valve leaflets were   then excised and the annulus was debrided of all calcium.  The annulus was then   sized to 25 mm.  Annular pledgeted sutures were then placed.  The valve sutures   were then placed through the sewing cuff of the valve.  The valve was then   lowered and seated in place.  Valve was secured with a Cor-Knot device.  The   aorta and bowel were irrigated.  Once this was done, the aortotomy was closed in   layers with 5-0 Prolene suture.  The patient was then placed in Trendelenburg   and the heart and the aorta were de-aired.  The cross clamp was then removed.    The heart was then allowed to regain its natural rhythm.  Once the heart had   been de-aired, the cardioplegia site was repaired with a Prolene suture.  We   then weaned from cardiopulmonary bypass without issue and the left ventricular   vent was removed.  The protamine was administered and the venous cannula was   removed from the right atrial appendage.  Following that, the cannula was   removed from the ascending aorta.  Two 24-Khmer chest tubes were inserted, 1   mediastinal, 1 right pleural.  A bipolar ventricular pacing wire was placed on   the surface of the right ventricle.  Once we were satisfied with meticulous   hemostasis, the sternum was then reapproximated with wires and the skin and   subcutaneous tissues were closed in layers.  The patient tolerated the procedure   well and was taken to ICU in stable condition.  Total cardiopulmonary bypass   time was 82 minutes.  Total crossclamp time was 71 minutes.      DAISY  dd: 05/31/2017 11:19:06 (CDT)  td: 05/31/2017 15:07:05 (CD)  Doc ID   #[id number]  Job ID #[id number]    CC:

## 2017-05-31 NOTE — PT/OT/SLP PROGRESS
Physical Therapy      Sukhjinder Kim  MRN: [id number]    P.T. EVAL INITIATED VIA CHART REVIEW THIS AM, PT S/P AVR SX. TODAY, WILL COMPLETE P.T. EVAL ONCE PT MEDICALLY APPROPRIATE    Tori Zavaleta, PT   5/31/2017  1200

## 2017-05-31 NOTE — CONSULTS
Consult Note  Cardiology    Consult Requested By: HAMLET Go   Reason for Consult: Post AVR     SUBJECTIVE:     History of Present Illness:  Patient is a 56 y.o. male who has PMH of HTN, tobacco use, PAF (short period after using cocaine), cocaine abuser, alcohol  and strong f/h premature valvular disease, (father and young brother had valvular replacement). Patient is s/p 25 mm aortic perimount magna ease valve. Tolerated procedure well. Is intubated and sedated in ICU.  Had issues with hypotension upon transfer to ICU which resulted in starting Levophed. Noted to have drop in H/H post procedure. Had about 700cc output on chest tube, but has stabilized now.  Noted to have normal coronaries on angiogram pre AVR     Review of patient's allergies indicates:  No Known Allergies    Past Medical History:   Diagnosis Date    Hypertension     S/P AVR (aortic valve replacement) 5/31/2017    Tobacco abuse      Past Surgical History:   Procedure Laterality Date    APPENDECTOMY       Family History   Problem Relation Age of Onset    Valvular heart disease Father      64 yo    Heart attacks under age 50 Father 42    Valvular heart disease Brother 27     valve Replacement surgery    Valvular heart disease Brother 62     valvue Replacement     Social History   Substance Use Topics    Smoking status: Current Every Day Smoker     Packs/day: 0.20     Years: 40.00     Types: Cigarettes    Smokeless tobacco: Not on file      Comment: no smoking after m.n prior to surgery    Alcohol use Yes      Comment: daily - 6 pack beer  No alcohol prior to surgery        Review of Systems:  Unable to obtain full ROS due to patient being intubated and sedated.     OBJECTIVE:     Vital Signs (Most Recent)  Temp: 98 °F (36.7 °C) (05/31/17 1455)  Pulse: 73 (05/31/17 1455)  Resp: 16 (05/31/17 1455)  BP: (!) 106/49 (05/31/17 1455)  SpO2: 100 % (05/31/17 1455)    Vital Signs Range (Last 24H):  Temp:  [97.4 °F (36.3 °C)-98 °F (36.7  °C)]   Pulse:  [58-81]   Resp:  [15-20]   BP: ()/(31-90)   SpO2:  [97 %-100 %]   Arterial Line BP: ()/(30-50)     Current Facility-Administered Medications   Medication    0.9%  NaCl infusion (for blood administration)    0.9%  NaCl infusion (for blood administration)    0.9%  NaCl infusion (for blood administration)    albumin human 5% bottle 250 mL    [START ON 6/1/2017] aspirin EC tablet 81 mg    cefazolin (ANCEF) 2 gram in dextrose 5% 50 mL IVPB (premix)    [START ON 6/1/2017] clopidogrel tablet 75 mg    dexmedetomidine (PRECEDEX) 400mcg/100mL 0.9% NaCL infusion    dextrose 5 % and 0.2 % NaCl infusion    dextrose 50% injection 12.5 g    dextrose 50% injection 25 g    [START ON 6/1/2017] docusate sodium capsule 100 mg    [START ON 6/1/2017] enoxaparin injection 40 mg    famotidine (PF) injection 20 mg    [START ON 6/1/2017] furosemide injection 40 mg    hydrocodone-acetaminophen 10-325mg per tablet 1 tablet    hydrocodone-acetaminophen 5-325mg per tablet 1 tablet    hydrocodone-acetaminophen 7.5-325mg per tablet 1 tablet    insulin regular (Humulin R) 100 Units in sodium chloride 0.9% 100 mL infusion    lactated ringers infusion    magnesium sulfate 2g in water 50mL IVPB (premix)    [START ON 6/1/2017] metoprolol tartrate (LOPRESSOR) tablet 25 mg    midazolam injection 1 mg    midazolam injection 2 mg    morphine injection 2 mg    morphine injection 5 mg    mupirocin 2 % ointment 1 g    ondansetron injection 4 mg    potassium chloride 20 mEq in 100 mL IVPB (FOR CENTRAL LINE ADMINISTRATION ONLY)    And    potassium chloride 40 mEq in 100 mL IVPB (FOR CENTRAL LINE ADMINISTRATION ONLY)    And    potassium chloride 20 mEq in 100 mL IVPB (FOR CENTRAL LINE ADMINISTRATION ONLY)    [START ON 6/1/2017] potassium chloride SA CR tablet 40 mEq    promethazine (PHENERGAN) 6.25 mg in dextrose 5 % 50 mL IVPB    vancomycin 1 g in dextrose 5 % 250 mL IVPB (ready to mix system)     No  current facility-administered medications on file prior to encounter.      Current Outpatient Prescriptions on File Prior to Encounter   Medication Sig    aspirin (ECOTRIN) 81 MG EC tablet Take 1 tablet (81 mg total) by mouth once daily.    diltiaZEM (CARDIZEM CD) 240 MG 24 hr capsule Take 1 capsule (240 mg total) by mouth once daily. (Patient taking differently: Take 240 mg by mouth 2 (two) times daily. )       Physical Exam:  General appearance: intubated and sedated   Head: Normocephalic, without obvious abnormality, atraumatic  Neck: no adenopathy, no carotid bruit, no JVD  Lungs:  Intubated. clear to auscultation bilaterally  Chest wall: mid sternal surgical scar. Dressing C/D/I . Chest tube present   Heart: regular rate and rhythm, S1, S2 normal, no murmur, click, rub or gallop  Abdomen: soft, non-tender; bowel sounds normal; no masses,  no organomegaly  Extremities: extremities normal, atraumatic, no cyanosis or edema  Pulses: 2+ and symmetric  Skin: Skin color, texture, turgor normal. No rashes or lesions  Neurologic: sedated     Laboratory:  Chemistry:   Lab Results   Component Value Date     05/31/2017    K 4.6 05/31/2017    K 4.6 05/31/2017     05/31/2017    CO2 26 05/31/2017    BUN 11 05/31/2017    CREATININE 0.7 05/31/2017    CALCIUM 7.2 (L) 05/31/2017     Cardiac Markers:   Lab Results   Component Value Date    TROPONINI 0.042 (H) 04/06/2017     Cardiac Markers (Last 3):   Lab Results   Component Value Date    TROPONINI 0.042 (H) 04/06/2017    TROPONINI 0.038 (H) 04/06/2017    TROPONINI 0.043 (H) 04/06/2017     CBC:   Lab Results   Component Value Date    WBC 18.61 (H) 05/31/2017    HGB 8.9 (L) 05/31/2017    HCT 24.7 (L) 05/31/2017    MCV 91 05/31/2017     05/31/2017     Lipids: No results found for: CHOL, TRIG, HDL, LDLDIRECT  Coagulation:   Lab Results   Component Value Date    INR 1.4 (H) 05/31/2017    APTT 44.0 (H) 05/31/2017       Diagnostic Results:  ECG: Reviewed  X-Ray:  Reviewed  Echo: Reviewed      ASSESSMENT/PLAN:     Patient Active Problem List   Diagnosis    Paroxysmal atrial fibrillation    Cocaine abuse    Tobacco use    Alcohol use    Aortic stenosis, severe    Essential hypertension    Aortic stenosis    Aortic valve disease    S/P AVR (aortic valve replacement)        Plan:    Continue current post AVR replacement medical management.  Wean Levophed as tolerated. Will continue to follow    Chart reviewed. Patient examined by Dr. Traore and agrees with plan that has been outlined.

## 2017-05-31 NOTE — BRIEF OP NOTE
[facility]  Surgery Department  Operative Note    SUMMARY     Date of Procedure: 5/31/2017     Procedure: Procedure(s) (LRB):  REPLACEMENT-VALVE-AORTIC, LAST (N/A)     Surgeon(s) and Role:     * Gerardo Hernandez MD - Primary    Assisting Surgeon: None    Pre-Operative Diagnosis: Acquired stenosis of aortic valve [I35.0]    Post-Operative Diagnosis: Post-Op Diagnosis Codes:     * Acquired stenosis of aortic valve [I35.0]    Anesthesia: General    Technical Procedures Used: LAST    Description of the Findings of the Procedure: severely calcified aortic valve, tricuspid    Complications: No    Estimated Blood Loss (EBL): * No values recorded between 5/31/2017  8:10 AM and 5/31/2017 11:10 AM *           Implants:   Implant Name Type Inv. Item Serial No.  Lot No. LRB No. Used   25 mm aortic perimount magna ease valve     [id number] [employer] [id number] N/A 1       Specimens:   Specimen (12h ago through future)    Start     Ordered    05/31/17 1004  Specimen to Pathology - Surgery  Once     Comments:  Aortic valve      05/31/17 1004                  Condition: Good    Disposition: ICU - intubated and hemodynamically stable.    Attestation: I was present and scrubbed for the entire procedure.

## 2017-05-31 NOTE — CONSULTS
Inpatient consult to Pulmonology  Consult performed by: SILVINO MONET  Consult ordered by: HUMAIRA SHEPHERD  Reason for consult: vent management        Critical Care Consult      Chief Complaint:  Post Op AVR    HPI:    Sukhjinder Kim is a 56 y.o. male with a PMH of HTN, paroxysmal atrial fib, severe aortic valve stenosis, obesity, and tobacco, alcohol, marijuana, and cocaine abuse who presented to [facility] today for planned aortic valve replacement.  Surgery was successful with tissue valve replacement and he is admitted to ICU post operatively with OETT on mechanical ventilation and low dose pressor support    PMHx:      Past Medical History:   Diagnosis Date    Hypertension     Tobacco abuse         PMSx:      Past Surgical History:   Procedure Laterality Date    APPENDECTOMY          Social Hx:      Social History     Social History    Marital status: Single     Spouse name: N/A    Number of children: N/A    Years of education: N/A     Occupational History    Not on file.     Social History Main Topics    Smoking status: Current Every Day Smoker     Packs/day: 0.20     Years: 40.00     Types: Cigarettes    Smokeless tobacco: Not on file      Comment: no smoking after m.n prior to surgery    Alcohol use Yes      Comment: daily - 6 pack beer  No alcohol prior to surgery    Drug use:      Types: Marijuana, Cocaine      Comment: no cocaine/marijuana prior to surgery    Sexual activity: Not on file     Other Topics Concern    Not on file     Social History Narrative    Lives by himself        Family Hx:      Family History   Problem Relation Age of Onset    Valvular heart disease Father      64 yo    Heart attacks under age 50 Father 42    Valvular heart disease Brother 27     valve Replacement surgery    Valvular heart disease Brother 62     valvue Replacement        Allergies:      Review of patient's allergies indicates:  No Known Allergies    Medications:    Home medications prior to admission  reviewed.  Current Facility-Administered Medications   Medication    0.9%  NaCl infusion (for blood administration)    0.9%  NaCl infusion (for blood administration)    0.9%  NaCl infusion (for blood administration)    albumin human 5% bottle 250 mL    [START ON 6/1/2017] aspirin EC tablet 81 mg    cefazolin (ANCEF) 2 gram in dextrose 5% 50 mL IVPB (premix)    [START ON 6/1/2017] clopidogrel tablet 75 mg    dexmedetomidine (PRECEDEX) 400mcg/100mL 0.9% NaCL infusion    dextrose 5 % and 0.2 % NaCl infusion    dextrose 50% injection 12.5 g    dextrose 50% injection 25 g    [START ON 6/1/2017] docusate sodium capsule 100 mg    [START ON 6/1/2017] enoxaparin injection 40 mg    famotidine (PF) injection 20 mg    [START ON 6/1/2017] furosemide injection 40 mg    hydrocodone-acetaminophen 10-325mg per tablet 1 tablet    hydrocodone-acetaminophen 5-325mg per tablet 1 tablet    hydrocodone-acetaminophen 7.5-325mg per tablet 1 tablet    insulin regular (Humulin R) 100 Units in sodium chloride 0.9% 100 mL infusion    lactated ringers infusion    magnesium sulfate 2g in water 50mL IVPB (premix)    [START ON 6/1/2017] metoprolol tartrate (LOPRESSOR) tablet 25 mg    midazolam injection 1 mg    midazolam injection 2 mg    morphine injection 2 mg    morphine injection 5 mg    mupirocin 2 % ointment 1 g    ondansetron injection 4 mg    potassium chloride 20 mEq in 100 mL IVPB (FOR CENTRAL LINE ADMINISTRATION ONLY)    And    potassium chloride 40 mEq in 100 mL IVPB (FOR CENTRAL LINE ADMINISTRATION ONLY)    And    potassium chloride 20 mEq in 100 mL IVPB (FOR CENTRAL LINE ADMINISTRATION ONLY)    [START ON 6/1/2017] potassium chloride SA CR tablet 40 mEq    promethazine (PHENERGAN) 6.25 mg in dextrose 5 % 50 mL IVPB    vancomycin 1 g in dextrose 5 % 250 mL IVPB (ready to mix system)       ROS:  Unable s/t OETT and sedation on mechanical ventilation    Vital Signs (Most Recent)  Temp: 97.8 °F (36.6 °C)  (05/31/17 1423)  Pulse: 72 (05/31/17 1423)  Resp: 16 (05/31/17 1423)  BP: (!) 104/55 (05/31/17 1423)  SpO2: 100 % (05/31/17 1423)    Vital Signs Range (Last 24H):  Temp:  [97.4 °F (36.3 °C)-97.9 °F (36.6 °C)]   Pulse:  [58-81]   Resp:  [15-20]   BP: ()/(31-90)   SpO2:  [97 %-100 %]   Arterial Line BP: ()/(30-50)     I & O (Last 24H):  Intake/Output Summary (Last 24 hours) at 05/31/17 1440  Last data filed at 05/31/17 1341   Gross per 24 hour   Intake          4427.75 ml   Output             1450 ml   Net          2977.75 ml     Ventilator Data (Last 24H):     Vent Mode: SIMV  Oxygen Concentration (%):  [50] 50  Resp Rate Total:  [16 br/min-21 br/min] 21 br/min  Vt Set:  [500 mL-550 mL] 550 mL  PEEP/CPAP:  [5 cmH20] 5 cmH20  Pressure Support:  [10 cmH20] 10 cmH20  Mean Airway Pressure:  [9.3 cmH20-9.8 cmH20] 9.8 cmH20    Physical Exam   Constitutional: He appears well-developed. He has a sickly appearance. He is sedated and intubated.   HENT:   Head: Normocephalic.   Eyes: Conjunctivae are normal.   Pinpoint pupils bilaterally   Neck: No JVD present. No tracheal deviation present.   Cardiovascular: Normal rate and regular rhythm.    No murmur heard.  Pulses:       Radial pulses are 2+ on the right side, and 2+ on the left side.        Dorsalis pedis pulses are 2+ on the right side, and 2+ on the left side.   Pulmonary/Chest: He is intubated. He has no decreased breath sounds. He has no wheezes. He has no rales.   Abdominal: Soft. Bowel sounds are decreased. There is no tenderness.   Skin: Skin is warm and dry. Capillary refill takes less than 2 seconds.       Laboratory (Last 24H):  CBC:    Recent Labs  Lab 05/31/17  1125   WBC 18.61*   HGB 8.9*   HCT 24.7*        CMP:    Recent Labs  Lab 05/31/17  1125   CALCIUM 7.2*      K 4.6  4.6   CO2 26      BUN 11   CREATININE 0.7       Labs within the past 24 hours have been reviewed.  ABG    Recent Labs  Lab 05/31/17  1309   PH 7.340*   PO2  133*   PCO2 48.4*   HCO3 26.1   BE 0         Chest X-Ray:   Film and report reviewed:      ASSESSMENT/PLAN:     Problem   On Mechanically Assisted Ventilation   Aortic Stenosis, Severe   Acute Hyperglycemia   S/P Avr (Aortic Valve Replacement)   Essential Hypertension   Cocaine Abuse   Alcohol Use   Tobacco Use       1. Neuro: sedation for RASS goal -2 until meets criteria for weaning; monitor for ETOH withdrawal post extubation  2. Pulmonary: full vent support with weaning per protocol once meets criteria; smoking cessation counseling post once extubated and recovered from anesthesia  3. Cardiac: titrate levophed to maintain MAP > 65; CVT and cardiology following  4. Renal: accurate I/O; monitor creatinine  5. Infectious Disease: sepsis surveillance  6. Hematology/Oncology: monitor bleeding; transfuse per CVT  7. Endocrine: insulin infusion with hourly monitoring and adjustments for glucose control and prevention of insulin toxicity  8. Fluids/Electrolytes/Nutrition/GI: iv hydration; monitor electrolytes  9. Musculoskeletal: ROM, OOB with assist and sternal precautions post extubation  10. Pain Management: prn analgesia  11. Discharge and Palliative Care: anticipate home on discharge    Preventive Measures:   Nutrition: Goal: Tolerate oral diet and meet caloric needs  Stress Ulcer: Pepcid  DVT: LMWH/SCDs  BB: contraindicated post valve replacement  Bowel Prophylaxis: docusate  Head of Bed/Reposition: Elevate HOB and turn Q1-2 hours    Physical Therapy: consult once stable.     Sedation Interruption: per protocol   Vent Day:1  OG Day: 1  Central Line Day: 1  Arterial Line Day: 1  Kelsey Day: 1  IVAB Day: 1 of 2 for post CTS prophylaxis  Pneumonia Vaccine: ordered  Surrogate Decision Maker: brother  Code Status: full    Counseling/Consultation:I have discussed the care of this patient in detail with the nursing staff and Dr. Young and Dr Hernandez.     Critical Care Time 58 minutes  Critical care was time spent  personally by me on the following activities: development of treatment plan with patient or surrogate and bedside caregivers, discussions with consultants, evaluation of patient's response to treatment, examination of patient, ordering and performing treatments and interventions, ordering and review of laboratory studies, ordering and review of radiographic studies, pulse oximetry, and re-evaluation of patient's condition.  This critical care time did not overlap with that of any other provider.    Thank you Dr. Hernandez for this consult and the pleasure of evaluating and caring for this patient    Temitope Sloan [facility]

## 2017-05-31 NOTE — PROGRESS NOTES
Pt transported to ICU bed 10 by surgery team.  8.0 ETT noted, 23 cm at the lips.  OG tube noted as well.  RIJ double lumen cordis noted with swan-medardo catheter, external length measuring at 52 cm, no air in balloon.  CT x 2 noted.  350 cc of bright red blood with clots in collection chamber.  Dr. Hernandez at bedside and aware.  Orders for an additional unit of ffp and one pack of platelets.  Noted.  Will continue to monitor.  Pts sbp in the 70s.  Orders to start levo.  Noted.

## 2017-06-01 PROBLEM — Z99.11 ON MECHANICALLY ASSISTED VENTILATION: Status: RESOLVED | Noted: 2017-05-31 | Resolved: 2017-06-01

## 2017-06-01 LAB
ANION GAP SERPL CALC-SCNC: 7 MMOL/L
BASOPHILS # BLD AUTO: 0.02 K/UL
BASOPHILS # BLD AUTO: ABNORMAL K/UL
BASOPHILS NFR BLD: 0.1 %
BASOPHILS NFR BLD: 1 %
BLD PROD TYP BPU: NORMAL
BLOOD UNIT EXPIRATION DATE: NORMAL
BLOOD UNIT TYPE CODE: 9500
BLOOD UNIT TYPE: NORMAL
BUN SERPL-MCNC: 13 MG/DL
CALCIUM SERPL-MCNC: 7.9 MG/DL
CHLORIDE SERPL-SCNC: 105 MMOL/L
CO2 SERPL-SCNC: 25 MMOL/L
CODING SYSTEM: NORMAL
CREAT SERPL-MCNC: 0.8 MG/DL
DIFFERENTIAL METHOD: ABNORMAL
DIFFERENTIAL METHOD: ABNORMAL
DISPENSE STATUS: NORMAL
EOSINOPHIL # BLD AUTO: 0 K/UL
EOSINOPHIL # BLD AUTO: ABNORMAL K/UL
EOSINOPHIL NFR BLD: 0 %
EOSINOPHIL NFR BLD: 0 %
ERYTHROCYTE [DISTWIDTH] IN BLOOD BY AUTOMATED COUNT: 13.2 %
ERYTHROCYTE [DISTWIDTH] IN BLOOD BY AUTOMATED COUNT: 13.9 %
EST. GFR  (AFRICAN AMERICAN): >60 ML/MIN/1.73 M^2
EST. GFR  (NON AFRICAN AMERICAN): >60 ML/MIN/1.73 M^2
GLUCOSE SERPL-MCNC: 113 MG/DL
HCT VFR BLD AUTO: 19.6 %
HCT VFR BLD AUTO: 24.2 %
HGB BLD-MCNC: 7.1 G/DL
HGB BLD-MCNC: 8.4 G/DL
INR PPP: 1.2
LYMPHOCYTES # BLD AUTO: 2.4 K/UL
LYMPHOCYTES # BLD AUTO: ABNORMAL K/UL
LYMPHOCYTES NFR BLD: 12.7 %
LYMPHOCYTES NFR BLD: 13 %
MCH RBC QN AUTO: 30.8 PG
MCH RBC QN AUTO: 32.7 PG
MCHC RBC AUTO-ENTMCNC: 34.7 %
MCHC RBC AUTO-ENTMCNC: 36.2 %
MCV RBC AUTO: 89 FL
MCV RBC AUTO: 90 FL
METAMYELOCYTES NFR BLD MANUAL: 1 %
MONOCYTES # BLD AUTO: 2.3 K/UL
MONOCYTES # BLD AUTO: ABNORMAL K/UL
MONOCYTES NFR BLD: 12.1 %
MONOCYTES NFR BLD: 5 %
NEUTROPHILS # BLD AUTO: 14.5 K/UL
NEUTROPHILS # BLD AUTO: ABNORMAL K/UL
NEUTROPHILS NFR BLD: 75.6 %
NEUTROPHILS NFR BLD: 78 %
NEUTS BAND NFR BLD MANUAL: 2 %
NUM UNITS TRANS PACKED RBC: NORMAL
OVALOCYTES BLD QL SMEAR: ABNORMAL
PLATELET # BLD AUTO: 131 K/UL
PLATELET # BLD AUTO: 148 K/UL
PLATELET BLD QL SMEAR: ABNORMAL
PMV BLD AUTO: 9 FL
PMV BLD AUTO: 9.3 FL
POC ACTIVATED CLOTTING TIME K: >1000 SEC (ref 74–137)
POCT GLUCOSE: 104 MG/DL (ref 70–110)
POCT GLUCOSE: 123 MG/DL (ref 70–110)
POCT GLUCOSE: 123 MG/DL (ref 70–110)
POCT GLUCOSE: 129 MG/DL (ref 70–110)
POCT GLUCOSE: 131 MG/DL (ref 70–110)
POCT GLUCOSE: 132 MG/DL (ref 70–110)
POCT GLUCOSE: 132 MG/DL (ref 70–110)
POCT GLUCOSE: 133 MG/DL (ref 70–110)
POCT GLUCOSE: 136 MG/DL (ref 70–110)
POCT GLUCOSE: 141 MG/DL (ref 70–110)
POCT GLUCOSE: 143 MG/DL (ref 70–110)
POCT GLUCOSE: 144 MG/DL (ref 70–110)
POCT GLUCOSE: 146 MG/DL (ref 70–110)
POCT GLUCOSE: 148 MG/DL (ref 70–110)
POCT GLUCOSE: 151 MG/DL (ref 70–110)
POCT GLUCOSE: 154 MG/DL (ref 70–110)
POCT GLUCOSE: 155 MG/DL (ref 70–110)
POCT GLUCOSE: 160 MG/DL (ref 70–110)
POCT GLUCOSE: 178 MG/DL (ref 70–110)
POCT GLUCOSE: 179 MG/DL (ref 70–110)
POCT GLUCOSE: 179 MG/DL (ref 70–110)
POCT GLUCOSE: 183 MG/DL (ref 70–110)
POCT GLUCOSE: 200 MG/DL (ref 70–110)
POCT GLUCOSE: 88 MG/DL (ref 70–110)
POIKILOCYTOSIS BLD QL SMEAR: SLIGHT
POTASSIUM SERPL-SCNC: 4.1 MMOL/L
PROTHROMBIN TIME: 12.3 SEC
RBC # BLD AUTO: 2.17 M/UL
RBC # BLD AUTO: 2.73 M/UL
SAMPLE: ABNORMAL
SODIUM SERPL-SCNC: 137 MMOL/L
SPHEROCYTES BLD QL SMEAR: ABNORMAL
STOMATOCYTES BLD QL SMEAR: PRESENT
WBC # BLD AUTO: 14.3 K/UL
WBC # BLD AUTO: 19.27 K/UL

## 2017-06-01 PROCEDURE — 27000221 HC OXYGEN, UP TO 24 HOURS

## 2017-06-01 PROCEDURE — 63600175 PHARM REV CODE 636 W HCPCS: Performed by: THORACIC SURGERY (CARDIOTHORACIC VASCULAR SURGERY)

## 2017-06-01 PROCEDURE — 85007 BL SMEAR W/DIFF WBC COUNT: CPT

## 2017-06-01 PROCEDURE — 85027 COMPLETE CBC AUTOMATED: CPT

## 2017-06-01 PROCEDURE — 94799 UNLISTED PULMONARY SVC/PX: CPT

## 2017-06-01 PROCEDURE — 97162 PT EVAL MOD COMPLEX 30 MIN: CPT

## 2017-06-01 PROCEDURE — 36430 TRANSFUSION BLD/BLD COMPNT: CPT

## 2017-06-01 PROCEDURE — 25000003 PHARM REV CODE 250: Performed by: INTERNAL MEDICINE

## 2017-06-01 PROCEDURE — 25000003 PHARM REV CODE 250: Performed by: THORACIC SURGERY (CARDIOTHORACIC VASCULAR SURGERY)

## 2017-06-01 PROCEDURE — 99233 SBSQ HOSP IP/OBS HIGH 50: CPT | Mod: ,,, | Performed by: INTERNAL MEDICINE

## 2017-06-01 PROCEDURE — 85610 PROTHROMBIN TIME: CPT

## 2017-06-01 PROCEDURE — P9016 RBC LEUKOCYTES REDUCED: HCPCS

## 2017-06-01 PROCEDURE — 25000003 PHARM REV CODE 250: Performed by: PHYSICIAN ASSISTANT

## 2017-06-01 PROCEDURE — 63600175 PHARM REV CODE 636 W HCPCS: Performed by: INTERNAL MEDICINE

## 2017-06-01 PROCEDURE — 20000000 HC ICU ROOM

## 2017-06-01 PROCEDURE — 36415 COLL VENOUS BLD VENIPUNCTURE: CPT

## 2017-06-01 PROCEDURE — 80048 BASIC METABOLIC PNL TOTAL CA: CPT

## 2017-06-01 PROCEDURE — 85025 COMPLETE CBC W/AUTO DIFF WBC: CPT

## 2017-06-01 PROCEDURE — 97530 THERAPEUTIC ACTIVITIES: CPT

## 2017-06-01 PROCEDURE — 99291 CRITICAL CARE FIRST HOUR: CPT | Mod: ,,, | Performed by: NURSE PRACTITIONER

## 2017-06-01 PROCEDURE — 25000003 PHARM REV CODE 250: Performed by: NURSE PRACTITIONER

## 2017-06-01 PROCEDURE — 63600175 PHARM REV CODE 636 W HCPCS: Performed by: PHYSICIAN ASSISTANT

## 2017-06-01 RX ORDER — METOPROLOL TARTRATE 1 MG/ML
5 INJECTION, SOLUTION INTRAVENOUS EVERY 5 MIN PRN
Status: COMPLETED | OUTPATIENT
Start: 2017-06-01 | End: 2017-06-01

## 2017-06-01 RX ORDER — HYDROCODONE BITARTRATE AND ACETAMINOPHEN 500; 5 MG/1; MG/1
TABLET ORAL
Status: DISCONTINUED | OUTPATIENT
Start: 2017-06-01 | End: 2017-06-02

## 2017-06-01 RX ORDER — METOPROLOL TARTRATE 50 MG/1
50 TABLET ORAL 2 TIMES DAILY
Status: DISCONTINUED | OUTPATIENT
Start: 2017-06-01 | End: 2017-06-02

## 2017-06-01 RX ORDER — HYDROCODONE BITARTRATE AND ACETAMINOPHEN 500; 5 MG/1; MG/1
TABLET ORAL
Status: DISCONTINUED | OUTPATIENT
Start: 2017-06-01 | End: 2017-06-01

## 2017-06-01 RX ORDER — BENAZEPRIL HYDROCHLORIDE 10 MG/1
10 TABLET ORAL DAILY
Status: DISCONTINUED | OUTPATIENT
Start: 2017-06-01 | End: 2017-06-01

## 2017-06-01 RX ORDER — FOLIC ACID 1 MG/1
1 TABLET ORAL DAILY
Status: DISCONTINUED | OUTPATIENT
Start: 2017-06-01 | End: 2017-06-06 | Stop reason: HOSPADM

## 2017-06-01 RX ORDER — FAMOTIDINE 20 MG/1
20 TABLET, FILM COATED ORAL 2 TIMES DAILY
Status: DISCONTINUED | OUTPATIENT
Start: 2017-06-01 | End: 2017-06-06 | Stop reason: HOSPADM

## 2017-06-01 RX ORDER — HYDRALAZINE HYDROCHLORIDE 20 MG/ML
20 INJECTION INTRAMUSCULAR; INTRAVENOUS EVERY 6 HOURS PRN
Status: DISCONTINUED | OUTPATIENT
Start: 2017-06-01 | End: 2017-06-06 | Stop reason: HOSPADM

## 2017-06-01 RX ADMIN — NICARDIPINE HYDROCHLORIDE 15 MG/HR: 0.2 INJECTION, SOLUTION INTRAVENOUS at 04:06

## 2017-06-01 RX ADMIN — METOPROLOL TARTRATE 50 MG: 50 TABLET ORAL at 09:06

## 2017-06-01 RX ADMIN — NICARDIPINE HYDROCHLORIDE 15 MG/HR: 0.2 INJECTION, SOLUTION INTRAVENOUS at 02:06

## 2017-06-01 RX ADMIN — ASPIRIN 81 MG: 81 TABLET, COATED ORAL at 09:06

## 2017-06-01 RX ADMIN — Medication 100 MG: at 09:06

## 2017-06-01 RX ADMIN — MORPHINE SULFATE 2 MG: 2 INJECTION, SOLUTION INTRAMUSCULAR; INTRAVENOUS at 12:06

## 2017-06-01 RX ADMIN — HYDROCODONE BITARTRATE AND ACETAMINOPHEN 1 TABLET: 10; 325 TABLET ORAL at 09:06

## 2017-06-01 RX ADMIN — SODIUM CHLORIDE 11.5 UNITS/HR: 9 INJECTION, SOLUTION INTRAVENOUS at 03:06

## 2017-06-01 RX ADMIN — HYDROCODONE BITARTRATE AND ACETAMINOPHEN 1 TABLET: 10; 325 TABLET ORAL at 02:06

## 2017-06-01 RX ADMIN — MUPIROCIN 1 G: 20 OINTMENT TOPICAL at 08:06

## 2017-06-01 RX ADMIN — METOPROLOL TARTRATE 5 MG: 5 INJECTION INTRAVENOUS at 10:06

## 2017-06-01 RX ADMIN — METOPROLOL TARTRATE 50 MG: 50 TABLET ORAL at 08:06

## 2017-06-01 RX ADMIN — FOLIC ACID 1 MG: 1 TABLET ORAL at 10:06

## 2017-06-01 RX ADMIN — THERA TABS 1 TABLET: TAB at 09:06

## 2017-06-01 RX ADMIN — HYDROCODONE BITARTRATE AND ACETAMINOPHEN 1 TABLET: 10; 325 TABLET ORAL at 03:06

## 2017-06-01 RX ADMIN — ENOXAPARIN SODIUM 40 MG: 100 INJECTION SUBCUTANEOUS at 04:06

## 2017-06-01 RX ADMIN — AMIODARONE HYDROCHLORIDE 1 MG/MIN: 1.8 INJECTION, SOLUTION INTRAVENOUS at 11:06

## 2017-06-01 RX ADMIN — DOCUSATE SODIUM 100 MG: 100 CAPSULE, LIQUID FILLED ORAL at 09:06

## 2017-06-01 RX ADMIN — FAMOTIDINE 20 MG: 20 TABLET ORAL at 08:06

## 2017-06-01 RX ADMIN — POTASSIUM CHLORIDE 40 MEQ: 1500 TABLET, EXTENDED RELEASE ORAL at 09:06

## 2017-06-01 RX ADMIN — AMIODARONE HYDROCHLORIDE 150 MG: 1.5 INJECTION, SOLUTION INTRAVENOUS at 11:06

## 2017-06-01 RX ADMIN — CLOPIDOGREL BISULFATE 75 MG: 75 TABLET ORAL at 09:06

## 2017-06-01 RX ADMIN — FAMOTIDINE 20 MG: 20 TABLET ORAL at 09:06

## 2017-06-01 RX ADMIN — FUROSEMIDE 40 MG: 10 INJECTION, SOLUTION INTRAMUSCULAR; INTRAVENOUS at 09:06

## 2017-06-01 RX ADMIN — NICOTINE 1 PATCH: 21 PATCH, EXTENDED RELEASE TRANSDERMAL at 09:06

## 2017-06-01 RX ADMIN — MORPHINE SULFATE 2 MG: 2 INJECTION, SOLUTION INTRAMUSCULAR; INTRAVENOUS at 04:06

## 2017-06-01 RX ADMIN — BENAZEPRIL HYDROCHLORIDE 10 MG: 10 TABLET, FILM COATED ORAL at 09:06

## 2017-06-01 RX ADMIN — MORPHINE SULFATE 2 MG: 2 INJECTION, SOLUTION INTRAMUSCULAR; INTRAVENOUS at 02:06

## 2017-06-01 RX ADMIN — NICARDIPINE HYDROCHLORIDE 15 MG/HR: 0.2 INJECTION, SOLUTION INTRAVENOUS at 10:06

## 2017-06-01 RX ADMIN — POTASSIUM CHLORIDE 40 MEQ: 1500 TABLET, EXTENDED RELEASE ORAL at 08:06

## 2017-06-01 RX ADMIN — MORPHINE SULFATE 2 MG: 2 INJECTION, SOLUTION INTRAMUSCULAR; INTRAVENOUS at 07:06

## 2017-06-01 RX ADMIN — CEFAZOLIN SODIUM 2 G: 2 SOLUTION INTRAVENOUS at 02:06

## 2017-06-01 RX ADMIN — MUPIROCIN 1 G: 20 OINTMENT TOPICAL at 09:06

## 2017-06-01 RX ADMIN — NICARDIPINE HYDROCHLORIDE 15 MG/HR: 0.2 INJECTION, SOLUTION INTRAVENOUS at 06:06

## 2017-06-01 RX ADMIN — SODIUM CHLORIDE 6 UNITS/HR: 9 INJECTION, SOLUTION INTRAVENOUS at 02:06

## 2017-06-01 RX ADMIN — DOCUSATE SODIUM 100 MG: 100 CAPSULE, LIQUID FILLED ORAL at 08:06

## 2017-06-01 RX ADMIN — FUROSEMIDE 40 MG: 10 INJECTION, SOLUTION INTRAMUSCULAR; INTRAVENOUS at 08:06

## 2017-06-01 NOTE — PROGRESS NOTES
S/P AVR tissue (POD#1)    Pt seen and examined, doing well, OOB to chair.    H&H noted. Being transfused 2units of pRBC's    Plan:  1. Keep in ICU  2. Check CBC after transfusion.  3. Keep CT's in place.  4. Continue routine postop therapy.

## 2017-06-01 NOTE — ASSESSMENT & PLAN NOTE
S/p tissue valve replacement  Per Primary Team and Cardiology recommendations    6/1/17: s/p tissue valve replacement overall doing well just sore

## 2017-06-01 NOTE — PROGRESS NOTES
Progress Note  Critical Care    Admit Date: 5/31/2017   LOS: 1 day     Follow-up For: Post Op Care     SUBJECTIVE:     New over last 24 hours: extubated overnight and up in chair this AM.  Still on Cardene infusion.     ROS  Review of Systems   Constitutional: Negative for chills, fever and malaise/fatigue.   HENT: Positive for sore throat. Negative for congestion.    Eyes: Negative for blurred vision.   Respiratory: Negative for cough, sputum production and shortness of breath.    Cardiovascular: Positive for chest pain (post op). Negative for palpitations and leg swelling.   Gastrointestinal: Negative for abdominal pain, nausea and vomiting.   Genitourinary: Negative for dysuria.   Musculoskeletal: Negative for myalgias.   Skin: Negative for rash.   Neurological: Positive for weakness and headaches. Negative for dizziness and focal weakness.   Endo/Heme/Allergies: Does not bruise/bleed easily.   Psychiatric/Behavioral: The patient is not nervous/anxious.        Continuous Infusions:   dexmedetomidine (PRECEDEX) infusion Stopped (05/31/17 1720)    insulin (HUMAN R) infusion (adults) 8.5 Units/hr (06/01/17 0719)    niCARdipine 15 mg/hr (06/01/17 0648)    norepinephrine bitartrate-D5W Stopped (05/31/17 1905)     Review of patient's allergies indicates:  No Known Allergies    OBJECTIVE:     Vital Signs (Most Recent)  Temp: 100 °F (37.8 °C) (06/01/17 0635)  Pulse: 73 (06/01/17 0650)  Resp: (!) 22 (06/01/17 0650)  BP: (!) 108/39 (06/01/17 0605)  SpO2: 96 % (06/01/17 0650)    Vital Signs Range (Last 24H):  Temp:  [97.4 °F (36.3 °C)-100 °F (37.8 °C)]   Pulse:  [59-81]   Resp:  [15-24]   BP: ()/(20-60)   SpO2:  [92 %-100 %]   Arterial Line BP: ()/(30-56)     I & O (Last 24H):  Intake/Output Summary (Last 24 hours) at 06/01/17 0741  Last data filed at 06/01/17 0650   Gross per 24 hour   Intake          5616.59 ml   Output             3833 ml   Net          1783.59 ml       Ventilator Data (Last 24H):      Vent Mode: Spont  Oxygen Concentration (%):  [50] 50  Resp Rate Total:  [16 br/min-23 br/min] 23 br/min  Vt Set:  [500 mL-550 mL] 550 mL  PEEP/CPAP:  [5 cmH20] 5 cmH20  Pressure Support:  [10 cmH20] 10 cmH20  Mean Airway Pressure:  [8 cmH20-9.8 cmH20] 8 cmH20    Physical Exam:    Physical Exam   Constitutional: He is oriented to person, place, and time and well-developed, well-nourished, and in no distress. He appears not lethargic, to not be writhing in pain and not malnourished. He appears healthy.  Non-toxic appearance. He does not have a sickly appearance. No distress. He is not intubated.   HENT:   Head: Normocephalic and atraumatic.   Mouth/Throat: Oropharynx is clear and moist.   Eyes: EOM and lids are normal. Pupils are equal, round, and reactive to light.   Neck: Normal range of motion. Carotid bruit is not present.       Cardiovascular: Normal rate and regular rhythm.    Pulses:       Radial pulses are 2+ on the right side, and 2+ on the left side.        Dorsalis pedis pulses are 1+ on the right side, and 1+ on the left side.   Pulmonary/Chest: No accessory muscle usage. He is not intubated. No respiratory distress. He has decreased breath sounds.   Abdominal: Soft. He exhibits no distension. Bowel sounds are hypoactive. There is no tenderness.   Genitourinary: Penis normal.   Genitourinary Comments: Kelsey    Musculoskeletal: Normal range of motion.   Trace dependent pedal edema   Lymphadenopathy:     He has no cervical adenopathy.   Neurological: He is alert and oriented to person, place, and time. He appears not lethargic.   Skin: Skin is warm and dry. He is not diaphoretic. No cyanosis.        Psychiatric: Mood, memory, affect and judgment normal.       Laboratory (Last 24H):  CBC:    Recent Labs  Lab 06/01/17  0352   WBC 14.30*   HGB 7.1*   HCT 19.6*   *     CMP:    Recent Labs  Lab 06/01/17  0352   CALCIUM 7.9*      K 4.1   CO2 25      BUN 13   CREATININE 0.8       Coagulation:    Recent Labs  Lab 05/31/17  1904 06/01/17  0345   INR 1.2 1.2   APTT 30.6  --      ABGs:   Recent Labs  Lab 05/31/17  1309   PH 7.340*   PCO2 48.4*   HCO3 26.1   POCSATURATED 99   BE 0       Chest X-Ray:  Film and report reviewed:  No change from yesterday film      ASSESSMENT/PLAN:     Problem   Aortic Stenosis, Severe   S/P Avr (Aortic Valve Replacement)   Acute Blood Loss Anemia   Essential Hypertension   Acute Hyperglycemia   Paroxysmal Atrial Fibrillation   Cocaine Abuse   Alcohol Use   Tobacco Use   On Mechanically Assisted Ventilation (Resolved)       PLAN:    1. Neuro: ICU neuro monitoring    2. Pulmonary: Encourage OOB, C&DB and IS use.  Encouraged tobacco cessation as well as cessation of cocaine and THC.  Nicotine patch    3. Cardiac: ICU Cardiac monitoring with CVT and Cards following.  Will increase BP meds per Cards recs and wean Cardene infusion.  Cont ASA, Plavix, Lasix and Lopressor for now.     4. Renal: Kelsey in place, monitor I/Os     5. Infectious Disease: Follow fever curve, completed post op Ancef and Vanc    6. Hematology/Oncology: monitor for bleeding.  Transfuse 2 units PRBCs this AM.  CVT monitoring chest tube output.      7. Endocrine:  Monitor glucose and change insulin infusion to SSI later today.     8. Fluids/Electrolytes/Nutrition/GI: advance diet as tolerated and cont thiamine and MVI.  Encouraged etoh cessation.  Monitor electrolytes and replace as needed.     9. Musculoskeletal:  ROM and OOB    10. Pain Management: no issues     11. Discharge and Palliative Care: Plan home with family    Preventive Measures and Monitoring:   Stress Ulcer: Pepcid  Nutrition: Cardiac diet  Glucose control: insulin infusion  Bowel prophylaxis: Colace  DVT prophylaxis: LMWH/SCDs  Hx CAD on B-Blocker:  Head of Bed/Reposition: Elevate HOB and turn Q1-2 hours    Early Mobility: OOB now  Central Line Right IJ PA Day: #2  Right Radial Arterial Line Day: #2  Chest tubes X 2 Day: #2  Kelsey Day: #2  IVAB  Day: #2  Code Status: Full    Counseling/Consultation: I have discussed the care of this patient in detail with Multidisciplinary team during rounds, bedside nursing staff and Dr. Young and Dr. Hernandez    Critical Care Time greater than: 49 minutes    Critical care was time spent personally by me on the following activities: development of treatment plan with patient or surrogate and bedside caregivers, discussions with consultants, evaluation of patient's response to treatment, examination of patient, ordering and performing treatments and interventions, ordering and review of laboratory studies, ordering and review of radiographic studies, pulse oximetry, re-evaluation of patient's condition.  This critical care time did not overlap with that of any other provider or involve time for any procedures.    VINCE Pedersen [facility]

## 2017-06-01 NOTE — PT/OT/SLP EVAL
Physical Therapy  Evaluation    Sukhjinder Kim   MRN: [id number]   Admitting Diagnosis: Uncontrolled hypertension    PT Received On: 06/01/17  PT Start Time: 0815     PT Stop Time: 0840    PT Total Time (min): 25 min       Billable Minutes:  Evaluation 15 and Therapeutic Activity 10    Diagnosis: Uncontrolled hypertension  P.T. TX DX: IMPAIRED FUNCTIONAL MOBILITY    Past Medical History:   Diagnosis Date    Hypertension     S/P AVR (aortic valve replacement) 5/31/2017    Tobacco abuse       Past Surgical History:   Procedure Laterality Date    APPENDECTOMY       Referring physician: DR. SHEPHERD  Date referred to PT: 5-31-17    General Precautions: Standard, fall, respiratory, sternal  Orthopedic Precautions: N/A   Braces: N/A       Patient History:  Lives With: alone  Living Arrangements: house (2 STORY HOUSE, BEDROOM/BATHROOM ON 1ST FLOOR)  Home Accessibility: stairs to enter home  Home Layout: Able to live on 1st floor  Number of Stairs to Enter Home: 3  Stair Railings at Home: outside, present at both sides  Transportation Available: car, family or friend will provide  Living Environment Comment: PT LIVES ALONE BUT HIS BROTHERS AND SISTERS WILL BE THERE TO ASSIST PT AFTER D/C FOR RECOVERY AS NEEDED, PT FUNCTIONALLY INDEP PTA  Equipment Currently Used at Home: none  DME owned (not currently used): none    Previous Level of Function:  Ambulation Skills: independent (AMB INDEP COMMUNITY DISTANCES)  Transfer Skills: independent  ADL Skills: independent  Work/Leisure Activity: independent (WORKS FULL TIME)    Subjective:  Communicated with NURSE PORTILLO prior to session  Pain/Comfort  Pain Rating 1: 10/10  Location 1: sternal  Pain Addressed 1: Nurse notified    Objective:   Patient found with: arterial line, blood pressure cuff, central line, chest tube, pulse ox (continuous), telemetry, oxygen.  PT FOUND SEATED IN CHAIR UPON ARRIVAL, GETTING BLOOD TRANSFUSION     Cognitive Exam:  Oriented to: Person, Place, Time and  Situation    Follows Commands/attention: Follows one-step commands  Communication: clear/fluent  Safety awareness/insight to disability: impaired    Physical Exam:  Postural examination/scapula alignment: No postural abnormalities identified    Skin integrity: Visible skin intact  Edema: Mild BLE    Sensation:   Intact    Lower Extremity Range of Motion:  Right Lower Extremity: WFL  Left Lower Extremity: WFL    Lower Extremity Strength:  Right Lower Extremity: GROSSLY 5/5  Left Lower Extremity: GROSSLY 5/5    Functional Mobility:  Bed Mobility:  Scooting/Bridging: Contact Guard Assistance  Supine to Sit: Contact Guard Assistance    Transfers:  Sit <> Stand Assistance: Minimum Assistance  Sit <> Stand Assistive Device: No Assistive Device    Gait:   Gait Distance: LIMITED BY ICU LINES    Balance:   Static Sit: GOOD  Dynamic Sit: FAIR  Static Stand: FAIR  Dynamic stand:     Therapeutic Activities and Exercises:  PT EDUCATED IN STERNAL PRECAUTIONS, HANDOUT ISSUED FOR HOME USE.  PT EDUCATED IN BLE THEREX TO PERFORM WHILE SEATED IN CHAIR    AM-PAC 6 CLICK MOBILITY  How much help from another person does this patient currently need?   1 = Unable, Total/Dependent Assistance  2 = A lot, Maximum/Moderate Assistance  3 = A little, Minimum/Contact Guard/Supervision  4 = None, Modified [place]/Independent    Turning over in bed (including adjusting bedclothes, sheets and blankets)?: 1 (NT)  Sitting down on and standing up from a chair with arms (e.g., wheelchair, bedside commode, etc.): 3  Moving from lying on back to sitting on the side of the bed?: 1 (NT)  Moving to and from a bed to a chair (including a wheelchair)?: 1 (NT)  Need to walk in hospital room?: 1 (NT)  Climbing 3-5 steps with a railing?: 1 (NT)  Total Score: 8     AM-PAC Raw Score CMS G-Code Modifier Level of Impairment Assistance   6 % Total / Unable   7 - 9 CM 80 - 100% Maximal Assist   10 - 14 CL 60 - 80% Moderate Assist   15 - 19 CK 40 - 60%  Moderate Assist   20 - 22 CJ 20 - 40% Minimal Assist   23 CI 1-20% SBA / CGA   24 CH 0% Independent/ Mod I     Patient left up in chair with all lines intact, call button in reach and NURSE notified.    Assessment:   Sukhjinder Kim is a 56 y.o. male with a medical diagnosis of Uncontrolled hypertension and presents with IMPAIRED FUNCTIONAL MOBILITY. PT WILL BENEFIT FROM CONT. SKILLED P.T. TO ADDRESS IMPAIRMENTS    Rehab identified problem list/impairments: Rehab identified problem list/impairments: impaired endurance, impaired functional mobilty, impaired balance, decreased safety awareness, pain    Rehab potential is good.    Activity tolerance: Good    Discharge recommendations: Discharge Facility/Level Of Care Needs: home health PT     Barriers to discharge: Barriers to Discharge: None    Equipment recommendations: Equipment Needed After Discharge: bath bench     GOALS:    Physical Therapy Goals        Problem: Physical Therapy Goal    Goal Priority Disciplines Outcome Goal Variances Interventions   Physical Therapy Goal     PT/OT, PT      Description:  LTG'S TO BE MET IN 7 DAYS (6-8-17)  1. PT WILL REQUIRE SBA FOR BED MOBILITY  2. PT WILL REQUIRE SPV FOR TF'S  3. PT WILL ' WITH SPV  4. PT WILL DEMO G DYNAMIC BALANCE DURING GAIT  5. PT WILL TOLERATE BLE THEREX X 20 REPS AROM                   PLAN:    Patient to be seen 5 x/week to address the above listed problems via gait training, therapeutic activities, therapeutic exercises  Plan of Care expires: 06/08/17  Plan of Care reviewed with: patient    PT ENCOURAGED TO CALL FOR ASSISTANCE WITH ALL NEEDS DUE TO FALL RISK STATUS, PT AGREEABLE.  PT ENCOURAGED TO INCREASE TIME OOB IN CHAIR, ALL MEALS IN CHAIR OOB, PT AGREEABLE    Tori Zavaleta, PT  06/01/2017

## 2017-06-01 NOTE — SUBJECTIVE & OBJECTIVE
Interval History:55 yo male s/p AVR. Patient is complaining of pain of chest where he had surgery.    Review of Systems   Constitutional: Negative.    HENT: Negative.    Cardiovascular: Positive for chest pain.   Gastrointestinal: Negative.    Genitourinary: Negative.    Musculoskeletal: Negative.    All other systems reviewed and are negative.    Objective:     Vital Signs (Most Recent):  Temp: 99.8 °F (37.7 °C) (06/01/17 1505)  Pulse: 64 (06/01/17 1505)  Resp: 18 (06/01/17 1505)  BP: (!) 131/49 (06/01/17 1505)  SpO2: 98 % (06/01/17 1505) Vital Signs (24h Range):  Temp:  [98.4 °F (36.9 °C)-100.4 °F (38 °C)] 99.8 °F (37.7 °C)  Pulse:  [59-81] 64  Resp:  [15-24] 18  SpO2:  [92 %-100 %] 98 %  BP: ()/(20-59) 131/49  Arterial Line BP: ()/(36-56) 131/49     Weight: 114.4 kg (252 lb 3.3 oz)  Body mass index is 37.24 kg/m².    Intake/Output Summary (Last 24 hours) at 06/01/17 1600  Last data filed at 06/01/17 1500   Gross per 24 hour   Intake          2488.58 ml   Output             3180 ml   Net          -691.42 ml      Physical Exam   Constitutional: He is oriented to person, place, and time. He appears well-developed and well-nourished.   HENT:   Head: Atraumatic.   Nose: Nose normal.   Mouth/Throat: Oropharynx is clear and moist.   Eyes: Conjunctivae and EOM are normal. Pupils are equal, round, and reactive to light.   Neck: Normal range of motion. Neck supple.   Cardiovascular: Normal rate, regular rhythm, normal heart sounds and intact distal pulses.    Pulmonary/Chest: Effort normal and breath sounds normal.   Chest wall surgical scar   Abdominal: Soft. Bowel sounds are normal.   Neurological: He is alert and oriented to person, place, and time.   Skin: Skin is warm and dry. Capillary refill takes less than 2 seconds.   Psychiatric: He has a normal mood and affect.   Nursing note and vitals reviewed.      Significant Labs:   Recent Lab Results       06/01/17  1443 06/01/17  1355 06/01/17  1227  06/01/17  1151 06/01/17  1113      Anion Gap          aPTT          BANDS          Baso # 0.02         Basophil% 0.1         BUN, Bld          Calcium          Chloride          CO2          Creatinine          Differential Method Automated         eGFR if           eGFR if non           Eos # 0.0         Eosinophil% 0.0         Glucose          Gran # 14.5(H)         Gran% 75.6(H)         Hematocrit 24.2(L)         Hemoglobin 8.4(L)         Coumadin Monitoring INR          Lymph # 2.4         Lymph% 12.7(L)         Magnesium          MCH 30.8         MCHC 34.7         MCV 89         Metamyelocytes          Mono # 2.3(H)         Mono% 12.1         MPV 9.3         Ovalocytes          Platelet Estimate          Platelets 131(L)         POCT Glucose  183(H) 179(H) 154(H) 160(H)     Poik          Potassium          Protime          RBC 2.73(L)         RDW 13.9         Sodium          Spherocytes          Stomatocytes          WBC 19.27(H)                     06/01/17  0928 06/01/17  0852 06/01/17  0714 06/01/17  0557 06/01/17  0503      Anion Gap          aPTT          BANDS          Baso #          Basophil%          BUN, Bld          Calcium          Chloride          CO2          Creatinine          Differential Method          eGFR if           eGFR if non           Eos #          Eosinophil%          Glucose          Gran #          Gran%          Hematocrit          Hemoglobin          Coumadin Monitoring INR          Lymph #          Lymph%          Magnesium          MCH          MCHC          MCV          Metamyelocytes          Mono #          Mono%          MPV          Ovalocytes          Platelet Estimate          Platelets          POCT Glucose 200(H) 179(H) 148(H) 144(H) 143(H)     Poik          Potassium          Protime          RBC          RDW          Sodium          Spherocytes          Stomatocytes          WBC                       06/01/17  0359 06/01/17  0352 06/01/17  0345 06/01/17  0306 06/01/17  0208      Anion Gap  7(L)        aPTT          BANDS  2.0        Baso #  CANCELED  Comment:  Result canceled by the ancillary        Basophil%  1.0        BUN, Bld  13        Calcium  7.9(L)        Chloride  105        CO2  25        Creatinine  0.8        Differential Method  Manual        eGFR if   >60        eGFR if non   >60  Comment:  Calculation used to obtain the estimated glomerular filtration  rate (eGFR) is the CKD-EPI equation. Since race is unknown   in our information system, the eGFR values for   -American and Non--American patients are given   for each creatinine result.          Eos #  CANCELED  Comment:  Result canceled by the ancillary        Eosinophil%  0.0        Glucose  113(H)        Gran #  CANCELED  Comment:  Result canceled by the ancillary        Gran%  78.0(H)        Hematocrit  19.6  Comment:  HCT critical result(s) called and verbal readback obtained from   Geno Sheets RN, 06/01/2017 04:23  (LL)        Hemoglobin  7.1(L)        Coumadin Monitoring INR   1.2  Comment:  Coumadin Therapy:  2.0 - 3.0 for INR for all indicators except mechanical heart valves  and antiphospholipid syndromes which should use 2.5 - 3.5.         Lymph #  CANCELED  Comment:  Result canceled by the ancillary        Lymph%  13.0(L)        Magnesium          MCH  32.7(H)        MCHC  36.2(H)        MCV  90        Metamyelocytes  1.0        Mono #  CANCELED  Comment:  Result canceled by the ancillary        Mono%  5.0        MPV  9.0(L)        Ovalocytes  Occasional        Platelet Estimate  Decreased(A)        Platelets  148(L)        POCT Glucose 104   132(H) 131(H)     Poik  Slight        Potassium  4.1        Protime   12.3       RBC  2.17(L)        RDW  13.2        Sodium  137        Spherocytes  Occasional        Stomatocytes  Present        WBC  14.30(H)                    06/01/17  0106  05/31/17  2356 05/31/17  2305 05/31/17  2156 05/31/17  2104      Anion Gap          aPTT          BANDS          Baso #          Basophil%          BUN, Bld          Calcium          Chloride          CO2          Creatinine          Differential Method          eGFR if           eGFR if non           Eos #          Eosinophil%          Glucose          Gran #          Gran%          Hematocrit          Hemoglobin          Coumadin Monitoring INR          Lymph #          Lymph%          Magnesium          MCH          MCHC          MCV          Metamyelocytes          Mono #          Mono%          MPV          Ovalocytes          Platelet Estimate          Platelets          POCT Glucose 136(H) 119(H) 129(H) 132(H) 137(H)     Poik          Potassium          Protime          RBC          RDW          Sodium          Spherocytes          Stomatocytes          WBC                      05/31/17  1953 05/31/17  1904 05/31/17  1836 05/31/17  1704      Anion Gap  7(L)       aPTT  30.6  Comment:  aPTT therapeutic range = 39-69 seconds       BANDS         Baso #  0.02       Basophil%  0.2       BUN, Bld  13       Calcium  8.1(L)       Chloride  107       CO2  26       Creatinine  0.9       Differential Method  Automated       eGFR if   >60       eGFR if non   >60  Comment:  Calculation used to obtain the estimated glomerular filtration  rate (eGFR) is the CKD-EPI equation. Since race is unknown   in our information system, the eGFR values for   -American and Non--American patients are given   for each creatinine result.         Eos #  0.0       Eosinophil%  0.0       Glucose  125(H)       Gran #  10.1(H)       Gran%  83.0(H)       Hematocrit  22.8(L)       Hemoglobin  8.2(L)       Coumadin Monitoring INR  1.2  Comment:  Coumadin Therapy:  2.0 - 3.0 for INR for all indicators except mechanical heart valves  and antiphospholipid syndromes which  should use 2.5 - 3.5.         Lymph #  0.8(L)       Lymph%  6.4(L)       Magnesium  2.4       MCH  32.5(H)       MCHC  36.0       MCV  91       Metamyelocytes         Mono #  1.3(H)       Mono%  10.6       MPV  9.3       Ovalocytes         Platelet Estimate         Platelets  181       POCT Glucose 145(H)  134(H) 145(H)     Poik         Potassium  4.4       Protime  12.2       RBC  2.52(L)       RDW  13.1       Sodium  140       Spherocytes         Stomatocytes         WBC  12.21             Significant Imaging:   Imaging Results          X-Ray Chest AP Portable (Final result)  Result time 06/01/17 08:40:54    Final result by Lisy Wilkins MD (06/01/17 08:40:54)                 Impression:     As above.            Electronically signed by: LISY WILKINS MD  Date:     06/01/17  Time:    08:40              Narrative:    Exam: XR CHEST AP PORTABLE    Clinical History: Respiratory distress. Post-op    Findings:     Interval extubation and removal of the NGT.  Stable position of a [place]-Sudha catheter.  No pneumothorax.  Otherwise no change from prior on May 31, 2017.                             X-Ray Chest AP Portable (Final result)  Result time 05/31/17 12:19:24    Final result by Alexia Bower MD (05/31/17 12:19:24)                 Impression:     Stable postop chest. Left basilar opacity as above.      Electronically signed by: ALEXIA BOWER MD  Date:     05/31/17  Time:    12:19              Narrative:    History: Postop    Median sternotomy. Normal heart size. Endotracheal tube, NG tube, and [place]-Sudha catheter in place. Right lung is clear. There is opacity left base consistent with atelectasis and/or pleural effusion.

## 2017-06-01 NOTE — SUBJECTIVE & OBJECTIVE
Past Medical History:   Diagnosis Date    Hypertension     S/P AVR (aortic valve replacement) 5/31/2017    Tobacco abuse        Past Surgical History:   Procedure Laterality Date    APPENDECTOMY         Review of patient's allergies indicates:  No Known Allergies    No current facility-administered medications on file prior to encounter.      Current Outpatient Prescriptions on File Prior to Encounter   Medication Sig    aspirin (ECOTRIN) 81 MG EC tablet Take 1 tablet (81 mg total) by mouth once daily.    diltiaZEM (CARDIZEM CD) 240 MG 24 hr capsule Take 1 capsule (240 mg total) by mouth once daily. (Patient taking differently: Take 240 mg by mouth 2 (two) times daily. )     Family History     Problem Relation (Age of Onset)    Heart attacks under age 50 Father (42)    Valvular heart disease Father, Brother (27), Brother (62)        Social History Main Topics    Smoking status: Current Every Day Smoker     Packs/day: 0.20     Years: 40.00     Types: Cigarettes    Smokeless tobacco: Not on file      Comment: no smoking after m.n prior to surgery    Alcohol use Yes      Comment: daily - 6 pack beer  No alcohol prior to surgery    Drug use:      Types: Marijuana, Cocaine      Comment: no cocaine/marijuana prior to surgery    Sexual activity: Not on file     Review of Systems   Constitutional: Negative for chills, diaphoresis, fatigue and fever.   HENT: Negative for drooling, ear pain, rhinorrhea and sore throat.    Eyes: Negative.    Respiratory: Negative for cough, shortness of breath and wheezing.    Cardiovascular: Negative for palpitations and leg swelling.   Gastrointestinal: Negative for abdominal pain, constipation, diarrhea and nausea.   Endocrine: Negative.    Genitourinary: Negative for dysuria, hematuria and urgency.   Musculoskeletal: Positive for arthralgias.        Postoperative pain     Skin: Negative for color change and wound.   Allergic/Immunologic: Negative.    Neurological: Negative for  dizziness, syncope and speech difficulty.   Hematological: Negative.    Psychiatric/Behavioral: Negative.      Objective:     Vital Signs (Most Recent):  Temp: 99.5 °F (37.5 °C) (05/31/17 2000)  Pulse: 66 (05/31/17 2000)  Resp: (!) 21 (05/31/17 2000)  BP: (!) 101/31 (05/31/17 2000)  SpO2: 98 % (05/31/17 2000) Vital Signs (24h Range):  Temp:  [97.4 °F (36.3 °C)-99.5 °F (37.5 °C)] 99.5 °F (37.5 °C)  Pulse:  [58-81] 66  Resp:  [15-23] 21  SpO2:  [95 %-100 %] 98 %  BP: ()/(20-90) 101/31  Arterial Line BP: ()/(30-56) 109/38     Weight: 111.4 kg (245 lb 9.5 oz)  Body mass index is 36.27 kg/m².    Physical Exam   Constitutional: He is oriented to person, place, and time. He appears well-developed and well-nourished. No distress.   HENT:   Head: Normocephalic and atraumatic.   Eyes: EOM are normal.   Neck: Normal range of motion. Neck supple.   Cardiovascular: Normal rate, regular rhythm and normal heart sounds.    Pulmonary/Chest: Effort normal and breath sounds normal. No respiratory distress.   CT noted   Abdominal: Soft. Bowel sounds are normal. He exhibits no distension. There is no tenderness.   Musculoskeletal: Normal range of motion. He exhibits edema.   Neurological: He is alert and oriented to person, place, and time.   Skin: Skin is dry.   Midsternal dressing intact without drainage   Nursing note and vitals reviewed.      Significant Labs:   CBC:   Recent Labs  Lab 05/31/17  0736  05/31/17  1014 05/31/17  1125 05/31/17  1904   WBC  --   --   --  18.61* 12.21   HGB 11.9*  --   --  8.9* 8.2*   HCT 33.9*  < > 25* 24.7* 22.8*     --   --  160 181   < > = values in this interval not displayed.  CMP:   Recent Labs  Lab 05/31/17  1125 05/31/17  1904    140   K 4.6  4.6 4.4    107   CO2 26 26    125*   BUN 11 13   CREATININE 0.7 0.9   CALCIUM 7.2* 8.1*   ANIONGAP 6* 7*   EGFRNONAA >60 >60       Significant Imaging:   Imaging Results          X-Ray Chest AP Portable (Final result)   Result time 05/31/17 12:19:24    Final result by Nixno Bower MD (05/31/17 12:19:24)                 Impression:     Stable postop chest. Left basilar opacity as above.      Electronically signed by: NIXON BOWER MD  Date:     05/31/17  Time:    12:19              Narrative:    History: Postop    Median sternotomy. Normal heart size. Endotracheal tube, NG tube, and [place]-Sudha catheter in place. Right lung is clear. There is opacity left base consistent with atelectasis and/or pleural effusion.

## 2017-06-01 NOTE — PROGRESS NOTES
[facility] Hospital Medicine  Progress Note    Patient Name: Sukhjinder Kim  MRN: [id number]  Patient Class: IP- Inpatient   Admission Date: 5/31/2017  Length of Stay: 1 days  Attending Physician: Gerardo Hernandez MD  Primary Care Provider: Primary Doctor No        Subjective:     Principal Problem:Uncontrolled hypertension    HPI:  Sukhjinder Kim is a 56 year old male with history of Hypertension, Tobacco abuse, ETOH use, and Aortic Stenosis who underwent aortic valve replacement today performed by Dr. Hernandez.  Patient is currently extubated and awake requesting pain medications. Hospital Medicine has been consulted for medical management.    Hospital Course:  No notes on file    Interval History:57 yo male s/p AVR. Patient is complaining of pain of chest where he had surgery.    Review of Systems   Constitutional: Negative.    HENT: Negative.    Cardiovascular: Positive for chest pain.   Gastrointestinal: Negative.    Genitourinary: Negative.    Musculoskeletal: Negative.    All other systems reviewed and are negative.    Objective:     Vital Signs (Most Recent):  Temp: 99.8 °F (37.7 °C) (06/01/17 1505)  Pulse: 64 (06/01/17 1505)  Resp: 18 (06/01/17 1505)  BP: (!) 131/49 (06/01/17 1505)  SpO2: 98 % (06/01/17 1505) Vital Signs (24h Range):  Temp:  [98.4 °F (36.9 °C)-100.4 °F (38 °C)] 99.8 °F (37.7 °C)  Pulse:  [59-81] 64  Resp:  [15-24] 18  SpO2:  [92 %-100 %] 98 %  BP: ()/(20-59) 131/49  Arterial Line BP: ()/(36-56) 131/49     Weight: 114.4 kg (252 lb 3.3 oz)  Body mass index is 37.24 kg/m².    Intake/Output Summary (Last 24 hours) at 06/01/17 1600  Last data filed at 06/01/17 1500   Gross per 24 hour   Intake          2488.58 ml   Output             3180 ml   Net          -691.42 ml      Physical Exam   Constitutional: He is oriented to person, place, and time. He appears well-developed and well-nourished.   HENT:   Head: Atraumatic.   Nose: Nose normal.   Mouth/Throat: Oropharynx is  clear and moist.   Eyes: Conjunctivae and EOM are normal. Pupils are equal, round, and reactive to light.   Neck: Normal range of motion. Neck supple.   Cardiovascular: Normal rate, regular rhythm, normal heart sounds and intact distal pulses.    Pulmonary/Chest: Effort normal and breath sounds normal.   Chest wall surgical scar   Abdominal: Soft. Bowel sounds are normal.   Neurological: He is alert and oriented to person, place, and time.   Skin: Skin is warm and dry. Capillary refill takes less than 2 seconds.   Psychiatric: He has a normal mood and affect.   Nursing note and vitals reviewed.      Significant Labs:   Recent Lab Results       06/01/17  1443 06/01/17  1355 06/01/17  1227 06/01/17  1151 06/01/17  1113      Anion Gap          aPTT          BANDS          Baso # 0.02         Basophil% 0.1         BUN, Bld          Calcium          Chloride          CO2          Creatinine          Differential Method Automated         eGFR if           eGFR if non           Eos # 0.0         Eosinophil% 0.0         Glucose          Gran # 14.5(H)         Gran% 75.6(H)         Hematocrit 24.2(L)         Hemoglobin 8.4(L)         Coumadin Monitoring INR          Lymph # 2.4         Lymph% 12.7(L)         Magnesium          MCH 30.8         MCHC 34.7         MCV 89         Metamyelocytes          Mono # 2.3(H)         Mono% 12.1         MPV 9.3         Ovalocytes          Platelet Estimate          Platelets 131(L)         POCT Glucose  183(H) 179(H) 154(H) 160(H)     Poik          Potassium          Protime          RBC 2.73(L)         RDW 13.9         Sodium          Spherocytes          Stomatocytes          WBC 19.27(H)                     06/01/17  0928 06/01/17  0852 06/01/17  0714 06/01/17  0557 06/01/17  0503      Anion Gap          aPTT          BANDS          Baso #          Basophil%          BUN, Bld          Calcium          Chloride          CO2          Creatinine           Differential Method          eGFR if           eGFR if non           Eos #          Eosinophil%          Glucose          Gran #          Gran%          Hematocrit          Hemoglobin          Coumadin Monitoring INR          Lymph #          Lymph%          Magnesium          MCH          MCHC          MCV          Metamyelocytes          Mono #          Mono%          MPV          Ovalocytes          Platelet Estimate          Platelets          POCT Glucose 200(H) 179(H) 148(H) 144(H) 143(H)     Poik          Potassium          Protime          RBC          RDW          Sodium          Spherocytes          Stomatocytes          WBC                      06/01/17  0359 06/01/17  0352 06/01/17  0345 06/01/17  0306 06/01/17  0208      Anion Gap  7(L)        aPTT          BANDS  2.0        Baso #  CANCELED  Comment:  Result canceled by the ancillary        Basophil%  1.0        BUN, Bld  13        Calcium  7.9(L)        Chloride  105        CO2  25        Creatinine  0.8        Differential Method  Manual        eGFR if   >60        eGFR if non   >60  Comment:  Calculation used to obtain the estimated glomerular filtration  rate (eGFR) is the CKD-EPI equation. Since race is unknown   in our information system, the eGFR values for   -American and Non--American patients are given   for each creatinine result.          Eos #  CANCELED  Comment:  Result canceled by the ancillary        Eosinophil%  0.0        Glucose  113(H)        Gran #  CANCELED  Comment:  Result canceled by the ancillary        Gran%  78.0(H)        Hematocrit  19.6  Comment:  HCT critical result(s) called and verbal readback obtained from   Geno Sheets RN, 06/01/2017 04:23  (LL)        Hemoglobin  7.1(L)        Coumadin Monitoring INR   1.2  Comment:  Coumadin Therapy:  2.0 - 3.0 for INR for all indicators except mechanical heart valves  and antiphospholipid syndromes  which should use 2.5 - 3.5.         Lymph #  CANCELED  Comment:  Result canceled by the ancillary        Lymph%  13.0(L)        Magnesium          MCH  32.7(H)        MCHC  36.2(H)        MCV  90        Metamyelocytes  1.0        Mono #  CANCELED  Comment:  Result canceled by the ancillary        Mono%  5.0        MPV  9.0(L)        Ovalocytes  Occasional        Platelet Estimate  Decreased(A)        Platelets  148(L)        POCT Glucose 104   132(H) 131(H)     Poik  Slight        Potassium  4.1        Protime   12.3       RBC  2.17(L)        RDW  13.2        Sodium  137        Spherocytes  Occasional        Stomatocytes  Present        WBC  14.30(H)                    06/01/17  0106 05/31/17  2356 05/31/17  2305 05/31/17  2156 05/31/17  2104      Anion Gap          aPTT          BANDS          Baso #          Basophil%          BUN, Bld          Calcium          Chloride          CO2          Creatinine          Differential Method          eGFR if           eGFR if non           Eos #          Eosinophil%          Glucose          Gran #          Gran%          Hematocrit          Hemoglobin          Coumadin Monitoring INR          Lymph #          Lymph%          Magnesium          MCH          MCHC          MCV          Metamyelocytes          Mono #          Mono%          MPV          Ovalocytes          Platelet Estimate          Platelets          POCT Glucose 136(H) 119(H) 129(H) 132(H) 137(H)     Poik          Potassium          Protime          RBC          RDW          Sodium          Spherocytes          Stomatocytes          WBC                      05/31/17  1953 05/31/17  1904 05/31/17  1836 05/31/17  1704      Anion Gap  7(L)       aPTT  30.6  Comment:  aPTT therapeutic range = 39-69 seconds       BANDS         Baso #  0.02       Basophil%  0.2       BUN, Bld  13       Calcium  8.1(L)       Chloride  107       CO2  26       Creatinine  0.9       Differential Method   Automated       eGFR if   >60       eGFR if non   >60  Comment:  Calculation used to obtain the estimated glomerular filtration  rate (eGFR) is the CKD-EPI equation. Since race is unknown   in our information system, the eGFR values for   -American and Non--American patients are given   for each creatinine result.         Eos #  0.0       Eosinophil%  0.0       Glucose  125(H)       Gran #  10.1(H)       Gran%  83.0(H)       Hematocrit  22.8(L)       Hemoglobin  8.2(L)       Coumadin Monitoring INR  1.2  Comment:  Coumadin Therapy:  2.0 - 3.0 for INR for all indicators except mechanical heart valves  and antiphospholipid syndromes which should use 2.5 - 3.5.         Lymph #  0.8(L)       Lymph%  6.4(L)       Magnesium  2.4       MCH  32.5(H)       MCHC  36.0       MCV  91       Metamyelocytes         Mono #  1.3(H)       Mono%  10.6       MPV  9.3       Ovalocytes         Platelet Estimate         Platelets  181       POCT Glucose 145(H)  134(H) 145(H)     Poik         Potassium  4.4       Protime  12.2       RBC  2.52(L)       RDW  13.1       Sodium  140       Spherocytes         Stomatocytes         WBC  12.21             Significant Imaging:   Imaging Results          X-Ray Chest AP Portable (Final result)  Result time 06/01/17 08:40:54    Final result by Lisy Wilkins MD (06/01/17 08:40:54)                 Impression:     As above.            Electronically signed by: LISY WILKINS MD  Date:     06/01/17  Time:    08:40              Narrative:    Exam: XR CHEST AP PORTABLE    Clinical History: Respiratory distress. Post-op    Findings:     Interval extubation and removal of the NGT.  Stable position of a [place]-Sudha catheter.  No pneumothorax.  Otherwise no change from prior on May 31, 2017.                             X-Ray Chest AP Portable (Final result)  Result time 05/31/17 12:19:24    Final result by Nixon Bower MD (05/31/17 12:19:24)                  Impression:     Stable postop chest. Left basilar opacity as above.      Electronically signed by: ALEXIA LIRIANO MD  Date:     05/31/17  Time:    12:19              Narrative:    History: Postop    Median sternotomy. Normal heart size. Endotracheal tube, NG tube, and [place]-Sudha catheter in place. Right lung is clear. There is opacity left base consistent with atelectasis and/or pleural effusion.                                Assessment/Plan:      Acute blood loss anemia    -from recent surgery.   -Monitor  -Transfuse if needed          S/P AVR (aortic valve replacement)              Aortic stenosis, severe    S/p tissue valve replacement  Per Primary Team and Cardiology recommendations    6/1/17: s/p tissue valve replacement overall doing well just sore              Alcohol use    -Drinks 2 beers daily.   -monitor for ETOH withdrawal  -MVI, thiamine          Tobacco use    Counseled  -nicotine         Cocaine abuse    UDS positive 4/2017  -denies recent use. Counseled.          * Uncontrolled hypertension    Patient requiring vasopressor support; weaning              VTE Risk Mitigation         Ordered     enoxaparin injection 40 mg  Every 24 hours (non-standard times)     Route:  Subcutaneous        05/31/17 1130     Medium Risk of VTE  Once      05/31/17 1130          Emiliana Murphy MD  Department of Hospital Medicine   [facility]

## 2017-06-01 NOTE — PROGRESS NOTES
Spoke with Dr. Pickens regarding patient's recheck H/H.  Will hold off on transfusing at this time.  Keep cordis in for now.

## 2017-06-01 NOTE — PROGRESS NOTES
Cardiology Progress Note        SUBJECTIVE:   Patient is POD 1 s/p tissue AVR. Patient noted to have drop in H/H this morning to 7.1/19.6. Had large output on chest tube yesterday. Chest tube remains in place. Has received 1 unit of platelets, 2 units of FFP and 3 units of PRBC. Patient up in chair this morning. Complaining of surgical pain. Has been started on Cardene gtt for BP control after spike his BP. Started on Lisinopril and metoprolol. Cardene being weaned.       OBJECTIVE:     Vital Signs (Most Recent)  Temp: (!) 100.4 °F (38 °C) (06/01/17 1110)  Pulse: 66 (06/01/17 1200)  Resp: (!) 23 (06/01/17 1200)  BP: (!) 91/40 (06/01/17 1200)  SpO2: 95 % (06/01/17 1200)    Vital Signs Range (Last 24H):  Temp:  [97.4 °F (36.3 °C)-100.4 °F (38 °C)]   Pulse:  [59-81]   Resp:  [15-24]   BP: ()/(20-60)   SpO2:  [92 %-100 %]   Arterial Line BP: ()/(36-56)     Intake/Output last 3 shifts:  I/O last 3 completed shifts:  In: 5616.6 [I.V.:3853.3; Blood:1513.3; IV Piggyback:250]  Out: 3915 [Urine:1975; Chest Tube:1940]    Intake/Output this shift:  I/O this shift:  In: 300 [Blood:300]  Out: 1095 [Urine:955; Chest Tube:140]    Review of patient's allergies indicates:  No Known Allergies    Current Facility-Administered Medications   Medication    0.9%  NaCl infusion (for blood administration)    0.9%  NaCl infusion (for blood administration)    albumin human 5% bottle 250 mL    aspirin EC tablet 81 mg    benazepril tablet 10 mg    clopidogrel tablet 75 mg    dexmedetomidine (PRECEDEX) 400mcg/100mL 0.9% NaCL infusion    dextrose 50% injection 12.5 g    dextrose 50% injection 25 g    docusate sodium capsule 100 mg    enoxaparin injection 40 mg    famotidine tablet 20 mg    folic acid tablet 1 mg    furosemide injection 40 mg    hydrALAZINE injection 20 mg    hydrocodone-acetaminophen 10-325mg per tablet 1 tablet    hydrocodone-acetaminophen 5-325mg per tablet 1 tablet    hydrocodone-acetaminophen  7.5-325mg per tablet 1 tablet    insulin regular (Humulin R) 100 Units in sodium chloride 0.9% 100 mL infusion    lactated ringers infusion    magnesium sulfate 2g in water 50mL IVPB (premix)    metoprolol tartrate (LOPRESSOR) tablet 50 mg    morphine injection 2 mg    multivitamin tablet 1 tablet    mupirocin 2 % ointment 1 g    niCARdipine 40 mg/200 mL infusion    nicotine 21 mg/24 hr 1 patch    ondansetron injection 4 mg    potassium chloride 20 mEq in 100 mL IVPB (FOR CENTRAL LINE ADMINISTRATION ONLY)    And    potassium chloride 40 mEq in 100 mL IVPB (FOR CENTRAL LINE ADMINISTRATION ONLY)    And    potassium chloride 20 mEq in 100 mL IVPB (FOR CENTRAL LINE ADMINISTRATION ONLY)    potassium chloride SA CR tablet 40 mEq    promethazine (PHENERGAN) 6.25 mg in dextrose 5 % 50 mL IVPB    thiamine tablet 100 mg     No current facility-administered medications on file prior to encounter.      Current Outpatient Prescriptions on File Prior to Encounter   Medication Sig    aspirin (ECOTRIN) 81 MG EC tablet Take 1 tablet (81 mg total) by mouth once daily.    diltiaZEM (CARDIZEM CD) 240 MG 24 hr capsule Take 1 capsule (240 mg total) by mouth once daily. (Patient taking differently: Take 240 mg by mouth 2 (two) times daily. )       Physical Exam:  General appearance: alert, appears stated age and cooperative  Head: Normocephalic, without obvious abnormality, atraumatic  Neck: no adenopathy, no carotid bruit, no JVD. Right IJ Central line   Lungs: diminished to auscultation bilaterally  Chest wall: sternal surgical incision. C/D/I. Chest tube in place with SS drainage.   Heart: regular rate and rhythm, S1, S2 normal, no murmur, click, rub or gallop  Abdomen: soft, non-tender; bowel sounds normal; no masses,  no organomegaly  Extremities: extremities normal, atraumatic, no cyanosis or edema  Pulses: 2+ and symmetric  Skin: Skin color, texture, turgor normal. No rashes or lesions  Neurologic: Grossly  normal    Laboratory:  Chemistry:   Lab Results   Component Value Date     06/01/2017    K 4.1 06/01/2017     06/01/2017    CO2 25 06/01/2017    BUN 13 06/01/2017    CREATININE 0.8 06/01/2017    CALCIUM 7.9 (L) 06/01/2017     Cardiac Markers:   Lab Results   Component Value Date    TROPONINI 0.042 (H) 04/06/2017     Cardiac Markers (Last 3):   Lab Results   Component Value Date    TROPONINI 0.042 (H) 04/06/2017    TROPONINI 0.038 (H) 04/06/2017    TROPONINI 0.043 (H) 04/06/2017     CBC:   Lab Results   Component Value Date    WBC 14.30 (H) 06/01/2017    HGB 7.1 (L) 06/01/2017    HCT 19.6 (LL) 06/01/2017    HCT 25 (L) 05/31/2017    MCV 90 06/01/2017     (L) 06/01/2017     Lipids: No results found for: CHOL, TRIG, HDL, LDLDIRECT  Coagulation:   Lab Results   Component Value Date    INR 1.2 06/01/2017    APTT 30.6 05/31/2017       Diagnostic Results:  Pertinent labs and radiology reviewed by myself and supervising MD      ASSESSMENT/PLAN:     Patient Active Problem List   Diagnosis    Paroxysmal atrial fibrillation    Cocaine abuse    Tobacco use    Alcohol use    Aortic stenosis, severe    Uncontrolled hypertension    Aortic stenosis    Aortic valve disease    S/P AVR (aortic valve replacement)    Acute hyperglycemia    Acute blood loss anemia        Plan:  Continue post CABG care. Transfuse as needed. Wean Cardene gtt as tolerated. Will continue to follow     Chart reviewed. Patient examined by Dr. Troare  and agrees with plan that has been outlined.

## 2017-06-01 NOTE — CONSULTS
[facility] Hospital Medicine  Consult Note    Patient Name: Sukhjinder Kim  MRN: [id number]  Admission Date: 5/31/2017  Hospital Length of Stay: 0 days  Attending Physician: Gerardo Hernandez MD   Primary Care Provider: Primary Doctor No        Patient information was obtained from patient and ER records.     Consults  Subjective:     Principal Problem: <principal problem not specified>    Chief Complaint: No chief complaint on file.       HPI: Sukhjinder Kim is a 56 year old male with history of Hypertension, Tobacco abuse, ETOH use, and Aortic Stenosis who underwent aortic valve replacement today performed by Dr. Hernandez.  Patient is currently extubated and awake requesting pain medications. Hospital Medicine has been consulted for medical management.    Past Medical History:   Diagnosis Date    Hypertension     S/P AVR (aortic valve replacement) 5/31/2017    Tobacco abuse        Past Surgical History:   Procedure Laterality Date    APPENDECTOMY         Review of patient's allergies indicates:  No Known Allergies    No current facility-administered medications on file prior to encounter.      Current Outpatient Prescriptions on File Prior to Encounter   Medication Sig    aspirin (ECOTRIN) 81 MG EC tablet Take 1 tablet (81 mg total) by mouth once daily.    diltiaZEM (CARDIZEM CD) 240 MG 24 hr capsule Take 1 capsule (240 mg total) by mouth once daily. (Patient taking differently: Take 240 mg by mouth 2 (two) times daily. )     Family History     Problem Relation (Age of Onset)    Heart attacks under age 50 Father (42)    Valvular heart disease Father, Brother (27), Brother (62)        Social History Main Topics    Smoking status: Current Every Day Smoker     Packs/day: 0.20     Years: 40.00     Types: Cigarettes    Smokeless tobacco: Not on file      Comment: no smoking after m.n prior to surgery    Alcohol use Yes      Comment: daily - 6 pack beer  No alcohol prior to surgery    Drug use:       Types: Marijuana, Cocaine      Comment: no cocaine/marijuana prior to surgery    Sexual activity: Not on file     Review of Systems   Constitutional: Negative for chills, diaphoresis, fatigue and fever.   HENT: Negative for drooling, ear pain, rhinorrhea and sore throat.    Eyes: Negative.    Respiratory: Negative for cough, shortness of breath and wheezing.    Cardiovascular: Negative for palpitations and leg swelling.   Gastrointestinal: Negative for abdominal pain, constipation, diarrhea and nausea.   Endocrine: Negative.    Genitourinary: Negative for dysuria, hematuria and urgency.   Musculoskeletal: Positive for arthralgias.        Postoperative pain     Skin: Negative for color change and wound.   Allergic/Immunologic: Negative.    Neurological: Negative for dizziness, syncope and speech difficulty.   Hematological: Negative.    Psychiatric/Behavioral: Negative.      Objective:     Vital Signs (Most Recent):  Temp: 99.5 °F (37.5 °C) (05/31/17 2000)  Pulse: 66 (05/31/17 2000)  Resp: (!) 21 (05/31/17 2000)  BP: (!) 101/31 (05/31/17 2000)  SpO2: 98 % (05/31/17 2000) Vital Signs (24h Range):  Temp:  [97.4 °F (36.3 °C)-99.5 °F (37.5 °C)] 99.5 °F (37.5 °C)  Pulse:  [58-81] 66  Resp:  [15-23] 21  SpO2:  [95 %-100 %] 98 %  BP: ()/(20-90) 101/31  Arterial Line BP: ()/(30-56) 109/38     Weight: 111.4 kg (245 lb 9.5 oz)  Body mass index is 36.27 kg/m².    Physical Exam   Constitutional: He is oriented to person, place, and time. He appears well-developed and well-nourished. No distress.   HENT:   Head: Normocephalic and atraumatic.   Eyes: EOM are normal.   Neck: Normal range of motion. Neck supple.   Cardiovascular: Normal rate, regular rhythm and normal heart sounds.    Pulmonary/Chest: Effort normal and breath sounds normal. No respiratory distress.   CT noted   Abdominal: Soft. Bowel sounds are normal. He exhibits no distension. There is no tenderness.   Musculoskeletal: Normal range of motion. He  exhibits edema.   Neurological: He is alert and oriented to person, place, and time.   Skin: Skin is dry.   Midsternal dressing intact without drainage   Nursing note and vitals reviewed.      Significant Labs:   CBC:   Recent Labs  Lab 05/31/17  0736  05/31/17  1014 05/31/17  1125 05/31/17  1904   WBC  --   --   --  18.61* 12.21   HGB 11.9*  --   --  8.9* 8.2*   HCT 33.9*  < > 25* 24.7* 22.8*     --   --  160 181   < > = values in this interval not displayed.  CMP:   Recent Labs  Lab 05/31/17  1125 05/31/17  1904    140   K 4.6  4.6 4.4    107   CO2 26 26    125*   BUN 11 13   CREATININE 0.7 0.9   CALCIUM 7.2* 8.1*   ANIONGAP 6* 7*   EGFRNONAA >60 >60       Significant Imaging:   Imaging Results          X-Ray Chest AP Portable (Final result)  Result time 05/31/17 12:19:24    Final result by Nixon Bower MD (05/31/17 12:19:24)                 Impression:     Stable postop chest. Left basilar opacity as above.      Electronically signed by: NIXON BOWER MD  Date:     05/31/17  Time:    12:19              Narrative:    History: Postop    Median sternotomy. Normal heart size. Endotracheal tube, NG tube, and [place]-Sudha catheter in place. Right lung is clear. There is opacity left base consistent with atelectasis and/or pleural effusion.                                Assessment/Plan:     Acute blood loss anemia    -from recent surgery.   -Monitor  -Transfuse if needed          Essential hypertension    Patient requiring vasopressor support; weaning            Aortic stenosis, severe    S/p tissue valve replacement  Per Primary Team and Cardiology recommendations              Alcohol use    -Drinks 2 beers daily.   -monitor for ETOH withdrawal  -MVI, thiamine          Tobacco use    Counseled  -nicotine         Cocaine abuse    UDS positive 4/2017  -denies recent use. Counseled.            VTE Risk Mitigation         Ordered     enoxaparin injection 40 mg  Every 24 hours (non-standard  times)     Route:  Subcutaneous        05/31/17 1130     Medium Risk of VTE  Once      05/31/17 1130        Thank you for your consult. I will follow-up with patient. Please contact us if you have any additional questions.    Kerri Quezada NP  Department of Hospital Medicine   [facility]

## 2017-06-02 PROBLEM — D72.829 LEUKOCYTOSIS: Status: ACTIVE | Noted: 2017-06-02

## 2017-06-02 PROBLEM — I48.91 ATRIAL FIBRILLATION WITH RVR: Status: ACTIVE | Noted: 2017-06-02

## 2017-06-02 LAB
ANION GAP SERPL CALC-SCNC: 6 MMOL/L
BASOPHILS # BLD AUTO: 0.04 K/UL
BASOPHILS NFR BLD: 0.2 %
BUN SERPL-MCNC: 16 MG/DL
CALCIUM SERPL-MCNC: 8.1 MG/DL
CHLORIDE SERPL-SCNC: 98 MMOL/L
CO2 SERPL-SCNC: 28 MMOL/L
CREAT SERPL-MCNC: 0.8 MG/DL
DIFFERENTIAL METHOD: ABNORMAL
EOSINOPHIL # BLD AUTO: 0 K/UL
EOSINOPHIL NFR BLD: 0 %
ERYTHROCYTE [DISTWIDTH] IN BLOOD BY AUTOMATED COUNT: 14.4 %
EST. GFR  (AFRICAN AMERICAN): >60 ML/MIN/1.73 M^2
EST. GFR  (NON AFRICAN AMERICAN): >60 ML/MIN/1.73 M^2
GLUCOSE SERPL-MCNC: 119 MG/DL
HCT VFR BLD AUTO: 22.7 %
HGB BLD-MCNC: 7.9 G/DL
LYMPHOCYTES # BLD AUTO: 2.8 K/UL
LYMPHOCYTES NFR BLD: 13.4 %
MAGNESIUM SERPL-MCNC: 1.9 MG/DL
MCH RBC QN AUTO: 31 PG
MCHC RBC AUTO-ENTMCNC: 34.8 %
MCV RBC AUTO: 89 FL
MONOCYTES # BLD AUTO: 2.5 K/UL
MONOCYTES NFR BLD: 11.9 %
NEUTROPHILS # BLD AUTO: 15.7 K/UL
NEUTROPHILS NFR BLD: 74.9 %
PLATELET # BLD AUTO: 137 K/UL
PMV BLD AUTO: 9.6 FL
POCT GLUCOSE: 104 MG/DL (ref 70–110)
POCT GLUCOSE: 109 MG/DL (ref 70–110)
POCT GLUCOSE: 134 MG/DL (ref 70–110)
POCT GLUCOSE: 143 MG/DL (ref 70–110)
POCT GLUCOSE: 158 MG/DL (ref 70–110)
POCT GLUCOSE: 162 MG/DL (ref 70–110)
POCT GLUCOSE: 163 MG/DL (ref 70–110)
POCT GLUCOSE: 174 MG/DL (ref 70–110)
POCT GLUCOSE: 176 MG/DL (ref 70–110)
POCT GLUCOSE: 94 MG/DL (ref 70–110)
POTASSIUM SERPL-SCNC: 4.4 MMOL/L
RBC # BLD AUTO: 2.55 M/UL
SODIUM SERPL-SCNC: 132 MMOL/L
WBC # BLD AUTO: 21.01 K/UL

## 2017-06-02 PROCEDURE — 63600175 PHARM REV CODE 636 W HCPCS: Performed by: INTERNAL MEDICINE

## 2017-06-02 PROCEDURE — 96372 THER/PROPH/DIAG INJ SC/IM: CPT

## 2017-06-02 PROCEDURE — 25000003 PHARM REV CODE 250: Performed by: THORACIC SURGERY (CARDIOTHORACIC VASCULAR SURGERY)

## 2017-06-02 PROCEDURE — 63600175 PHARM REV CODE 636 W HCPCS: Performed by: THORACIC SURGERY (CARDIOTHORACIC VASCULAR SURGERY)

## 2017-06-02 PROCEDURE — 97110 THERAPEUTIC EXERCISES: CPT

## 2017-06-02 PROCEDURE — 80048 BASIC METABOLIC PNL TOTAL CA: CPT

## 2017-06-02 PROCEDURE — 25000003 PHARM REV CODE 250: Performed by: PHYSICIAN ASSISTANT

## 2017-06-02 PROCEDURE — 83735 ASSAY OF MAGNESIUM: CPT

## 2017-06-02 PROCEDURE — 63600175 PHARM REV CODE 636 W HCPCS: Performed by: PHYSICIAN ASSISTANT

## 2017-06-02 PROCEDURE — 85025 COMPLETE CBC W/AUTO DIFF WBC: CPT

## 2017-06-02 PROCEDURE — 25000003 PHARM REV CODE 250: Performed by: INTERNAL MEDICINE

## 2017-06-02 PROCEDURE — 99233 SBSQ HOSP IP/OBS HIGH 50: CPT | Mod: ,,, | Performed by: INTERNAL MEDICINE

## 2017-06-02 PROCEDURE — 93010 ELECTROCARDIOGRAM REPORT: CPT | Mod: ,,, | Performed by: INTERNAL MEDICINE

## 2017-06-02 PROCEDURE — 63600175 PHARM REV CODE 636 W HCPCS: Performed by: NURSE PRACTITIONER

## 2017-06-02 PROCEDURE — 25000003 PHARM REV CODE 250: Performed by: NURSE PRACTITIONER

## 2017-06-02 PROCEDURE — 27000221 HC OXYGEN, UP TO 24 HOURS

## 2017-06-02 PROCEDURE — 97530 THERAPEUTIC ACTIVITIES: CPT

## 2017-06-02 PROCEDURE — 94799 UNLISTED PULMONARY SVC/PX: CPT

## 2017-06-02 PROCEDURE — 97116 GAIT TRAINING THERAPY: CPT

## 2017-06-02 PROCEDURE — 99900035 HC TECH TIME PER 15 MIN (STAT)

## 2017-06-02 PROCEDURE — 93005 ELECTROCARDIOGRAM TRACING: CPT

## 2017-06-02 PROCEDURE — 21400001 HC TELEMETRY ROOM

## 2017-06-02 PROCEDURE — 99291 CRITICAL CARE FIRST HOUR: CPT | Mod: ,,, | Performed by: NURSE PRACTITIONER

## 2017-06-02 RX ORDER — POTASSIUM CHLORIDE 20 MEQ/1
20 TABLET, EXTENDED RELEASE ORAL 2 TIMES DAILY
Status: DISCONTINUED | OUTPATIENT
Start: 2017-06-02 | End: 2017-06-04

## 2017-06-02 RX ORDER — IBUPROFEN 200 MG
16 TABLET ORAL
Status: DISCONTINUED | OUTPATIENT
Start: 2017-06-02 | End: 2017-06-06 | Stop reason: HOSPADM

## 2017-06-02 RX ORDER — IBUPROFEN 200 MG
24 TABLET ORAL
Status: DISCONTINUED | OUTPATIENT
Start: 2017-06-02 | End: 2017-06-06 | Stop reason: HOSPADM

## 2017-06-02 RX ORDER — AMIODARONE HYDROCHLORIDE 200 MG/1
400 TABLET ORAL DAILY
Status: DISCONTINUED | OUTPATIENT
Start: 2017-06-02 | End: 2017-06-05

## 2017-06-02 RX ORDER — INSULIN ASPART 100 [IU]/ML
1-10 INJECTION, SOLUTION INTRAVENOUS; SUBCUTANEOUS
Status: DISCONTINUED | OUTPATIENT
Start: 2017-06-02 | End: 2017-06-06 | Stop reason: HOSPADM

## 2017-06-02 RX ORDER — MORPHINE SULFATE 2 MG/ML
2 INJECTION, SOLUTION INTRAMUSCULAR; INTRAVENOUS EVERY 6 HOURS PRN
Status: DISCONTINUED | OUTPATIENT
Start: 2017-06-02 | End: 2017-06-06 | Stop reason: HOSPADM

## 2017-06-02 RX ORDER — GLUCAGON 1 MG
1 KIT INJECTION
Status: DISCONTINUED | OUTPATIENT
Start: 2017-06-02 | End: 2017-06-06 | Stop reason: HOSPADM

## 2017-06-02 RX ORDER — METOPROLOL TARTRATE 25 MG/1
25 TABLET, FILM COATED ORAL 2 TIMES DAILY
Status: DISCONTINUED | OUTPATIENT
Start: 2017-06-02 | End: 2017-06-06 | Stop reason: HOSPADM

## 2017-06-02 RX ADMIN — INSULIN ASPART 2 UNITS: 100 INJECTION, SOLUTION INTRAVENOUS; SUBCUTANEOUS at 06:06

## 2017-06-02 RX ADMIN — HYDROCODONE BITARTRATE AND ACETAMINOPHEN 1 TABLET: 10; 325 TABLET ORAL at 04:06

## 2017-06-02 RX ADMIN — ENOXAPARIN SODIUM 40 MG: 100 INJECTION SUBCUTANEOUS at 04:06

## 2017-06-02 RX ADMIN — Medication 100 MG: at 08:06

## 2017-06-02 RX ADMIN — HYDROCODONE BITARTRATE AND ACETAMINOPHEN 1 TABLET: 10; 325 TABLET ORAL at 09:06

## 2017-06-02 RX ADMIN — FAMOTIDINE 20 MG: 20 TABLET ORAL at 08:06

## 2017-06-02 RX ADMIN — MUPIROCIN 1 G: 20 OINTMENT TOPICAL at 08:06

## 2017-06-02 RX ADMIN — FOLIC ACID 1 MG: 1 TABLET ORAL at 08:06

## 2017-06-02 RX ADMIN — FUROSEMIDE 40 MG: 10 INJECTION, SOLUTION INTRAMUSCULAR; INTRAVENOUS at 08:06

## 2017-06-02 RX ADMIN — THERA TABS 1 TABLET: TAB at 08:06

## 2017-06-02 RX ADMIN — MUPIROCIN 1 G: 20 OINTMENT TOPICAL at 09:06

## 2017-06-02 RX ADMIN — POTASSIUM CHLORIDE 20 MEQ: 1500 TABLET, EXTENDED RELEASE ORAL at 08:06

## 2017-06-02 RX ADMIN — INSULIN ASPART 1 UNITS: 100 INJECTION, SOLUTION INTRAVENOUS; SUBCUTANEOUS at 10:06

## 2017-06-02 RX ADMIN — CLOPIDOGREL BISULFATE 75 MG: 75 TABLET ORAL at 08:06

## 2017-06-02 RX ADMIN — DOCUSATE SODIUM 100 MG: 100 CAPSULE, LIQUID FILLED ORAL at 08:06

## 2017-06-02 RX ADMIN — ASPIRIN 81 MG: 81 TABLET, COATED ORAL at 08:06

## 2017-06-02 RX ADMIN — NICOTINE 1 PATCH: 21 PATCH, EXTENDED RELEASE TRANSDERMAL at 08:06

## 2017-06-02 RX ADMIN — AMIODARONE HYDROCHLORIDE 0.5 MG/MIN: 1.8 INJECTION, SOLUTION INTRAVENOUS at 04:06

## 2017-06-02 RX ADMIN — AMIODARONE HYDROCHLORIDE 0.5 MG/MIN: 1.8 INJECTION, SOLUTION INTRAVENOUS at 05:06

## 2017-06-02 RX ADMIN — AMIODARONE HYDROCHLORIDE 400 MG: 200 TABLET ORAL at 10:06

## 2017-06-02 RX ADMIN — MORPHINE SULFATE 2 MG: 2 INJECTION, SOLUTION INTRAMUSCULAR; INTRAVENOUS at 08:06

## 2017-06-02 RX ADMIN — MORPHINE SULFATE 2 MG: 2 INJECTION, SOLUTION INTRAMUSCULAR; INTRAVENOUS at 01:06

## 2017-06-02 RX ADMIN — HYDROCODONE BITARTRATE AND ACETAMINOPHEN 1 TABLET: 7.5; 325 TABLET ORAL at 11:06

## 2017-06-02 RX ADMIN — METOPROLOL TARTRATE 25 MG: 25 TABLET ORAL at 08:06

## 2017-06-02 NOTE — PT/OT/SLP PROGRESS
Physical Therapy      Sukhjinder Kim  MRN: [id number]    S: PT AGREEABLE TO BLE THEREX  O: PT FOUND SEATED IN CHAIR UPON ARRIVAL, C/O 3/10 PAIN TO STERNUM, PT ABLE TO RECITE 1/5 STERNAL PRECAUTIONS SO ALL REVIEWED WITH PT.  PT EDUCATED IN AND PERFORMED BLE THEREX X 20 REPS AROM WITH REST.  PT LEFT SEATED IN CHAIR WITH ALL NEEDS MET. PT ENCOURAGED TO CALL FOR ASSISTANCE WITH ALL NEEDS DUE TO FALL RISK STATUS, PT AGREEABLE.  PT ENCOURAGED TO INCREASE TIME OOB IN CHAIR, ALL MEALS IN CHAIR OOB, PT AGREEABLE  A: GOOD PARTICIPATION  P: CONT PER POC    Tori Zavaleta, PT   6/2/2017  [id number]

## 2017-06-02 NOTE — PROGRESS NOTES
"Progress Note  Critical Care    Admit Date: 5/31/2017   LOS: 2 days     Follow-up For: Post Op ICU care     SUBJECTIVE:   Change over last 24 hours: POD #2 S/P AVR.  Has hx of Paroxysmal A-Fib and last night went into A-Fib w/ RVR and started on Amiodarone infusion.  "Feels better" today and up in chair this AM.    ROS:  Review of Systems   Constitutional: Negative for chills, fever and malaise/fatigue.   HENT: Negative for congestion.    Eyes: Negative for blurred vision.   Respiratory: Positive for cough. Negative for sputum production and shortness of breath.    Cardiovascular: Positive for chest pain (post op). Negative for orthopnea and leg swelling.   Gastrointestinal: Negative for abdominal pain, nausea and vomiting.   Genitourinary: Negative for dysuria.   Musculoskeletal: Negative for myalgias.   Skin: Negative for rash.   Neurological: Positive for headaches. Negative for dizziness, focal weakness and weakness.   Endo/Heme/Allergies: Does not bruise/bleed easily.   Psychiatric/Behavioral: Negative for depression. The patient is not nervous/anxious.        Continuous Infusions:   amiodarone 0.5 mg/min (06/02/17 0700)     Review of patient's allergies indicates:  No Known Allergies    OBJECTIVE:     Vital Signs (Most Recent)  Temp: 99.9 °F (37.7 °C) (06/02/17 0705)  Pulse: 91 (06/02/17 0705)  Resp: (!) 21 (06/02/17 0705)  BP: (!) 95/59 (06/02/17 0705)  SpO2: 99 % (06/02/17 0705)    Vital Signs Range (Last 24H):  Temp:  [98.8 °F (37.1 °C)-100.4 °F (38 °C)]   Pulse:  []   Resp:  [17-27]   BP: ()/()   SpO2:  [91 %-99 %]   Arterial Line BP: ()/(41-56)     I & O (Last 24H):  Intake/Output Summary (Last 24 hours) at 06/02/17 0747  Last data filed at 06/02/17 0700   Gross per 24 hour   Intake          1741.06 ml   Output             3322 ml   Net         -1580.94 ml       Ventilator Data (Last 24H):          Physical Exam:  Physical Exam   Constitutional: He is oriented to person, place, " and time and well-developed, well-nourished, and in no distress. Vital signs are normal. He appears not lethargic, to not be writhing in pain and not malnourished. He appears healthy.  Non-toxic appearance. He does not have a sickly appearance. No distress.   HENT:   Head: Normocephalic and atraumatic.   Mouth/Throat: Oropharynx is clear and moist.   Eyes: EOM and lids are normal. Pupils are equal, round, and reactive to light.   Neck: Normal range of motion. Carotid bruit is not present.       Cardiovascular: An irregularly irregular rhythm present. Tachycardia present.    Pulses:       Radial pulses are 2+ on the right side, and 2+ on the left side.        Dorsalis pedis pulses are 1+ on the right side, and 1+ on the left side.   Pulmonary/Chest: Breath sounds normal. No accessory muscle usage. No respiratory distress.   Abdominal: Soft. He exhibits no distension. Bowel sounds are hypoactive. There is no tenderness.   obese   Genitourinary: Penis normal.   Musculoskeletal: Normal range of motion.   +1 dependent pedal edema   Lymphadenopathy:     He has no cervical adenopathy.   Neurological: He is alert and oriented to person, place, and time. He appears not lethargic.   Skin: Skin is warm and dry. He is not diaphoretic. No cyanosis.        Psychiatric: Mood, memory, affect and judgment normal.       Laboratory (Last 24H):  CBC:    Recent Labs  Lab 06/02/17  0401   WBC 21.01*   HGB 7.9*   HCT 22.7*   *     CMP:    Recent Labs  Lab 06/02/17  0401   CALCIUM 8.1*   *   K 4.4   CO2 28   CL 98   BUN 16   CREATININE 0.8       Chest X-Ray:         No recent film last 24 hours     ASSESSMENT/PLAN:     Problem   Atrial Fibrillation With Rvr   S/P Avr (Aortic Valve Replacement)   Acute Blood Loss Anemia   Aortic Stenosis, Severe   Leukocytosis   Essential Hypertension   Acute Hyperglycemia   Paroxysmal Atrial Fibrillation   Cocaine Abuse   Alcohol Use   Tobacco Use       PLAN:    1. Neuro: neuro  monitoring     2. Pulmonary: Encourage OOB, C&DB and IS use.  Encouraged tobacco cessation as well as cessation of cocaine and THC.  Cont Nicotine patch     3. Cardiac: Cardiac monitoring with CVT and Cards following.  On Amiodarone infusion started with A-Fib RVR last night. Cont ASA, Plavix, Lasix and Lopressor.      4. Renal: Kelsey removed, monitor I/Os      5. Infectious Disease: Follow fever curve, completed post op Ancef and Vanc     6. Hematology/Oncology: monitor for bleeding.  Transfused 2 units PRBCs yesterday.  CVT monitoring chest tube output, defer repeat transfusion today to CVT.     7. Endocrine:  Monitor glucose and change insulin infusion to SSI     8. Fluids/Electrolytes/Nutrition/GI: Encourage diet and cont thiamine, Folic Acid and MVI.  Encouraged etoh cessation.  Monitor electrolytes and replace as needed.      9. Musculoskeletal:  ROM and OOB     10. Pain Management: no issues      11. Discharge and Palliative Care: Plan home with family     Preventive Measures and Monitoring:   Stress Ulcer: Pepcid  Nutrition: Cardiac diet  Glucose control: SSI  Bowel prophylaxis: Colace  DVT prophylaxis: LMWH/SCDs  Hx CAD on B-Blocker: Lopressor  Head of Bed/Reposition: Elevate HOB and turn Q1-2 hours    Early Mobility: OOB now  Central Line Right IJ Cortis Day: #3  Chest tubes X 2 Day: #3  IVAB Day: complete  Code Status: Full    Counseling/Consultation: I have discussed the care of this patient in detail with multidisciplinary team during rounds, bedside nursing staff and Dr. Young and CVT surgery and Dr. Muprhy    Critical Care Time greater than: 45 Minutes    Critical care was time spent personally by me on the following activities: development of treatment plan with patient or surrogate and bedside caregivers, discussions with consultants, evaluation of patient's response to treatment, examination of patient, ordering and performing treatments and interventions, ordering and review of laboratory studies,  ordering and review of radiographic studies, pulse oximetry, re-evaluation of patient's condition.  This critical care time did not overlap with that of any other provider or involve time of any procedures.    VINCE Pedersen [facility]

## 2017-06-02 NOTE — PROGRESS NOTES
[facility] Hospital Medicine  Progress Note    Patient Name: Sukhjinder Kim  MRN: [id number]  Patient Class: IP- Inpatient   Admission Date: 5/31/2017  Length of Stay: 2 days  Attending Physician: Gerardo Hernandez MD  Primary Care Provider: Primary Doctor No        Subjective:     Principal Problem:Atrial fibrillation with RVR    HPI:  Sukhjinder Kim is a 56 year old male with history of Hypertension, Tobacco abuse, ETOH use, and Aortic Stenosis who underwent aortic valve replacement today performed by Dr. Hernandez.  Patient is currently extubated and awake requesting pain medications. Hospital Medicine has been consulted for medical management.    Hospital Course:  No notes on file    Interval History: 55 yo male s/p AVR. He is feeling better today and still having pain/soreness of his surgical scar. He also went into Afib RVR last night.    Review of Systems   Constitutional: Negative.    HENT: Negative.    Respiratory: Negative.    Cardiovascular: Positive for chest pain.        Pain at surgical site   Gastrointestinal: Negative.    All other systems reviewed and are negative.    Objective:     Vital Signs (Most Recent):  Temp: 99.9 °F (37.7 °C) (06/02/17 0705)  Pulse: 103 (06/02/17 0840)  Resp: (!) 22 (06/02/17 0840)  BP: (!) 103/47 (06/02/17 0840)  SpO2: 97 % (06/02/17 0840) Vital Signs (24h Range):  Temp:  [98.8 °F (37.1 °C)-100.4 °F (38 °C)] 99.9 °F (37.7 °C)  Pulse:  [] 103  Resp:  [17-27] 22  SpO2:  [91 %-100 %] 97 %  BP: ()/() 103/47  Arterial Line BP: ()/(41-56) 139/50     Weight: 116.3 kg (256 lb 6.3 oz)  Body mass index is 37.86 kg/m².    Intake/Output Summary (Last 24 hours) at 06/02/17 0845  Last data filed at 06/02/17 0800   Gross per 24 hour   Intake          2541.06 ml   Output             3251 ml   Net          -709.94 ml      Physical Exam   Constitutional: He is oriented to person, place, and time. He appears well-developed and well-nourished.   HENT:   Head:  Normocephalic and atraumatic.   Nose: Nose normal.   Mouth/Throat: Oropharynx is clear and moist.   Eyes: Conjunctivae and EOM are normal. Pupils are equal, round, and reactive to light.   Neck: Normal range of motion. Neck supple.   Cardiovascular: S1 normal and S2 normal.  An irregularly irregular rhythm present.   Pulmonary/Chest: Effort normal and breath sounds normal.   Abdominal: Soft. Bowel sounds are normal.   Genitourinary:   Genitourinary Comments: deferred   Musculoskeletal: Normal range of motion.   Neurological: He is alert and oriented to person, place, and time.   Skin: Skin is warm and dry. Capillary refill takes less than 2 seconds.   Nursing note and vitals reviewed.      Significant Labs:   Recent Lab Results  (Last 25 results in the past 24 hours)      06/02/17  0721 06/02/17  0605 06/02/17  0502 06/02/17  0411 06/02/17  0401      Anion Gap     6(L)     Baso #     0.04     Basophil%     0.2     BUN, Bld     16     Calcium     8.1(L)     Chloride     98     CO2     28     Creatinine     0.8     Differential Method     Automated     eGFR if      >60     eGFR if non      >60  Comment:  Calculation used to obtain the estimated glomerular filtration  rate (eGFR) is the CKD-EPI equation. Since race is unknown   in our information system, the eGFR values for   -American and Non--American patients are given   for each creatinine result.       Eos #     0.0     Eosinophil%     0.0     Glucose     119(H)     Gran #     15.7(H)     Gran%     74.9(H)     Hematocrit     22.7(L)     Hemoglobin     7.9(L)     Lymph #     2.8     Lymph%     13.4(L)     Magnesium     1.9     MCH     31.0     MCHC     34.8     MCV     89     Mono #     2.5(H)     Mono%     11.9     MPV     9.6     Platelets     137(L)     POCT Glucose 109 94 104 134(H)      Potassium     4.4     RBC     2.55(L)     RDW     14.4     Sodium     132(L)     WBC     21.01(H)                 06/02/17  0301  06/02/17  0203 06/02/17  0057 06/02/17  0017 06/01/17  2316      Anion Gap          Baso #          Basophil%          BUN, Bld          Calcium          Chloride          CO2          Creatinine          Differential Method          eGFR if           eGFR if non           Eos #          Eosinophil%          Glucose          Gran #          Gran%          Hematocrit          Hemoglobin          Lymph #          Lymph%          Magnesium          MCH          MCHC          MCV          Mono #          Mono%          MPV          Platelets          POCT Glucose 143(H) 163(H) 176(H) 174(H) 155(H)     Potassium          RBC          RDW          Sodium          WBC                      06/01/17  2240 06/01/17  2152 06/01/17  2104 06/01/17  2008 06/01/17  1856      Anion Gap          Baso #          Basophil%          BUN, Bld          Calcium          Chloride          CO2          Creatinine          Differential Method          eGFR if           eGFR if non           Eos #          Eosinophil%          Glucose          Gran #          Gran%          Hematocrit          Hemoglobin          Lymph #          Lymph%          Magnesium          MCH          MCHC          MCV          Mono #          Mono%          MPV          Platelets          POCT Glucose 141(H) 88 123(H) 133(H) 146(H)     Potassium          RBC          RDW          Sodium          WBC                      06/01/17  1738 06/01/17  1643 06/01/17  1503 06/01/17  1443 06/01/17  1355      Anion Gap          Baso #    0.02      Basophil%    0.1      BUN, Bld          Calcium          Chloride          CO2          Creatinine          Differential Method    Automated      eGFR if           eGFR if non           Eos #    0.0      Eosinophil%    0.0      Glucose          Gran #    14.5(H)      Gran%    75.6(H)      Hematocrit    24.2(L)      Hemoglobin    8.4(L)       Lymph #    2.4      Lymph%    12.7(L)      Magnesium          MCH    30.8      MCHC    34.7      MCV    89      Mono #    2.3(H)      Mono%    12.1      MPV    9.3      Platelets    131(L)      POCT Glucose 151(H) 123(H) 178(H)  183(H)     Potassium          RBC    2.73(L)      RDW    13.9      Sodium          WBC    19.27(H)                  06/01/17  1227 06/01/17  1151 06/01/17  1113 06/01/17  0928 06/01/17  0852      Anion Gap          Baso #          Basophil%          BUN, Bld          Calcium          Chloride          CO2          Creatinine          Differential Method          eGFR if           eGFR if non           Eos #          Eosinophil%          Glucose          Gran #          Gran%          Hematocrit          Hemoglobin          Lymph #          Lymph%          Magnesium          MCH          MCHC          MCV          Mono #          Mono%          MPV          Platelets          POCT Glucose 179(H) 154(H) 160(H) 200(H) 179(H)     Potassium          RBC          RDW          Sodium          WBC                          Significant Imaging:   Imaging Results          X-Ray Chest AP Portable (Final result)  Result time 06/01/17 08:40:54    Final result by Lisy Wilkins MD (06/01/17 08:40:54)                 Impression:     As above.            Electronically signed by: LISY WILKINS MD  Date:     06/01/17  Time:    08:40              Narrative:    Exam: XR CHEST AP PORTABLE    Clinical History: Respiratory distress. Post-op    Findings:     Interval extubation and removal of the NGT.  Stable position of a [place]-Sudha catheter.  No pneumothorax.  Otherwise no change from prior on May 31, 2017.                             X-Ray Chest AP Portable (Final result)  Result time 05/31/17 12:19:24    Final result by Alexia Bower MD (05/31/17 12:19:24)                 Impression:     Stable postop chest. Left basilar opacity as above.      Electronically signed by: ALEXIA  BRUNO LIRIANO MD  Date:     05/31/17  Time:    12:19              Narrative:    History: Postop    Median sternotomy. Normal heart size. Endotracheal tube, NG tube, and [place]-Sudha catheter in place. Right lung is clear. There is opacity left base consistent with atelectasis and/or pleural effusion.                                Assessment/Plan:      Acute blood loss anemia    -from recent surgery.   -Monitor  -Transfuse if needed    6/2/17: secondary surgery and did get blood on yesterday, CVT will transfuse per recommendation.        S/P AVR (aortic valve replacement)              Essential hypertension    Patient requiring vasopressor support; weaning            Aortic stenosis, severe    S/p tissue valve replacement  Per Primary Team and Cardiology recommendations    6/1/17: s/p tissue valve replacement overall doing well just sore    6/2/17: s/p AVR doing well still with some soreness.              Alcohol use    -Drinks 2 beers daily.   -monitor for ETOH withdrawal  -MVI, thiamine          Tobacco use    Counseled  -nicotine         Cocaine abuse    UDS positive 4/2017  -denies recent use. Counseled.          * Atrial fibrillation with RVR    Developed RVR last night now has amiodarone started and being managed per cardiology.            VTE Risk Mitigation         Ordered     enoxaparin injection 40 mg  Every 24 hours (non-standard times)     Route:  Subcutaneous        05/31/17 1130     Medium Risk of VTE  Once      05/31/17 1130          Emiliana Murphy MD  Department of Hospital Medicine   [facility]

## 2017-06-02 NOTE — SUBJECTIVE & OBJECTIVE
Interval History: 55 yo male s/p AVR. He is feeling better today and still having pain/soreness of his surgical scar. He also went into Afib RVR last night.    Review of Systems   Constitutional: Negative.    HENT: Negative.    Respiratory: Negative.    Cardiovascular: Positive for chest pain.        Pain at surgical site   Gastrointestinal: Negative.    All other systems reviewed and are negative.    Objective:     Vital Signs (Most Recent):  Temp: 99.9 °F (37.7 °C) (06/02/17 0705)  Pulse: 103 (06/02/17 0840)  Resp: (!) 22 (06/02/17 0840)  BP: (!) 103/47 (06/02/17 0840)  SpO2: 97 % (06/02/17 0840) Vital Signs (24h Range):  Temp:  [98.8 °F (37.1 °C)-100.4 °F (38 °C)] 99.9 °F (37.7 °C)  Pulse:  [] 103  Resp:  [17-27] 22  SpO2:  [91 %-100 %] 97 %  BP: ()/() 103/47  Arterial Line BP: ()/(41-56) 139/50     Weight: 116.3 kg (256 lb 6.3 oz)  Body mass index is 37.86 kg/m².    Intake/Output Summary (Last 24 hours) at 06/02/17 0845  Last data filed at 06/02/17 0800   Gross per 24 hour   Intake          2541.06 ml   Output             3251 ml   Net          -709.94 ml      Physical Exam   Constitutional: He is oriented to person, place, and time. He appears well-developed and well-nourished.   HENT:   Head: Normocephalic and atraumatic.   Nose: Nose normal.   Mouth/Throat: Oropharynx is clear and moist.   Eyes: Conjunctivae and EOM are normal. Pupils are equal, round, and reactive to light.   Neck: Normal range of motion. Neck supple.   Cardiovascular: S1 normal and S2 normal.  An irregularly irregular rhythm present.   Pulmonary/Chest: Effort normal and breath sounds normal.   Abdominal: Soft. Bowel sounds are normal.   Genitourinary:   Genitourinary Comments: deferred   Musculoskeletal: Normal range of motion.   Neurological: He is alert and oriented to person, place, and time.   Skin: Skin is warm and dry. Capillary refill takes less than 2 seconds.   Nursing note and vitals  reviewed.      Significant Labs:   Recent Lab Results  (Last 25 results in the past 24 hours)      06/02/17  0721 06/02/17  0605 06/02/17  0502 06/02/17  0411 06/02/17  0401      Anion Gap     6(L)     Baso #     0.04     Basophil%     0.2     BUN, Bld     16     Calcium     8.1(L)     Chloride     98     CO2     28     Creatinine     0.8     Differential Method     Automated     eGFR if      >60     eGFR if non      >60  Comment:  Calculation used to obtain the estimated glomerular filtration  rate (eGFR) is the CKD-EPI equation. Since race is unknown   in our information system, the eGFR values for   -American and Non--American patients are given   for each creatinine result.       Eos #     0.0     Eosinophil%     0.0     Glucose     119(H)     Gran #     15.7(H)     Gran%     74.9(H)     Hematocrit     22.7(L)     Hemoglobin     7.9(L)     Lymph #     2.8     Lymph%     13.4(L)     Magnesium     1.9     MCH     31.0     MCHC     34.8     MCV     89     Mono #     2.5(H)     Mono%     11.9     MPV     9.6     Platelets     137(L)     POCT Glucose 109 94 104 134(H)      Potassium     4.4     RBC     2.55(L)     RDW     14.4     Sodium     132(L)     WBC     21.01(H)                 06/02/17  0301 06/02/17  0203 06/02/17  0057 06/02/17  0017 06/01/17  2316      Anion Gap          Baso #          Basophil%          BUN, Bld          Calcium          Chloride          CO2          Creatinine          Differential Method          eGFR if           eGFR if non           Eos #          Eosinophil%          Glucose          Gran #          Gran%          Hematocrit          Hemoglobin          Lymph #          Lymph%          Magnesium          MCH          MCHC          MCV          Mono #          Mono%          MPV          Platelets          POCT Glucose 143(H) 163(H) 176(H) 174(H) 155(H)     Potassium          RBC          RDW           Sodium          WBC                      06/01/17  2240 06/01/17  2152 06/01/17  2104 06/01/17  2008 06/01/17  1856      Anion Gap          Baso #          Basophil%          BUN, Bld          Calcium          Chloride          CO2          Creatinine          Differential Method          eGFR if           eGFR if non           Eos #          Eosinophil%          Glucose          Gran #          Gran%          Hematocrit          Hemoglobin          Lymph #          Lymph%          Magnesium          MCH          MCHC          MCV          Mono #          Mono%          MPV          Platelets          POCT Glucose 141(H) 88 123(H) 133(H) 146(H)     Potassium          RBC          RDW          Sodium          WBC                      06/01/17  1738 06/01/17  1643 06/01/17  1503 06/01/17  1443 06/01/17  1355      Anion Gap          Baso #    0.02      Basophil%    0.1      BUN, Bld          Calcium          Chloride          CO2          Creatinine          Differential Method    Automated      eGFR if           eGFR if non           Eos #    0.0      Eosinophil%    0.0      Glucose          Gran #    14.5(H)      Gran%    75.6(H)      Hematocrit    24.2(L)      Hemoglobin    8.4(L)      Lymph #    2.4      Lymph%    12.7(L)      Magnesium          MCH    30.8      MCHC    34.7      MCV    89      Mono #    2.3(H)      Mono%    12.1      MPV    9.3      Platelets    131(L)      POCT Glucose 151(H) 123(H) 178(H)  183(H)     Potassium          RBC    2.73(L)      RDW    13.9      Sodium          WBC    19.27(H)                  06/01/17  1227 06/01/17  1151 06/01/17  1113 06/01/17  0928 06/01/17  0852      Anion Gap          Baso #          Basophil%          BUN, Bld          Calcium          Chloride          CO2          Creatinine          Differential Method          eGFR if           eGFR if non           Eos #           Eosinophil%          Glucose          Gran #          Gran%          Hematocrit          Hemoglobin          Lymph #          Lymph%          Magnesium          MCH          MCHC          MCV          Mono #          Mono%          MPV          Platelets          POCT Glucose 179(H) 154(H) 160(H) 200(H) 179(H)     Potassium          RBC          RDW          Sodium          WBC                          Significant Imaging:   Imaging Results          X-Ray Chest AP Portable (Final result)  Result time 06/01/17 08:40:54    Final result by Lisy Wilkins MD (06/01/17 08:40:54)                 Impression:     As above.            Electronically signed by: LISY WILKINS MD  Date:     06/01/17  Time:    08:40              Narrative:    Exam: XR CHEST AP PORTABLE    Clinical History: Respiratory distress. Post-op    Findings:     Interval extubation and removal of the NGT.  Stable position of a Troy-Sudha catheter.  No pneumothorax.  Otherwise no change from prior on May 31, 2017.                             X-Ray Chest AP Portable (Final result)  Result time 05/31/17 12:19:24    Final result by Alexia Bower MD (05/31/17 12:19:24)                 Impression:     Stable postop chest. Left basilar opacity as above.      Electronically signed by: ALEXIA BOWER MD  Date:     05/31/17  Time:    12:19              Narrative:    History: Postop    Median sternotomy. Normal heart size. Endotracheal tube, NG tube, and Troy-Sudha catheter in place. Right lung is clear. There is opacity left base consistent with atelectasis and/or pleural effusion.

## 2017-06-02 NOTE — ASSESSMENT & PLAN NOTE
-from recent surgery.   -Monitor  -Transfuse if needed    6/2/17: secondary surgery and did get blood on yesterday, CVT will transfuse per recommendation.

## 2017-06-02 NOTE — PROGRESS NOTES
POD 2 AVR (tissue)             No c/o , NAD           Episode of afib o/n, on amio gtt-- now SR           Up in chair, breathing well           CT still putting out bloody output, H/H 7.9/22.7            Diuresing well, cr stable            Inc c/d/i        Plan: deline / tele, likely d/c CTs saturday

## 2017-06-02 NOTE — PROGRESS NOTES
Cardiology Progress Note        SUBJECTIVE:   POD 2 s/p p tissue AVR. Did have episode of A-fib overnight and was started on amio drip. Converted to NSR this morning. H/H continues to drop despite receiving multiple blood products on yesterday. His pain is better controlled today. Chest tube remains in place. Output has slowed down today. Patient up in chair eating lunch. Ambulating with PT and using IS. Pacer wires DC'd today    OBJECTIVE:     Vital Signs (Most Recent)  Temp: 98.6 °F (37 °C) (06/02/17 1105)  Pulse: 71 (06/02/17 1105)  Resp: 19 (06/02/17 1105)  BP: (!) 139/53 (06/02/17 1105)  SpO2: 97 % (06/02/17 1105)    Vital Signs Range (Last 24H):  Temp:  [98.6 °F (37 °C)-99.9 °F (37.7 °C)]   Pulse:  []   Resp:  [17-27]   BP: ()/()   SpO2:  [91 %-100 %]   Arterial Line BP: (131-139)/(49-50)     Intake/Output last 3 shifts:  I/O last 3 completed shifts:  In: 2653.2 [P.O.:120; I.V.:1522.8; Blood:660.4; IV Piggyback:350]  Out: 4690 [Urine:3160; Chest Tube:1530]    Intake/Output this shift:  I/O this shift:  In: 1343.2 [P.O.:1340; I.V.:3.2]  Out: 115 [Urine:45; Chest Tube:70]    Review of patient's allergies indicates:  No Known Allergies    Current Facility-Administered Medications   Medication    albumin human 5% bottle 250 mL    amiodarone 360 mg/200 mL (1.8 mg/mL) infusion    amiodarone tablet 400 mg    aspirin EC tablet 81 mg    clopidogrel tablet 75 mg    dextrose 50% injection 12.5 g    dextrose 50% injection 25 g    docusate sodium capsule 100 mg    enoxaparin injection 40 mg    famotidine tablet 20 mg    folic acid tablet 1 mg    furosemide injection 40 mg    glucagon (human recombinant) injection 1 mg    glucose chewable tablet 16 g    glucose chewable tablet 24 g    hydrALAZINE injection 20 mg    hydrocodone-acetaminophen 10-325mg per tablet 1 tablet    hydrocodone-acetaminophen 5-325mg per tablet 1 tablet    hydrocodone-acetaminophen 7.5-325mg per tablet 1 tablet     insulin aspart pen 1-10 Units    lactated ringers infusion    magnesium sulfate 2g in water 50mL IVPB (premix)    metoprolol tartrate (LOPRESSOR) tablet 25 mg    morphine injection 2 mg    multivitamin tablet 1 tablet    mupirocin 2 % ointment 1 g    nicotine 21 mg/24 hr 1 patch    ondansetron injection 4 mg    pneumoc 13-damir conj-dip cr(PF) 0.5 mL    potassium chloride 20 mEq in 100 mL IVPB (FOR CENTRAL LINE ADMINISTRATION ONLY)    And    potassium chloride 40 mEq in 100 mL IVPB (FOR CENTRAL LINE ADMINISTRATION ONLY)    And    potassium chloride 20 mEq in 100 mL IVPB (FOR CENTRAL LINE ADMINISTRATION ONLY)    potassium chloride SA CR tablet 20 mEq    promethazine (PHENERGAN) 6.25 mg in dextrose 5 % 50 mL IVPB    thiamine tablet 100 mg     No current facility-administered medications on file prior to encounter.      Current Outpatient Prescriptions on File Prior to Encounter   Medication Sig    aspirin (ECOTRIN) 81 MG EC tablet Take 1 tablet (81 mg total) by mouth once daily.    diltiaZEM (CARDIZEM CD) 240 MG 24 hr capsule Take 1 capsule (240 mg total) by mouth once daily. (Patient taking differently: Take 240 mg by mouth 2 (two) times daily. )       Physical Exam:  General appearance: alert, appears stated age and cooperative  Head: Normocephalic, without obvious abnormality, atraumatic  Neck: no adenopathy, no carotid bruit, no JVD  Lungs:  Crackles  to auscultation bilateral bases  Chest wall: sternal surgical incision. C/D/I . Chest tube in place   Heart: regular rate and rhythm, S1, S2 normal, no murmur, click, rub or gallop  Abdomen: soft, non-tender; bowel sounds normal; no masses,  no organomegaly  Extremities: extremities normal, atraumatic, no cyanosis or edema  Pulses: 2+ and symmetric  Skin: Skin color, texture, turgor normal. No rashes or lesions  Neurologic: Grossly normal    Laboratory:  Chemistry:   Lab Results   Component Value Date     (L) 06/02/2017    K 4.4 06/02/2017    CL  98 06/02/2017    CO2 28 06/02/2017    BUN 16 06/02/2017    CREATININE 0.8 06/02/2017    CALCIUM 8.1 (L) 06/02/2017     Cardiac Markers:   Lab Results   Component Value Date    TROPONINI 0.042 (H) 04/06/2017     Cardiac Markers (Last 3):   Lab Results   Component Value Date    TROPONINI 0.042 (H) 04/06/2017    TROPONINI 0.038 (H) 04/06/2017    TROPONINI 0.043 (H) 04/06/2017     CBC:   Lab Results   Component Value Date    WBC 21.01 (H) 06/02/2017    HGB 7.9 (L) 06/02/2017    HCT 22.7 (L) 06/02/2017    HCT 25 (L) 05/31/2017    MCV 89 06/02/2017     (L) 06/02/2017     Lipids: No results found for: CHOL, TRIG, HDL, LDLDIRECT  Coagulation:   Lab Results   Component Value Date    INR 1.2 06/01/2017    APTT 30.6 05/31/2017       Diagnostic Results:  ECG: Reviewed  X-Ray: Reviewed  Echo: Reviewed      ASSESSMENT/PLAN:     Patient Active Problem List   Diagnosis    Paroxysmal atrial fibrillation    Cocaine abuse    Tobacco use    Alcohol use    Aortic stenosis, severe    Essential hypertension    Aortic stenosis    Aortic valve disease    S/P AVR (aortic valve replacement)    Acute hyperglycemia    Acute blood loss anemia    Atrial fibrillation with RVR    Leukocytosis        Plan:   Continue current medical management for now. Will plan to stop Amio gtt around 10pm and start Amio 400mg PO daily. Repeat EKG today and in the morning. Monitor CBC for decline and transfuse as needed. Appears stable enough to transfer to [facility]. Will continue to monitor.     Chart reviewed. Patient examined by Dr. Traore and agrees with plan that has been outlined.   .

## 2017-06-02 NOTE — PROGRESS NOTES
Pt given bath with chlorahexadine wipes and up to chair at this time.  Gown and linens changed.  Pt tolerated well.  Pt using IS with encouragement.

## 2017-06-02 NOTE — PROGRESS NOTES
1905 Assumed client care at this time.    2150 Insulin gtt paused at this time for 30 min per protocol for CBG 88    2200 Patient rhythm changed to Afib with HR in 120's-140's at this time.  BP stable.    2205 Attempted to contact CVT twice with no result.  Will contact on-call cardiology.    2230 MD Joyce contacted.  Orders to give 5mg IV Metoprolol x2 doses to attempt to convert rhythm back to Sinus.  If not effective will start Amiodarone with loading dose (see MAR)

## 2017-06-02 NOTE — PT/OT/SLP PROGRESS
Physical Therapy  Treatment    Sukhjinder Kim   MRN: [id number]   Admitting Diagnosis: Atrial fibrillation with RVR    PT Received On: 06/02/17  PT Start Time: 1350     PT Stop Time: 1415    PT Total Time (min): 25 min       Billable Minutes:  Gait [id number] and Therapeutic Activity 10    Treatment Type: Treatment  PT/PTA: PT         General Precautions: Standard, fall, respiratory, sternal  Orthopedic Precautions: N/A     Subjective:  Communicated with NURSE MCWILLIAMS prior to session.  Pain/Comfort  Pain Rating 1: 3/10  Location 1: sternal    Objective:   Patient found with: telemetry, oxygen, peripheral IV, chest tube    Functional Mobility:  Bed Mobility:   Rolling/Turning to Left: Minimum assistance  Rolling/Turning Right: Minimum assistance  Scooting/Bridging: Minimum Assistance (SEATED SCOOT IN BED AND CHAIR)  Supine to Sit: Maximum Assistance, Moderate Assistance (DIRECTION FOR CORRECT TECHNIQUE)    Transfers:  Sit <> Stand Assistance: Contact Guard Assistance  Sit <> Stand Assistive Device: No Assistive Device  Bed <> Chair Technique: Stand Pivot  Bed <> Chair Assistance: Contact Guard Assistance  Bed <> Chair Assistive Device: No Assistive Device    Gait:   Gait Distance: PT ' WITH CGA, NO LOB OR SOB WITH O2 IN TOW  Assistance 1: Contact Guard Assistance  Gait Assistive Device: No device  Gait Pattern: swing-through gait  Gait Deviation(s): decreased kevin    Balance:   Static Sit: FAIR+  Dynamic Sit: FAIR  Static Stand: FAIR  Dynamic stand:  FAIR    Therapeutic Activities and Exercises:  PT ABLE TO RECITE 4/5 STERNAL PRECAUTIONS SO ALL REVIEWED WITH PT.  EXTRA TIME REQUIRED FOR BED MOBILITY BUT TIME ALLOWED FOR ACTIVE PARTICIPATION, REVIEWED BLE THEREX WITH PT TO PERFORM WHILE SEATED IN CHAIR    AM-PAC 6 CLICK MOBILITY  How much help from another person does this patient currently need?   1 = Unable, Total/Dependent Assistance  2 = A lot, Maximum/Moderate Assistance  3 = A little, Minimum/Contact  Guard/Supervision  4 = None, Modified [place]/Independent    Turning over in bed (including adjusting bedclothes, sheets and blankets)?: 3  Sitting down on and standing up from a chair with arms (e.g., wheelchair, bedside commode, etc.): 3  Moving from lying on back to sitting on the side of the bed?: 2  Moving to and from a bed to a chair (including a wheelchair)?: 3  Need to walk in hospital room?: 3  Climbing 3-5 steps with a railing?: 1  Total Score: 15    AM-PAC Raw Score CMS G-Code Modifier Level of Impairment Assistance   6 % Total / Unable   7 - 9 CM 80 - 100% Maximal Assist   10 - 14 CL 60 - 80% Moderate Assist   15 - 19 CK 40 - 60% Moderate Assist   20 - 22 CJ 20 - 40% Minimal Assist   23 CI 1-20% SBA / CGA   24 CH 0% Independent/ Mod I     Patient left up in chair with all lines intact, call button in reach and NURSE notified.    Assessment:  Sukhjinder Kim is a 56 y.o. male with a medical diagnosis of Atrial fibrillation with RVR   PT WILL BENEFIT FROM CONT. SKILLED P.T. TO ADDRESS IMPAIRMENTS    Rehab identified problem list/impairments: Rehab identified problem list/impairments: impaired endurance, impaired functional mobilty, gait instability, impaired balance, decreased coordination, decreased safety awareness    Rehab potential is good.    Activity tolerance: Good    Discharge recommendations: Discharge Facility/Level Of Care Needs: home health PT, FAMILY ASSISTANCE AS NEEDED    Barriers to discharge: Barriers to Discharge: None    Equipment recommendations: Equipment Needed After Discharge: bath bench     GOALS:    Physical Therapy Goals        Problem: Physical Therapy Goal    Goal Priority Disciplines Outcome Goal Variances Interventions   Physical Therapy Goal     PT/OT, PT      Description:  LTG'S TO BE MET IN 7 DAYS (6-8-17)  1. PT WILL REQUIRE SBA FOR BED MOBILITY  2. PT WILL REQUIRE SPV FOR TF'S  3. PT WILL ' WITH SPV  4. PT WILL DEMO G DYNAMIC BALANCE DURING GAIT  5. PT  WILL TOLERATE BLE THEREX X 20 REPS AROM                   PLAN:    Patient to be seen 5 x/week  to address the above listed problems via gait training, therapeutic activities, therapeutic exercises  Plan of Care expires: 06/08/17  Plan of Care reviewed with: patient    PT ENCOURAGED TO CALL FOR ASSISTANCE WITH ALL NEEDS DUE TO FALL RISK STATUS, PT AGREEABLE.  PT ENCOURAGED TO INCREASE TIME OOB IN CHAIR, ALL MEALS IN CHAIR OOB, PT AGREEABLE    Tori Zavaleta, PT  06/02/2017

## 2017-06-03 LAB
ANION GAP SERPL CALC-SCNC: 10 MMOL/L
BASOPHILS # BLD AUTO: 0.09 K/UL
BASOPHILS NFR BLD: 0.4 %
BUN SERPL-MCNC: 17 MG/DL
CALCIUM SERPL-MCNC: 8.7 MG/DL
CHLORIDE SERPL-SCNC: 95 MMOL/L
CO2 SERPL-SCNC: 25 MMOL/L
CREAT SERPL-MCNC: 0.7 MG/DL
DIFFERENTIAL METHOD: ABNORMAL
EOSINOPHIL # BLD AUTO: 0 K/UL
EOSINOPHIL NFR BLD: 0.1 %
ERYTHROCYTE [DISTWIDTH] IN BLOOD BY AUTOMATED COUNT: 13.8 %
EST. GFR  (AFRICAN AMERICAN): >60 ML/MIN/1.73 M^2
EST. GFR  (NON AFRICAN AMERICAN): >60 ML/MIN/1.73 M^2
GLUCOSE SERPL-MCNC: 131 MG/DL
HCT VFR BLD AUTO: 26.5 %
HGB BLD-MCNC: 9 G/DL
LYMPHOCYTES # BLD AUTO: 3.2 K/UL
LYMPHOCYTES NFR BLD: 14 %
MAGNESIUM SERPL-MCNC: 1.9 MG/DL
MCH RBC QN AUTO: 30.8 PG
MCHC RBC AUTO-ENTMCNC: 34 %
MCV RBC AUTO: 91 FL
MONOCYTES # BLD AUTO: 3 K/UL
MONOCYTES NFR BLD: 13.2 %
NEUTROPHILS # BLD AUTO: 16.2 K/UL
NEUTROPHILS NFR BLD: 72.7 %
PLATELET # BLD AUTO: 154 K/UL
PLATELET BLD QL SMEAR: ABNORMAL
PMV BLD AUTO: 9.6 FL
POCT GLUCOSE: 137 MG/DL (ref 70–110)
POCT GLUCOSE: 138 MG/DL (ref 70–110)
POCT GLUCOSE: 158 MG/DL (ref 70–110)
POCT GLUCOSE: 160 MG/DL (ref 70–110)
POCT GLUCOSE: 181 MG/DL (ref 70–110)
POLYCHROMASIA BLD QL SMEAR: ABNORMAL
POTASSIUM SERPL-SCNC: 4.7 MMOL/L
RBC # BLD AUTO: 2.92 M/UL
SODIUM SERPL-SCNC: 130 MMOL/L
WBC # BLD AUTO: 22.45 K/UL

## 2017-06-03 PROCEDURE — 27000221 HC OXYGEN, UP TO 24 HOURS

## 2017-06-03 PROCEDURE — 21400001 HC TELEMETRY ROOM

## 2017-06-03 PROCEDURE — 94799 UNLISTED PULMONARY SVC/PX: CPT

## 2017-06-03 PROCEDURE — 83735 ASSAY OF MAGNESIUM: CPT

## 2017-06-03 PROCEDURE — 25000003 PHARM REV CODE 250: Performed by: NURSE PRACTITIONER

## 2017-06-03 PROCEDURE — 85025 COMPLETE CBC W/AUTO DIFF WBC: CPT

## 2017-06-03 PROCEDURE — 97110 THERAPEUTIC EXERCISES: CPT

## 2017-06-03 PROCEDURE — 93005 ELECTROCARDIOGRAM TRACING: CPT

## 2017-06-03 PROCEDURE — 97116 GAIT TRAINING THERAPY: CPT

## 2017-06-03 PROCEDURE — 25000003 PHARM REV CODE 250: Performed by: THORACIC SURGERY (CARDIOTHORACIC VASCULAR SURGERY)

## 2017-06-03 PROCEDURE — 93010 ELECTROCARDIOGRAM REPORT: CPT | Mod: ,,, | Performed by: INTERNAL MEDICINE

## 2017-06-03 PROCEDURE — 25000003 PHARM REV CODE 250: Performed by: PHYSICIAN ASSISTANT

## 2017-06-03 PROCEDURE — 80048 BASIC METABOLIC PNL TOTAL CA: CPT

## 2017-06-03 PROCEDURE — 63600175 PHARM REV CODE 636 W HCPCS: Performed by: PHYSICIAN ASSISTANT

## 2017-06-03 PROCEDURE — 99232 SBSQ HOSP IP/OBS MODERATE 35: CPT | Mod: ,,, | Performed by: INTERNAL MEDICINE

## 2017-06-03 PROCEDURE — 36415 COLL VENOUS BLD VENIPUNCTURE: CPT

## 2017-06-03 PROCEDURE — 25000003 PHARM REV CODE 250: Performed by: INTERNAL MEDICINE

## 2017-06-03 RX ADMIN — Medication 100 MG: at 08:06

## 2017-06-03 RX ADMIN — DOCUSATE SODIUM 100 MG: 100 CAPSULE, LIQUID FILLED ORAL at 08:06

## 2017-06-03 RX ADMIN — METOPROLOL TARTRATE 25 MG: 25 TABLET ORAL at 08:06

## 2017-06-03 RX ADMIN — AMIODARONE HYDROCHLORIDE 400 MG: 200 TABLET ORAL at 08:06

## 2017-06-03 RX ADMIN — FOLIC ACID 1 MG: 1 TABLET ORAL at 08:06

## 2017-06-03 RX ADMIN — FAMOTIDINE 20 MG: 20 TABLET ORAL at 08:06

## 2017-06-03 RX ADMIN — NICOTINE 1 PATCH: 21 PATCH, EXTENDED RELEASE TRANSDERMAL at 08:06

## 2017-06-03 RX ADMIN — POTASSIUM CHLORIDE 20 MEQ: 1500 TABLET, EXTENDED RELEASE ORAL at 08:06

## 2017-06-03 RX ADMIN — CLOPIDOGREL BISULFATE 75 MG: 75 TABLET ORAL at 08:06

## 2017-06-03 RX ADMIN — FUROSEMIDE 40 MG: 10 INJECTION, SOLUTION INTRAMUSCULAR; INTRAVENOUS at 08:06

## 2017-06-03 RX ADMIN — HYDROCODONE BITARTRATE AND ACETAMINOPHEN 1 TABLET: 7.5; 325 TABLET ORAL at 06:06

## 2017-06-03 RX ADMIN — ENOXAPARIN SODIUM 40 MG: 100 INJECTION SUBCUTANEOUS at 04:06

## 2017-06-03 RX ADMIN — INSULIN ASPART 2 UNITS: 100 INJECTION, SOLUTION INTRAVENOUS; SUBCUTANEOUS at 11:06

## 2017-06-03 RX ADMIN — ASPIRIN 81 MG: 81 TABLET, COATED ORAL at 08:06

## 2017-06-03 RX ADMIN — INSULIN ASPART 2 UNITS: 100 INJECTION, SOLUTION INTRAVENOUS; SUBCUTANEOUS at 05:06

## 2017-06-03 RX ADMIN — HYDROCODONE BITARTRATE AND ACETAMINOPHEN 1 TABLET: 7.5; 325 TABLET ORAL at 01:06

## 2017-06-03 RX ADMIN — HYDROCODONE BITARTRATE AND ACETAMINOPHEN 1 TABLET: 10; 325 TABLET ORAL at 08:06

## 2017-06-03 RX ADMIN — MUPIROCIN 1 G: 20 OINTMENT TOPICAL at 08:06

## 2017-06-03 RX ADMIN — THERA TABS 1 TABLET: TAB at 08:06

## 2017-06-03 NOTE — PT/OT/SLP PROGRESS
Physical Therapy  Treatment    Sukhjinder Kim   MRN: [id number]   Admitting Diagnosis: Atrial fibrillation with RVR    PT Received On: 06/03/17  PT Start Time: 1123     PT Stop Time: 1146    PT Total Time (min): 23 min       Billable Minutes:  Gait [id number] and Therapeutic Exercise 13    Treatment Type: Treatment  PT/PTA: PTA     PTA Visit Number: 1       General Precautions: Standard, fall, respiratory, sternal  Orthopedic Precautions: N/A   Braces:           Subjective:  Communicated with epic and nurse Pau CALLOWAY prior to session.      Pain/Comfort  Pain Rating 1: 0/10  Pain Rating Post-Intervention 2: 0/10    Objective:   Patient found with: oxygen, chest tube, telemetry    Functional Mobility:  Bed Mobility:        Transfers:  Sit <> Stand Assistance: Contact Guard Assistance  Sit <> Stand Assistive Device: No Assistive Device    Gait:   Gait Distance: pt amb 220 feet in [place]  Assistance 1: Contact Guard Assistance  Gait Assistive Device: No device  Gait Pattern: swing-through gait    Stairs:      Balance:   Static Sit: FAIR: Maintains without assist, but unable to take any challenges   Dynamic Sit: FAIR+: Maintains balance through MINIMAL excursions of active trunk motion  Static Stand: FAIR+: Takes MINIMAL challenges from all directions  Dynamic stand: FAIR: Needs CONTACT GUARD during gait     Therapeutic Activities and Exercises:  Pt ambulated and performed seated exercises.     AM-PAC 6 CLICK MOBILITY  How much help from another person does this patient currently need?   1 = Unable, Total/Dependent Assistance  2 = A lot, Maximum/Moderate Assistance  3 = A little, Minimum/Contact Guard/Supervision  4 = None, Modified [address]/Independent    Turning over in bed (including adjusting bedclothes, sheets and blankets)?: 3  Sitting down on and standing up from a chair with arms (e.g., wheelchair, bedside commode, etc.): 4  Moving from lying on back to sitting on the side of the bed?: 3  Moving to and from a bed  to a chair (including a wheelchair)?: 3  Need to walk in hospital room?: 3  Climbing 3-5 steps with a railing?: 1 (NT)  Total Score: 17    AM-PAC Raw Score CMS G-Code Modifier Level of Impairment Assistance   6 % Total / Unable   7 - 9 CM 80 - 100% Maximal Assist   10 - 14 CL 60 - 80% Moderate Assist   15 - 19 CK 40 - 60% Moderate Assist   20 - 22 CJ 20 - 40% Minimal Assist   23 CI 1-20% SBA / CGA   24 CH 0% Independent/ Mod I     Patient left sitting on couch with all lines intact.        Rehab identified problem list/impairments: Rehab identified problem list/impairments: impaired endurance, impaired self care skills, impaired balance, decreased coordination    Rehab potential is good.    Activity tolerance: Good    Discharge recommendations: Discharge Facility/Level Of Care Needs: home health PT     Barriers to discharge: Barriers to Discharge: None    Equipment recommendations: Equipment Needed After Discharge: bath bench     GOALS:    Physical Therapy Goals        Problem: Physical Therapy Goal    Goal Priority Disciplines Outcome Goal Variances Interventions   Physical Therapy Goal     PT/OT, PT Ongoing (interventions implemented as appropriate)     Description:  LTG'S TO BE MET IN 7 DAYS (6-8-17)  1. PT WILL REQUIRE SBA FOR BED MOBILITY  2. PT WILL REQUIRE SPV FOR TF'S  3. PT WILL ' WITH SPV  4. PT WILL DEMO G DYNAMIC BALANCE DURING GAIT  5. PT WILL TOLERATE BLE THEREX X 20 REPS AROM                     PLAN:    Patient to be seen daily  to address the above listed problems via therapeutic exercises, therapeutic activities, gait training  Plan of Care expires: 06/08/17  Plan of Care reviewed with: patient         Du Pan, PT  06/03/2017

## 2017-06-03 NOTE — PROGRESS NOTES
[facility] Hospital Medicine  Progress Note    Patient Name: Sukhjinder Kim  MRN: [id number]  Patient Class: IP- Inpatient   Admission Date: 5/31/2017  Length of Stay: 3 days  Attending Physician: Gerardo Hernandez MD  Primary Care Provider: Primary Doctor No        Subjective:     Principal Problem:Atrial fibrillation with RVR    HPI:  Sukhjinder Kim is a 56 year old male with history of Hypertension, Tobacco abuse, ETOH use, and Aortic Stenosis who underwent aortic valve replacement today performed by Dr. Hernandez.  Patient is currently extubated and awake requesting pain medications. Hospital Medicine has been consulted for medical management.    Hospital Course:  No notes on file    Interval History: 57 yo male with AVR who is doing well except for soreness of chest wall.    Review of Systems   Constitutional: Negative.    HENT: Negative.    Respiratory: Negative.    Cardiovascular:        Soreness   Gastrointestinal: Negative.    Musculoskeletal: Negative.    Skin: Negative.    Hematological: Negative.    Psychiatric/Behavioral: Negative.      Objective:     Vital Signs (Most Recent):  Temp: 97.8 °F (36.6 °C) (06/03/17 0753)  Pulse: 75 (06/03/17 0753)  Resp: 19 (06/03/17 0753)  BP: (!) 137/94 (06/03/17 0753)  SpO2: 96 % (06/03/17 0800) Vital Signs (24h Range):  Temp:  [97.8 °F (36.6 °C)-98.8 °F (37.1 °C)] 97.8 °F (36.6 °C)  Pulse:  [68-81] 75  Resp:  [19-24] 19  SpO2:  [96 %-99 %] 96 %  BP: (122-155)/(57-94) 137/94     Weight: 118.8 kg (261 lb 14.5 oz)  Body mass index is 38.68 kg/m².    Intake/Output Summary (Last 24 hours) at 06/03/17 1124  Last data filed at 06/03/17 0501   Gross per 24 hour   Intake           972.52 ml   Output             1420 ml   Net          -447.48 ml      Physical Exam   Constitutional: He is oriented to person, place, and time. He appears well-developed and well-nourished.   HENT:   Head: Normocephalic and atraumatic.   Nose: Nose normal.   Mouth/Throat: Oropharynx is  clear and moist.   Eyes: EOM are normal. Pupils are equal, round, and reactive to light.   Neck: Normal range of motion. Neck supple.   Cardiovascular: Normal rate, regular rhythm, normal heart sounds and intact distal pulses.    Surgical sternal scar   Pulmonary/Chest: Effort normal and breath sounds normal.   Abdominal: Soft. Bowel sounds are normal.   Musculoskeletal: Normal range of motion.   Neurological: He is alert and oriented to person, place, and time.   Skin: Skin is warm and dry. Capillary refill takes less than 2 seconds.   Psychiatric: He has a normal mood and affect.   Nursing note and vitals reviewed.      Significant Labs:   Recent Lab Results       06/03/17  0436 06/02/17  2239 06/02/17  1728      Anion Gap 10       BUN, Bld 17       Calcium 8.7       Chloride 95       CO2 25       Creatinine 0.7       eGFR if  >60       eGFR if non  >60  Comment:  Calculation used to obtain the estimated glomerular filtration  rate (eGFR) is the CKD-EPI equation. Since race is unknown   in our information system, the eGFR values for   -American and Non--American patients are given   for each creatinine result.         Glucose 131(H)       Hematocrit 26.5(L)       Hemoglobin 9.0(L)       Magnesium 1.9       MCH 30.8       MCHC 34.0       MCV 91       MPV 9.6       Platelets 154       POCT Glucose  181(H) 162(H)     Potassium 4.7       RBC 2.92(L)       RDW 13.8       Sodium 130(L)       WBC 22.45(H)             Significant Imaging: I have reviewed and interpreted all pertinent imaging results/findings within the past 24 hours.    Assessment/Plan:      Leukocytosis    6/3/17: WBC has trended back up could be related to chest tube as he doesn't appear toxic.          Acute blood loss anemia    -from recent surgery.   -Monitor  -Transfuse if needed    6/2/17: secondary surgery and did get blood on yesterday, CVT will transfuse per recommendation.    6/3/17: currently will  hold on transfusion        S/P AVR (aortic valve replacement)              Essential hypertension    Patient requiring vasopressor support; weaning            Aortic stenosis, severe    S/p tissue valve replacement  Per Primary Team and Cardiology recommendations    6/1/17: s/p tissue valve replacement overall doing well just sore    6/2/17: s/p AVR doing well still with some soreness.              Alcohol use    -Drinks 2 beers daily.   -monitor for ETOH withdrawal  -MVI, thiamine          Tobacco use    Counseled  -nicotine         Cocaine abuse    UDS positive 4/2017  -denies recent use. Counseled.          Paroxysmal atrial fibrillation    6/3/17: was placed on infusion and now started on oral medications.          * Atrial fibrillation with RVR    Developed RVR last night now has amiodarone started and being managed per cardiology.            VTE Risk Mitigation         Ordered     enoxaparin injection 40 mg  Every 24 hours (non-standard times)     Route:  Subcutaneous        05/31/17 1130     Medium Risk of VTE  Once      05/31/17 1130          Emiliana Murphy MD  Department of Hospital Medicine   [facility]

## 2017-06-03 NOTE — PROGRESS NOTES
S/P AVR (tissue) POD#3    Pt seen and examined. Doing well, without any issues overnight.  Ambulating.    In and out of afib.    CT's d/c'd today, left for extra day due to high output following surgery.  Incisions c/d/i.    Labs and CXR noted. CXR clear.  Monitor leukocytosis, pt is afebrile.    Plan:  1. Recheck labs in am.  2. Continue current therapy.  3. Home soon if ok with all teams.  4. Afib per Dr. Traore' recs.  5. Appreciate all consulting team's help.

## 2017-06-03 NOTE — PROGRESS NOTES
Cardiology Progress Note        SUBJECTIVE:     PT SEEN AND EXAMINED.  STILL HAS CHEST TUBE IN.  LABS STABLE, ANEMIA STABLE.  SEEMS OK  NO COMPLAINTS  AMBULATING      OBJECTIVE:     Vital Signs (Most Recent)  Temp: 97.8 °F (36.6 °C) (06/03/17 0753)  Pulse: 75 (06/03/17 0753)  Resp: 19 (06/03/17 0753)  BP: (!) 137/94 (06/03/17 0753)  SpO2: 96 % (06/03/17 0800)    Vital Signs Range (Last 24H):  Temp:  [97.8 °F (36.6 °C)-98.8 °F (37.1 °C)]   Pulse:  [68-81]   Resp:  [19-24]   BP: (122-155)/(57-94)   SpO2:  [96 %-99 %]     Intake/Output last 3 shifts:  I/O last 3 completed shifts:  In: 2664.5 [P.O.:2030; I.V.:534.5; IV Piggyback:100]  Out: 3168 [Urine:2718; Chest Tube:450]    Intake/Output this shift:  No intake/output data recorded.    Review of patient's allergies indicates:  No Known Allergies    Current Facility-Administered Medications   Medication    amiodarone tablet 400 mg    aspirin EC tablet 81 mg    clopidogrel tablet 75 mg    dextrose 50% injection 12.5 g    dextrose 50% injection 25 g    docusate sodium capsule 100 mg    enoxaparin injection 40 mg    famotidine tablet 20 mg    folic acid tablet 1 mg    furosemide injection 40 mg    glucagon (human recombinant) injection 1 mg    glucose chewable tablet 16 g    glucose chewable tablet 24 g    hydrALAZINE injection 20 mg    hydrocodone-acetaminophen 10-325mg per tablet 1 tablet    hydrocodone-acetaminophen 5-325mg per tablet 1 tablet    hydrocodone-acetaminophen 7.5-325mg per tablet 1 tablet    insulin aspart pen 1-10 Units    lactated ringers infusion    metoprolol tartrate (LOPRESSOR) tablet 25 mg    morphine injection 2 mg    multivitamin tablet 1 tablet    nicotine 21 mg/24 hr 1 patch    ondansetron injection 4 mg    pneumoc 13-damir conj-dip cr(PF) 0.5 mL    potassium chloride SA CR tablet 20 mEq    promethazine (PHENERGAN) 6.25 mg in dextrose 5 % 50 mL IVPB    thiamine tablet 100 mg     No current facility-administered  medications on file prior to encounter.      Current Outpatient Prescriptions on File Prior to Encounter   Medication Sig    aspirin (ECOTRIN) 81 MG EC tablet Take 1 tablet (81 mg total) by mouth once daily.    diltiaZEM (CARDIZEM CD) 240 MG 24 hr capsule Take 1 capsule (240 mg total) by mouth once daily. (Patient taking differently: Take 240 mg by mouth 2 (two) times daily. )       Physical Exam:  General appearance: alert, appears stated age and cooperative  Head: Normocephalic, without obvious abnormality, atraumatic  Eyes:  conjunctivae/corneas clear. EOM's intact.   Neck: no adenopathy, no carotid bruit, no JVD, supple  Lungs:  clear to auscultation bilaterally  Chest wall: sternotomy site healing well  Heart: regular rate and rhythm, S1, S2 normal, no murmur, click, rub or gallop  Abdomen: soft, non-tender; bowel sounds normal; no masses,  no organomegaly  Extremities: extremities normal, atraumatic, no cyanosis or edema  Pulses: 2+ and symmetric  Skin: Skin color, texture, turgor normal. No rashes or lesions  Neurologic: Grossly normal    Laboratory:  Chemistry:   Lab Results   Component Value Date     (L) 06/03/2017    K 4.7 06/03/2017    CL 95 06/03/2017    CO2 25 06/03/2017    BUN 17 06/03/2017    CREATININE 0.7 06/03/2017    CALCIUM 8.7 06/03/2017     Cardiac Markers:   Lab Results   Component Value Date    TROPONINI 0.042 (H) 04/06/2017     Cardiac Markers (Last 3):   Lab Results   Component Value Date    TROPONINI 0.042 (H) 04/06/2017    TROPONINI 0.038 (H) 04/06/2017    TROPONINI 0.043 (H) 04/06/2017     CBC:   Lab Results   Component Value Date    WBC 22.45 (H) 06/03/2017    HGB 9.0 (L) 06/03/2017    HCT 26.5 (L) 06/03/2017    HCT 25 (L) 05/31/2017    MCV 91 06/03/2017     06/03/2017     Lipids: No results found for: CHOL, TRIG, HDL, LDLDIRECT  Coagulation:   Lab Results   Component Value Date    INR 1.2 06/01/2017    APTT 30.6 05/31/2017       Diagnostic Results:  ECG: Reviewed  X-Ray:  Reviewed  US: Reviewed  CT: Reviewed  Echo: Reviewed      ASSESSMENT/PLAN:     Patient Active Problem List   Diagnosis    Paroxysmal atrial fibrillation    Cocaine abuse    Tobacco use    Alcohol use    Aortic stenosis, severe    Essential hypertension    Aortic stenosis    Aortic valve disease    S/P AVR (aortic valve replacement)    Acute hyperglycemia    Acute blood loss anemia    Atrial fibrillation with RVR    Leukocytosis        S/P AVR FOR SEVERE AS  POST OP A FIB, RESOLVED FOR NOW WITH AMIODARONE.  CONTINUE CURRENT MEDS  AMBULATE  SPIROMETRY  CHEST TUBE OUT WHEN OK WITH CVT

## 2017-06-03 NOTE — ASSESSMENT & PLAN NOTE
-from recent surgery.   -Monitor  -Transfuse if needed    6/2/17: secondary surgery and did get blood on yesterday, CVT will transfuse per recommendation.    6/3/17: currently will hold on transfusion

## 2017-06-03 NOTE — ASSESSMENT & PLAN NOTE
S/p tissue valve replacement  Per Primary Team and Cardiology recommendations    6/1/17: s/p tissue valve replacement overall doing well just sore    6/2/17: s/p AVR doing well still with some soreness.

## 2017-06-03 NOTE — SUBJECTIVE & OBJECTIVE
Interval History: 55 yo male with AVR who is doing well except for soreness of chest wall.    Review of Systems   Constitutional: Negative.    HENT: Negative.    Respiratory: Negative.    Cardiovascular:        Soreness   Gastrointestinal: Negative.    Musculoskeletal: Negative.    Skin: Negative.    Hematological: Negative.    Psychiatric/Behavioral: Negative.      Objective:     Vital Signs (Most Recent):  Temp: 97.8 °F (36.6 °C) (06/03/17 0753)  Pulse: 75 (06/03/17 0753)  Resp: 19 (06/03/17 0753)  BP: (!) 137/94 (06/03/17 0753)  SpO2: 96 % (06/03/17 0800) Vital Signs (24h Range):  Temp:  [97.8 °F (36.6 °C)-98.8 °F (37.1 °C)] 97.8 °F (36.6 °C)  Pulse:  [68-81] 75  Resp:  [19-24] 19  SpO2:  [96 %-99 %] 96 %  BP: (122-155)/(57-94) 137/94     Weight: 118.8 kg (261 lb 14.5 oz)  Body mass index is 38.68 kg/m².    Intake/Output Summary (Last 24 hours) at 06/03/17 1124  Last data filed at 06/03/17 0501   Gross per 24 hour   Intake           972.52 ml   Output             1420 ml   Net          -447.48 ml      Physical Exam   Constitutional: He is oriented to person, place, and time. He appears well-developed and well-nourished.   HENT:   Head: Normocephalic and atraumatic.   Nose: Nose normal.   Mouth/Throat: Oropharynx is clear and moist.   Eyes: EOM are normal. Pupils are equal, round, and reactive to light.   Neck: Normal range of motion. Neck supple.   Cardiovascular: Normal rate, regular rhythm, normal heart sounds and intact distal pulses.    Surgical sternal scar   Pulmonary/Chest: Effort normal and breath sounds normal.   Abdominal: Soft. Bowel sounds are normal.   Musculoskeletal: Normal range of motion.   Neurological: He is alert and oriented to person, place, and time.   Skin: Skin is warm and dry. Capillary refill takes less than 2 seconds.   Psychiatric: He has a normal mood and affect.   Nursing note and vitals reviewed.      Significant Labs:   Recent Lab Results       06/03/17  1576 06/02/17  223  06/02/17  1728      Anion Gap 10       BUN, Bld 17       Calcium 8.7       Chloride 95       CO2 25       Creatinine 0.7       eGFR if  >60       eGFR if non  >60  Comment:  Calculation used to obtain the estimated glomerular filtration  rate (eGFR) is the CKD-EPI equation. Since race is unknown   in our information system, the eGFR values for   -American and Non--American patients are given   for each creatinine result.         Glucose 131(H)       Hematocrit 26.5(L)       Hemoglobin 9.0(L)       Magnesium 1.9       MCH 30.8       MCHC 34.0       MCV 91       MPV 9.6       Platelets 154       POCT Glucose  181(H) 162(H)     Potassium 4.7       RBC 2.92(L)       RDW 13.8       Sodium 130(L)       WBC 22.45(H)             Significant Imaging: I have reviewed and interpreted all pertinent imaging results/findings within the past 24 hours.

## 2017-06-03 NOTE — PROGRESS NOTES
During report, no order found for pt CT suction. Pt currently to -40 wall suction. CVT notified, new order from Dr. Pickens for wall suction. Pt disconnected from suction and placed on water seal. Will continue to monitor.

## 2017-06-03 NOTE — PT/OT/SLP PROGRESS
Physical Therapy  Treatment    Sukhjinder Kim   MRN: [id number]   Admitting Diagnosis: Atrial fibrillation with RVR    PT Received On: 06/03/17  PT Start Time: 0857     PT Stop Time: 0920    PT Total Time (min): 23 min       Billable Minutes:  Gait [id number], Therapeutic Activity 5 and Therapeutic Exercise 8    Treatment Type: Treatment  PT/PTA: PTA     PTA Visit Number: 1       General Precautions: Standard, sternal, fall, respiratory  Orthopedic Precautions: N/A   Braces:           Subjective:  Communicated with epic and nurse Pau CALLOWAY prior to session.      Pain/Comfort  Pain Rating 1: 0/10  Pain Rating Post-Intervention 2: 0/10    Objective:   Patient found with: chest tube, oxygen, telemetry    Functional Mobility:  Bed Mobility:        Transfers:  Sit <> Stand Assistance: Contact Guard Assistance  Sit <> Stand Assistive Device: No Assistive Device    Gait:   Gait Distance: Pt amb 220' CGA no LOB or SOB  Assistance 1: Contact Guard Assistance  Gait Assistive Device: No device  Gait Pattern: swing-through gait    Stairs:      Balance:   Static Sit: FAIR+: Able to take MINIMAL challenges from all directions  Dynamic Sit: FAIR: Cannot move trunk without losing balance  Static Stand: FAIR: Maintains without assist but unable to take challenges  Dynamic stand: FAIR: Needs CONTACT GUARD during gait     Therapeutic Activities and Exercises:  Pt ambulated in cowan for 220 feet. Pt performed standing balance for ~3 minutes f/b seated exercises.      AM-PAC 6 CLICK MOBILITY  How much help from another person does this patient currently need?   1 = Unable, Total/Dependent Assistance  2 = A lot, Maximum/Moderate Assistance  3 = A little, Minimum/Contact Guard/Supervision  4 = None, Modified [place]/Independent    Turning over in bed (including adjusting bedclothes, sheets and blankets)?: 3  Sitting down on and standing up from a chair with arms (e.g., wheelchair, bedside commode, etc.): 4  Moving from lying on back to  sitting on the side of the bed?: 3  Moving to and from a bed to a chair (including a wheelchair)?: 3  Need to walk in hospital room?: 3  Climbing 3-5 steps with a railing?: 1 (NT)  Total Score: 17    AM-PAC Raw Score CMS G-Code Modifier Level of Impairment Assistance   6 % Total / Unable   7 - 9 CM 80 - 100% Maximal Assist   10 - 14 CL 60 - 80% Moderate Assist   15 - 19 CK 40 - 60% Moderate Assist   20 - 22 CJ 20 - 40% Minimal Assist   23 CI 1-20% SBA / CGA   24 CH 0% Independent/ Mod I     Patient left sitting on couch with all lines intact, call button in reach and brother present.      Rehab identified problem list/impairments: Rehab identified problem list/impairments: impaired endurance, weakness, impaired self care skills, impaired balance, decreased coordination    Rehab potential is good.    Activity tolerance: Good    Discharge recommendations: Discharge Facility/Level Of Care Needs: home health PT     Barriers to discharge: Barriers to Discharge: None    Equipment recommendations: Equipment Needed After Discharge: bath bench     GOALS:    Physical Therapy Goals        Problem: Physical Therapy Goal    Goal Priority Disciplines Outcome Goal Variances Interventions   Physical Therapy Goal     PT/OT, PT Ongoing (interventions implemented as appropriate)     Description:  LTG'S TO BE MET IN 7 DAYS (6-8-17)  1. PT WILL REQUIRE SBA FOR BED MOBILITY  2. PT WILL REQUIRE SPV FOR TF'S  3. PT WILL ' WITH SPV  4. PT WILL DEMO G DYNAMIC BALANCE DURING GAIT  5. PT WILL TOLERATE BLE THEREX X 20 REPS AROM                     PLAN:    Patient to be seen BID  to address the above listed problems via therapeutic exercises, therapeutic activities, gait training  Plan of Care expires: 06/08/17  Plan of Care reviewed with: patient         Du Pan, PT  06/03/2017

## 2017-06-03 NOTE — PROGRESS NOTES
Chest tubes have been removed by MD. Patient tolerated well and is extremely happy to no longer have the chest tubes in place.

## 2017-06-04 LAB
ABO + RH BLD: NORMAL
BASOPHILS # BLD AUTO: 0.07 K/UL
BASOPHILS NFR BLD: 0.6 %
BLD GP AB SCN CELLS X3 SERPL QL: NORMAL
BLD PROD TYP BPU: NORMAL
BLOOD UNIT EXPIRATION DATE: NORMAL
BLOOD UNIT TYPE CODE: 9500
BLOOD UNIT TYPE: NORMAL
CODING SYSTEM: NORMAL
DIFFERENTIAL METHOD: ABNORMAL
DISPENSE STATUS: NORMAL
EOSINOPHIL # BLD AUTO: 0.1 K/UL
EOSINOPHIL NFR BLD: 0.6 %
ERYTHROCYTE [DISTWIDTH] IN BLOOD BY AUTOMATED COUNT: 13.5 %
HCT VFR BLD AUTO: 21.2 %
HGB BLD-MCNC: 7.6 G/DL
LYMPHOCYTES # BLD AUTO: 2.3 K/UL
LYMPHOCYTES NFR BLD: 17.8 %
MCH RBC QN AUTO: 32.1 PG
MCHC RBC AUTO-ENTMCNC: 35.8 %
MCV RBC AUTO: 90 FL
MONOCYTES # BLD AUTO: 1.8 K/UL
MONOCYTES NFR BLD: 13.8 %
NEUTROPHILS # BLD AUTO: 8.6 K/UL
NEUTROPHILS NFR BLD: 67.2 %
NUM UNITS TRANS PACKED RBC: NORMAL
PLATELET # BLD AUTO: 190 K/UL
PMV BLD AUTO: 9.1 FL
POCT GLUCOSE: 117 MG/DL (ref 70–110)
POCT GLUCOSE: 139 MG/DL (ref 70–110)
POCT GLUCOSE: 144 MG/DL (ref 70–110)
POCT GLUCOSE: 150 MG/DL (ref 70–110)
RBC # BLD AUTO: 2.37 M/UL
WBC # BLD AUTO: 12.72 K/UL

## 2017-06-04 PROCEDURE — 97110 THERAPEUTIC EXERCISES: CPT

## 2017-06-04 PROCEDURE — 25000003 PHARM REV CODE 250: Performed by: INTERNAL MEDICINE

## 2017-06-04 PROCEDURE — 25000003 PHARM REV CODE 250: Performed by: PHYSICIAN ASSISTANT

## 2017-06-04 PROCEDURE — 85025 COMPLETE CBC W/AUTO DIFF WBC: CPT

## 2017-06-04 PROCEDURE — 25000003 PHARM REV CODE 250: Performed by: NURSE PRACTITIONER

## 2017-06-04 PROCEDURE — 86901 BLOOD TYPING SEROLOGIC RH(D): CPT

## 2017-06-04 PROCEDURE — 93005 ELECTROCARDIOGRAM TRACING: CPT

## 2017-06-04 PROCEDURE — 97116 GAIT TRAINING THERAPY: CPT

## 2017-06-04 PROCEDURE — 93010 ELECTROCARDIOGRAM REPORT: CPT | Mod: ,,, | Performed by: INTERNAL MEDICINE

## 2017-06-04 PROCEDURE — 99233 SBSQ HOSP IP/OBS HIGH 50: CPT | Mod: ,,, | Performed by: INTERNAL MEDICINE

## 2017-06-04 PROCEDURE — 63600175 PHARM REV CODE 636 W HCPCS: Performed by: THORACIC SURGERY (CARDIOTHORACIC VASCULAR SURGERY)

## 2017-06-04 PROCEDURE — 36415 COLL VENOUS BLD VENIPUNCTURE: CPT

## 2017-06-04 PROCEDURE — 25000003 PHARM REV CODE 250: Performed by: THORACIC SURGERY (CARDIOTHORACIC VASCULAR SURGERY)

## 2017-06-04 PROCEDURE — 21400001 HC TELEMETRY ROOM

## 2017-06-04 PROCEDURE — 96372 THER/PROPH/DIAG INJ SC/IM: CPT

## 2017-06-04 PROCEDURE — P9016 RBC LEUKOCYTES REDUCED: HCPCS

## 2017-06-04 PROCEDURE — 63600175 PHARM REV CODE 636 W HCPCS: Performed by: PHYSICIAN ASSISTANT

## 2017-06-04 PROCEDURE — 86900 BLOOD TYPING SEROLOGIC ABO: CPT

## 2017-06-04 PROCEDURE — 63600175 PHARM REV CODE 636 W HCPCS: Performed by: INTERNAL MEDICINE

## 2017-06-04 PROCEDURE — 86920 COMPATIBILITY TEST SPIN: CPT

## 2017-06-04 RX ORDER — FUROSEMIDE 10 MG/ML
20 INJECTION INTRAMUSCULAR; INTRAVENOUS ONCE
Status: COMPLETED | OUTPATIENT
Start: 2017-06-04 | End: 2017-06-04

## 2017-06-04 RX ORDER — HYDROCODONE BITARTRATE AND ACETAMINOPHEN 500; 5 MG/1; MG/1
TABLET ORAL
Status: DISCONTINUED | OUTPATIENT
Start: 2017-06-04 | End: 2017-06-06 | Stop reason: HOSPADM

## 2017-06-04 RX ORDER — FUROSEMIDE 10 MG/ML
20 INJECTION INTRAMUSCULAR; INTRAVENOUS ONCE
Status: DISCONTINUED | OUTPATIENT
Start: 2017-06-04 | End: 2017-06-04

## 2017-06-04 RX ORDER — FUROSEMIDE 10 MG/ML
40 INJECTION INTRAMUSCULAR; INTRAVENOUS DAILY
Status: DISCONTINUED | OUTPATIENT
Start: 2017-06-05 | End: 2017-06-06 | Stop reason: HOSPADM

## 2017-06-04 RX ORDER — POTASSIUM CHLORIDE 20 MEQ/1
20 TABLET, EXTENDED RELEASE ORAL DAILY
Status: DISCONTINUED | OUTPATIENT
Start: 2017-06-05 | End: 2017-06-06 | Stop reason: HOSPADM

## 2017-06-04 RX ADMIN — ASPIRIN 81 MG: 81 TABLET, COATED ORAL at 08:06

## 2017-06-04 RX ADMIN — METOPROLOL TARTRATE 25 MG: 25 TABLET ORAL at 08:06

## 2017-06-04 RX ADMIN — HYDROCODONE BITARTRATE AND ACETAMINOPHEN 1 TABLET: 7.5; 325 TABLET ORAL at 06:06

## 2017-06-04 RX ADMIN — FOLIC ACID 1 MG: 1 TABLET ORAL at 08:06

## 2017-06-04 RX ADMIN — CLOPIDOGREL BISULFATE 75 MG: 75 TABLET ORAL at 08:06

## 2017-06-04 RX ADMIN — HYDROCODONE BITARTRATE AND ACETAMINOPHEN 1 TABLET: 5; 325 TABLET ORAL at 11:06

## 2017-06-04 RX ADMIN — Medication 100 MG: at 08:06

## 2017-06-04 RX ADMIN — FUROSEMIDE 20 MG: 10 INJECTION, SOLUTION INTRAMUSCULAR; INTRAVENOUS at 09:06

## 2017-06-04 RX ADMIN — THERA TABS 1 TABLET: TAB at 08:06

## 2017-06-04 RX ADMIN — ENOXAPARIN SODIUM 40 MG: 100 INJECTION SUBCUTANEOUS at 04:06

## 2017-06-04 RX ADMIN — FAMOTIDINE 20 MG: 20 TABLET ORAL at 08:06

## 2017-06-04 RX ADMIN — DOCUSATE SODIUM 100 MG: 100 CAPSULE, LIQUID FILLED ORAL at 08:06

## 2017-06-04 RX ADMIN — NICOTINE 1 PATCH: 21 PATCH, EXTENDED RELEASE TRANSDERMAL at 08:06

## 2017-06-04 RX ADMIN — POTASSIUM CHLORIDE 20 MEQ: 1500 TABLET, EXTENDED RELEASE ORAL at 08:06

## 2017-06-04 RX ADMIN — FUROSEMIDE 20 MG: 10 INJECTION, SOLUTION INTRAMUSCULAR; INTRAVENOUS at 04:06

## 2017-06-04 RX ADMIN — HYDROCODONE BITARTRATE AND ACETAMINOPHEN 1 TABLET: 5; 325 TABLET ORAL at 02:06

## 2017-06-04 RX ADMIN — AMIODARONE HYDROCHLORIDE 400 MG: 200 TABLET ORAL at 08:06

## 2017-06-04 NOTE — PT/OT/SLP PROGRESS
Physical Therapy  Treatment    Sukhjinder Kim   MRN: [id number]   Admitting Diagnosis: Atrial fibrillation with RVR    PT Received On: 06/04/17  PT Start Time: 0800     PT Stop Time: 0812    PT Total Time (min): 12 min       Billable Minutes:  Gait [id number]    Treatment Type: Treatment  PT/PTA: PTA     PTA Visit Number: 2       General Precautions: Standard, sternal, fall, respiratory  Orthopedic Precautions: N/A   Braces:           Subjective:  Communicated with epic and nurse Jasmin prior to session.      Pain/Comfort  Pain Rating 1: 0/10  Pain Rating Post-Intervention 2: 0/10    Objective:   Patient found with: telemetry    Functional Mobility:  Bed Mobility:   Rolling/Turning to Left: Contact guard assistance  Rolling/Turning Right: Contact guard assistance  Scooting/Bridging: Contact Guard Assistance  Supine to Sit: Contact Guard Assistance  Sit to Supine: Contact Guard Assistance    Transfers:  Sit <> Stand Assistance: Contact Guard Assistance  Sit <> Stand Assistive Device: No Assistive Device    Gait:   Gait Distance: Pt amb 220' in cowan  Assistance 1: Contact Guard Assistance  Gait Assistive Device: No device  Gait Pattern: swing-through gait    Stairs:      Balance:   Static Sit: FAIR+: Able to take MINIMAL challenges from all directions  Dynamic Sit: GOOD-: Maintains balance through MODERATE excursions of active trunk movement,     Static Stand: FAIR+: Takes MINIMAL challenges from all directions  Dynamic stand: FAIR: Needs CONTACT GUARD during gait     Therapeutic Activities and Exercises:  Pt ambulated in cowan f/b seated exercises.      AM-PAC 6 CLICK MOBILITY  How much help from another person does this patient currently need?   1 = Unable, Total/Dependent Assistance  2 = A lot, Maximum/Moderate Assistance  3 = A little, Minimum/Contact Guard/Supervision  4 = None, Modified [place]/Independent    Turning over in bed (including adjusting bedclothes, sheets and blankets)?: 4  Sitting down on and  standing up from a chair with arms (e.g., wheelchair, bedside commode, etc.): 4  Moving from lying on back to sitting on the side of the bed?: 4  Moving to and from a bed to a chair (including a wheelchair)?: 4  Need to walk in hospital room?: 4  Climbing 3-5 steps with a railing?: 1 (NT)  Total Score: 21    AM-PAC Raw Score CMS G-Code Modifier Level of Impairment Assistance   6 % Total / Unable   7 - 9 CM 80 - 100% Maximal Assist   10 - 14 CL 60 - 80% Moderate Assist   15 - 19 CK 40 - 60% Moderate Assist   20 - 22 CJ 20 - 40% Minimal Assist   23 CI 1-20% SBA / CGA   24 CH 0% Independent/ Mod I     Patient left supine with all lines intact and call button in reach.    [place]se    Rehab identified problem list/impairments: Rehab identified problem list/impairments: impaired endurance    Rehab potential is good.    Activity tolerance: Good    Discharge recommendations: Discharge Facility/Level Of Care Needs: home health PT     Barriers to discharge: Barriers to Discharge: None    Equipment recommendations: Equipment Needed After Discharge: bath bench     GOALS:    Physical Therapy Goals        Problem: Physical Therapy Goal    Goal Priority Disciplines Outcome Goal Variances Interventions   Physical Therapy Goal     PT/OT, PT Ongoing (interventions implemented as appropriate)     Description:  LTG'S TO BE MET IN 7 DAYS (6-8-17)  1. PT WILL REQUIRE SBA FOR BED MOBILITY  2. PT WILL REQUIRE SPV FOR TF'S  3. PT WILL ' WITH SPV  4. PT WILL DEMO G DYNAMIC BALANCE DURING GAIT  5. PT WILL TOLERATE BLE THEREX X 20 REPS AROM                     PLAN:    Patient to be seen BID  to address the above listed problems via therapeutic exercises, therapeutic activities, gait training  Plan of Care expires: 06/08/17  Plan of Care reviewed with: patient         Du Maxim, PT  06/04/2017

## 2017-06-04 NOTE — NURSING
2nd unit of PRBC, [id number], O negative blood verified with [place] charge nurse.  PRBC started at 125 ml/hr.  Start and 15minute vitals charted within the blood admin flow sheet.  Pt. Has no complaints.  Will continue to monitor.

## 2017-06-04 NOTE — SUBJECTIVE & OBJECTIVE
Interval History:    Review of Systems   Constitutional: Negative.    HENT: Negative.    Respiratory: Negative.    Gastrointestinal: Negative.    Musculoskeletal: Negative.         Chest soreness     Skin: Negative.    Hematological: Negative.    Psychiatric/Behavioral: Negative.       Objective:     Vital Signs (Most Recent):  Temp: 98.5 °F (36.9 °C) (06/04/17 0732)  Pulse: 80 (06/04/17 0806)  Resp: 18 (06/04/17 0806)  BP: 133/72 (06/04/17 0732)  SpO2: 97 % (06/04/17 0806) Vital Signs (24h Range):  Temp:  [97.8 °F (36.6 °C)-98.5 °F (36.9 °C)] 98.5 °F (36.9 °C)  Pulse:  [63-80] 80  Resp:  [18-20] 18  SpO2:  [97 %-100 %] 97 %  BP: (116-162)/(58-84) 133/72     Weight: 118.3 kg (260 lb 12.9 oz)  Body mass index is 38.51 kg/m².    Intake/Output Summary (Last 24 hours) at 06/04/17 1050  Last data filed at 06/04/17 0411   Gross per 24 hour   Intake              840 ml   Output             2200 ml   Net            -1360 ml      Physical Exam   Constitutional: He is oriented to person, place, and time. He appears well-developed and well-nourished.   HENT:   Head: Normocephalic and atraumatic.   Nose: Nose normal.   Mouth/Throat: Oropharynx is clear and moist.   Eyes: EOM are normal. Pupils are equal, round, and reactive to light.   Neck: Normal range of motion. Neck supple.   Cardiovascular: Normal rate, regular rhythm, normal heart sounds and intact distal pulses.    Surgical sternal scar   Pulmonary/Chest: Effort normal and breath sounds normal.   Abdominal: Soft. Bowel sounds are normal.   Musculoskeletal: Normal range of motion.   Neurological: He is alert and oriented to person, place, and time.   Skin: Skin is warm and dry. Capillary refill takes less than 2 seconds.   Psychiatric: He has a normal mood and affect.   Nursing note and vitals reviewed.    Significant Labs:   CBC:   Recent Labs  Lab 06/03/17  0436 06/04/17  0553   WBC 22.45* 12.72*   HGB 9.0* 7.6*   HCT 26.5* 21.2*    190     CMP:   Recent  Labs  Lab 06/03/17  0436   *   K 4.7   CL 95   CO2 25   *   BUN 17   CREATININE 0.7   CALCIUM 8.7   ANIONGAP 10   EGFRNONAA >60       Significant Imaging:   Imaging Results          X-Ray Chest AP Portable (Final result)  Result time 06/03/17 08:07:26    Final result by Nikia Fernandez Jr., MD (06/03/17 08:07:26)                 Impression:          Developing atelectasis and/or scarring in the left lung.      Electronically signed by: NIKIA FERNANDEZ MD  Date:     06/03/17  Time:    08:07              Narrative:    EXAM:   XR CHEST AP PORTABLE    CLINICAL HISTORY:  Post-op postprocedural examination evaluation    COMPARISON:  06/01/2013    FINDINGS:   [place]-Sudha catheter has been removed.  Sternotomy wires remain.  Cardiac monitor leads are present.  Drainage catheter projects over the upper abdomen/right lower hemithorax.    No pneumothorax.  Presumed scarring and/or atelectasis in the left lung, similar to before.                             X-Ray Chest AP Portable (Final result)  Result time 06/01/17 08:40:54    Final result by Lisy Salvador MD (06/01/17 08:40:54)                 Impression:     As above.            Electronically signed by: LISY SALVADOR MD  Date:     06/01/17  Time:    08:40              Narrative:    Exam: XR CHEST AP PORTABLE    Clinical History: Respiratory distress. Post-op    Findings:     Interval extubation and removal of the NGT.  Stable position of a [place]-Sudha catheter.  No pneumothorax.  Otherwise no change from prior on May 31, 2017.                             X-Ray Chest AP Portable (Final result)  Result time 05/31/17 12:19:24    Final result by Alexia Liriano MD (05/31/17 12:19:24)                 Impression:     Stable postop chest. Left basilar opacity as above.      Electronically signed by: ALEXIA LIRIANO MD  Date:     05/31/17  Time:    12:19              Narrative:    History: Postop    Median sternotomy. Normal heart size. Endotracheal tube, NG tube, and  [place]-Sudha catheter in place. Right lung is clear. There is opacity left base consistent with atelectasis and/or pleural effusion.

## 2017-06-04 NOTE — PROGRESS NOTES
[facility] Hospital Medicine  Progress Note    Patient Name: Sukhjinder Kim  MRN: [id number]  Patient Class: IP- Inpatient   Admission Date: 5/31/2017  Length of Stay: 4 days  Attending Physician: Gerardo Hernandez MD  Primary Care Provider: Primary Doctor No    Subjective:     Principal Problem:Atrial fibrillation with RVR    HPI:  Sukhjinder Kim is a 56 year old male with history of Hypertension, Tobacco abuse, ETOH use, and Aortic Stenosis who underwent aortic valve replacement today performed by Dr. Hernandez.  Patient is currently extubated and awake requesting pain medications. Hospital Medicine has been consulted for medical management.    Hospital Course:  No notes on file    Interval History:    Review of Systems   Constitutional: Negative.    HENT: Negative.    Respiratory: Negative.    Gastrointestinal: Negative.    Musculoskeletal: Negative.         Chest soreness     Skin: Negative.    Hematological: Negative.    Psychiatric/Behavioral: Negative.       Objective:     Vital Signs (Most Recent):  Temp: 98.5 °F (36.9 °C) (06/04/17 0732)  Pulse: 80 (06/04/17 0806)  Resp: 18 (06/04/17 0806)  BP: 133/72 (06/04/17 0732)  SpO2: 97 % (06/04/17 0806) Vital Signs (24h Range):  Temp:  [97.8 °F (36.6 °C)-98.5 °F (36.9 °C)] 98.5 °F (36.9 °C)  Pulse:  [63-80] 80  Resp:  [18-20] 18  SpO2:  [97 %-100 %] 97 %  BP: (116-162)/(58-84) 133/72     Weight: 118.3 kg (260 lb 12.9 oz)  Body mass index is 38.51 kg/m².    Intake/Output Summary (Last 24 hours) at 06/04/17 1050  Last data filed at 06/04/17 0411   Gross per 24 hour   Intake              840 ml   Output             2200 ml   Net            -1360 ml      Physical Exam   Constitutional: He is oriented to person, place, and time. He appears well-developed and well-nourished.   HENT:   Head: Normocephalic and atraumatic.   Nose: Nose normal.   Mouth/Throat: Oropharynx is clear and moist.   Eyes: EOM are normal. Pupils are equal, round, and reactive to light.    Neck: Normal range of motion. Neck supple.   Cardiovascular: Normal rate, regular rhythm, normal heart sounds and intact distal pulses.    Surgical sternal scar   Pulmonary/Chest: Effort normal and breath sounds normal.   Abdominal: Soft. Bowel sounds are normal.   Musculoskeletal: Normal range of motion.   Neurological: He is alert and oriented to person, place, and time.   Skin: Skin is warm and dry. Capillary refill takes less than 2 seconds.   Psychiatric: He has a normal mood and affect.   Nursing note and vitals reviewed.    Significant Labs:   CBC:   Recent Labs  Lab 06/03/17 0436 06/04/17  0553   WBC 22.45* 12.72*   HGB 9.0* 7.6*   HCT 26.5* 21.2*    190     CMP:   Recent Labs  Lab 06/03/17 0436   *   K 4.7   CL 95   CO2 25   *   BUN 17   CREATININE 0.7   CALCIUM 8.7   ANIONGAP 10   EGFRNONAA >60       Significant Imaging:   Imaging Results          X-Ray Chest AP Portable (Final result)  Result time 06/03/17 08:07:26    Final result by Nikia Joseph Jr., MD (06/03/17 08:07:26)                 Impression:          Developing atelectasis and/or scarring in the left lung.      Electronically signed by: NIKIA JOSEPH MD  Date:     06/03/17  Time:    08:07              Narrative:    EXAM:   XR CHEST AP PORTABLE    CLINICAL HISTORY:  Post-op postprocedural examination evaluation    COMPARISON:  06/01/2013    FINDINGS:   [place]-Sudha catheter has been removed.  Sternotomy wires remain.  Cardiac monitor leads are present.  Drainage catheter projects over the upper abdomen/right lower hemithorax.    No pneumothorax.  Presumed scarring and/or atelectasis in the left lung, similar to before.                             X-Ray Chest AP Portable (Final result)  Result time 06/01/17 08:40:54    Final result by Lisy Wilkins MD (06/01/17 08:40:54)                 Impression:     As above.            Electronically signed by: LISY WILKINS MD  Date:     06/01/17  Time:    08:40               Narrative:    Exam: XR CHEST AP PORTABLE    Clinical History: Respiratory distress. Post-op    Findings:     Interval extubation and removal of the NGT.  Stable position of a [place]-Sudha catheter.  No pneumothorax.  Otherwise no change from prior on May 31, 2017.                             X-Ray Chest AP Portable (Final result)  Result time 05/31/17 12:19:24    Final result by Nixon Bower MD (05/31/17 12:19:24)                 Impression:     Stable postop chest. Left basilar opacity as above.      Electronically signed by: NIXON BOWER MD  Date:     05/31/17  Time:    12:19              Narrative:    History: Postop    Median sternotomy. Normal heart size. Endotracheal tube, NG tube, and [place]-Sudha catheter in place. Right lung is clear. There is opacity left base consistent with atelectasis and/or pleural effusion.                                Assessment/Plan:      Leukocytosis    6/3/17: WBC has trended back up could be related to chest tube as he doesn't appear toxic.    6/4/17: trending down; likely reactive, no signs of infection          Acute blood loss anemia    -from recent surgery.   -Monitor  -Transfuse if needed    6/2/17: secondary surgery and did get blood on yesterday, CVT will transfuse per recommendation.    6/3/17: currently will hold on transfusion    6/4/17; transfuse two units today per Cardiology        S/P AVR (aortic valve replacement)              Essential hypertension    BP controlled; continue current meds              Aortic stenosis, severe    S/p tissue valve replacement  Per Primary Team and Cardiology recommendations    6/1/17: s/p tissue valve replacement overall doing well just sore    6/2/17: s/p AVR doing well still with some soreness.    6/4/17. Doing well. Ambulating with physical therapy              Alcohol use    -Drinks 2 beers daily.   -monitor for ETOH withdrawal  -MVI, thiamine          Tobacco use    Counseled  -nicotine patch        Cocaine abuse    UDS  positive 4/2017  -denies recent use. Counseled.          Paroxysmal atrial fibrillation    6/3/17: was placed on infusion and now started on oral medications     6/4/17: rate controlled on Amiodarone and Metoprolol          * Atrial fibrillation with RVR    Developed RVR 6/3/7 now has amiodarone started and being managed per cardiology.    6/4/17: HR controlled with Amiodarone and Metoprolol                VTE Risk Mitigation         Ordered     enoxaparin injection 40 mg  Every 24 hours (non-standard times)     Route:  Subcutaneous        05/31/17 1130     Medium Risk of VTE  Once      05/31/17 1130        Will sign off; re-consult if needed    Kerri Quezada NP  Department of Hospital Medicine   [facility]

## 2017-06-04 NOTE — ASSESSMENT & PLAN NOTE
6/3/17: WBC has trended back up could be related to chest tube as he doesn't appear toxic.    6/4/17: trending down; likely reactive, no signs of infection

## 2017-06-04 NOTE — PROGRESS NOTES
S/P AVR (tissue) POD#4     Pt seen and examined. Doing well, without any issues overnight.  Ambulating.    Incisions c/d/i.  CBC noted.     Plan:  1. Transfuse two units of blood today.  2. Recheck labs in am.  3. Home soon if ok with all teams.  4. Diuresis.  5. Appreciate all consulting team's help.

## 2017-06-04 NOTE — ASSESSMENT & PLAN NOTE
S/p tissue valve replacement  Per Primary Team and Cardiology recommendations    6/1/17: s/p tissue valve replacement overall doing well just sore    6/2/17: s/p AVR doing well still with some soreness.    6/4/17. Doing well. Ambulating with physical therapy

## 2017-06-04 NOTE — ASSESSMENT & PLAN NOTE
-from recent surgery.   -Monitor  -Transfuse if needed    6/2/17: secondary surgery and did get blood on yesterday, CVT will transfuse per recommendation.    6/3/17: currently will hold on transfusion    6/4/17; transfuse two units today per Cardiology

## 2017-06-04 NOTE — PROGRESS NOTES
Patient ambulated in hallway to elevators and back to room with standby assistance. No c/o pain. Tolerated well. Will continue to monitor.

## 2017-06-04 NOTE — PT/OT/SLP PROGRESS
Physical Therapy  Treatment    Sukhjinder Kim   MRN: [id number]   Admitting Diagnosis: Atrial fibrillation with RVR    PT Received On: 06/04/17  PT Start Time: 1020     PT Stop Time: 1044    PT Total Time (min): 24 min       Billable Minutes:  Gait [id number] and Therapeutic Exercise 12    Treatment Type: Treatment  PT/PTA: PTA     PTA Visit Number: 2       General Precautions: Standard, respiratory, sternal, fall  Orthopedic Precautions: N/A   Braces:           Subjective:  Communicated with epic and nurse Jasmin prior to session.      Pain/Comfort  Pain Rating 1: 0/10  Pain Rating Post-Intervention 1: 0/10  Pain Rating Post-Intervention 2: 0/10    Objective:   Patient found with: telemetry    Functional Mobility:  Bed Mobility:   Rolling/Turning to Left: Contact guard assistance  Rolling/Turning Right: Contact guard assistance  Scooting/Bridging: Contact Guard Assistance  Supine to Sit: Contact Guard Assistance  Sit to Supine: Contact Guard Assistance    Transfers:  Sit <> Stand Assistance: Contact Guard Assistance  Sit <> Stand Assistive Device: No Assistive Device    Gait:   Gait Distance: pt amb 220' x2 trials  Assistance 1: Contact Guard Assistance  Gait Assistive Device: No device  Gait Pattern: swing-through gait    Stairs:      Balance:   Static Sit: FAIR+: Able to take MINIMAL challenges from all directions  Dynamic Sit: FAIR+: Maintains balance through MINIMAL excursions of active trunk motion  Static Stand: FAIR+: Takes MINIMAL challenges from all directions  Dynamic stand: FAIR: Needs CONTACT GUARD during gait     Therapeutic Activities and Exercises:  Pt ambulated in [place]an for 220 feet x2 trials f/b seated exercises.     AM-PAC 6 CLICK MOBILITY  How much help from another person does this patient currently need?   1 = Unable, Total/Dependent Assistance  2 = A lot, Maximum/Moderate Assistance  3 = A little, Minimum/Contact Guard/Supervision  4 = None, Modified [place]/Independent    Turning over in bed  (including adjusting bedclothes, sheets and blankets)?: 4  Sitting down on and standing up from a chair with arms (e.g., wheelchair, bedside commode, etc.): 4  Moving from lying on back to sitting on the side of the bed?: 4  Moving to and from a bed to a chair (including a wheelchair)?: 4  Need to walk in hospital room?: 4  Climbing 3-5 steps with a railing?: 1 (NT)  Total Score: 21    AM-PAC Raw Score CMS G-Code Modifier Level of Impairment Assistance   6 % Total / Unable   7 - 9 CM 80 - 100% Maximal Assist   10 - 14 CL 60 - 80% Moderate Assist   15 - 19 CK 40 - 60% Moderate Assist   20 - 22 CJ 20 - 40% Minimal Assist   23 CI 1-20% SBA / CGA   24 CH 0% Independent/ Mod I     Patient left sitting EOB with all lines intact.      Rehab identified problem list/impairments: Rehab identified problem list/impairments: impaired endurance    Rehab potential is good.    Activity tolerance: Good    Discharge recommendations: Discharge Facility/Level Of Care Needs: home health PT     Barriers to discharge: Barriers to Discharge: None    Equipment recommendations: Equipment Needed After Discharge: bath bench     GOALS:    Physical Therapy Goals        Problem: Physical Therapy Goal    Goal Priority Disciplines Outcome Goal Variances Interventions   Physical Therapy Goal     PT/OT, PT Ongoing (interventions implemented as appropriate)     Description:  LTG'S TO BE MET IN 7 DAYS (6-8-17)  1. PT WILL REQUIRE SBA FOR BED MOBILITY  2. PT WILL REQUIRE SPV FOR TF'S  3. PT WILL ' WITH SPV  4. PT WILL DEMO G DYNAMIC BALANCE DURING GAIT  5. PT WILL TOLERATE BLE THEREX X 20 REPS AROM                     PLAN:    Patient to be seen BID  to address the above listed problems via therapeutic exercises, therapeutic activities, gait training  Plan of Care expires: 06/08/17  Plan of Care reviewed with: patient         Du Pan, PT  06/04/2017

## 2017-06-04 NOTE — NURSING
1st unit of PRBC started at 100ml/hr on patient in left AC #20.  Vitals taken and are charted on blood admin flow sheet.  Unit #[id number] O negative blood, receiving O negative blood.  Verified with Nidhi SPANN LPN.     Admin rate increased to 125 at 13:15 verified by Chyna SALGADO.  Pt. Denies any shortness of breath, chest pain, back pain, or any other complaints or discomfort.  Will continue to monitor and record vitals in blood admin flow sheet.      IT support called ([phone] to resolve issue with charting within the blood flow sheet.  Unable to resolve issue at this time.  [facility] called as well and was told to complete note and downtime report and retry to enter into the flowsheet later on.  Down time reports were unable to be found by charge nurse and nurse so recording is being done via nurse's note for now.

## 2017-06-04 NOTE — ASSESSMENT & PLAN NOTE
6/3/17: was placed on infusion and now started on oral medications     6/4/17: rate controlled on Amiodarone and Metoprolol

## 2017-06-04 NOTE — PROGRESS NOTES
Cardiology Progress Note        SUBJECTIVE:     PT SEEN AND EXAMINED.  SEVERE ANEMIA NOTED ON LABS, HG 7.6  RECEIVED PRBC EARLIER IN HOSPITAL STAY AFTER AVR  CHEST TUBE OUT  AMBULATING, OTHERWISE FEELS OK WITH NO CP OR DYSPNEA.  NO RECURRENT A FIB NOTED  ON AMIODARONE PO, STARTED AFTER AVR FOR A FIB RVR.      OBJECTIVE:     Vital Signs (Most Recent)  Temp: 98.5 °F (36.9 °C) (06/04/17 1400)  Pulse: 63 (06/04/17 1400)  Resp: 18 (06/04/17 1400)  BP: 130/72 (06/04/17 1400)  SpO2: 99 % (06/04/17 1400)    Vital Signs Range (Last 24H):  Temp:  [97.8 °F (36.6 °C)-99.1 °F (37.3 °C)]   Pulse:  [63-80]   Resp:  [18-20]   BP: (116-162)/(58-87)   SpO2:  [91 %-100 %]     Intake/Output last 3 shifts:  I/O last 3 completed shifts:  In: 1180.2 [P.O.:1080; I.V.:100.2]  Out: 3620 [Urine:3550; Chest Tube:70]    Intake/Output this shift:  I/O this shift:  In: 23.3 [Blood:23.3]  Out: -     Review of patient's allergies indicates:  No Known Allergies    Current Facility-Administered Medications   Medication    0.9%  NaCl infusion (for blood administration)    amiodarone tablet 400 mg    aspirin EC tablet 81 mg    clopidogrel tablet 75 mg    dextrose 50% injection 12.5 g    dextrose 50% injection 25 g    docusate sodium capsule 100 mg    enoxaparin injection 40 mg    famotidine tablet 20 mg    folic acid tablet 1 mg    furosemide injection 20 mg    furosemide injection 20 mg    [START ON 6/5/2017] furosemide injection 40 mg    glucagon (human recombinant) injection 1 mg    glucose chewable tablet 16 g    glucose chewable tablet 24 g    hydrALAZINE injection 20 mg    hydrocodone-acetaminophen 10-325mg per tablet 1 tablet    hydrocodone-acetaminophen 5-325mg per tablet 1 tablet    hydrocodone-acetaminophen 7.5-325mg per tablet 1 tablet    insulin aspart pen 1-10 Units    lactated ringers infusion    metoprolol tartrate (LOPRESSOR) tablet 25 mg    morphine injection 2 mg    multivitamin tablet 1 tablet    nicotine 21  mg/24 hr 1 patch    ondansetron injection 4 mg    pneumoc 13-damir conj-dip cr(PF) 0.5 mL    [START ON 6/5/2017] potassium chloride SA CR tablet 20 mEq    promethazine (PHENERGAN) 6.25 mg in dextrose 5 % 50 mL IVPB    thiamine tablet 100 mg     No current facility-administered medications on file prior to encounter.      Current Outpatient Prescriptions on File Prior to Encounter   Medication Sig    aspirin (ECOTRIN) 81 MG EC tablet Take 1 tablet (81 mg total) by mouth once daily.    diltiaZEM (CARDIZEM CD) 240 MG 24 hr capsule Take 1 capsule (240 mg total) by mouth once daily. (Patient taking differently: Take 240 mg by mouth 2 (two) times daily. )       Physical Exam:  General appearance: alert, appears stated age and cooperative  Head: Normocephalic, without obvious abnormality, atraumatic  Eyes:  conjunctivae/corneas clear. EOM's intact.  Neck: no adenopathy, no carotid bruit, no JVD, supple, symmetrical, trachea midline and thyroid not enlarged, symmetric, no tenderness/mass/nodules  Lungs:  clear to auscultation bilaterally  Chest wall: sternotomy site healing well  Heart: regular rate and rhythm, S1, S2 normal, no murmur, click, rub or gallop  Abdomen: soft, non-tender; bowel sounds normal; no masses,  no organomegaly  Extremities: extremities normal, atraumatic, no cyanosis, mild edema B LE  Pulses: 2+ and symmetric  Skin: Skin color, texture, turgor normal. No rashes or lesions  Neurologic: Grossly normal    Laboratory:  Chemistry:   Lab Results   Component Value Date     (L) 06/03/2017    K 4.7 06/03/2017    CL 95 06/03/2017    CO2 25 06/03/2017    BUN 17 06/03/2017    CREATININE 0.7 06/03/2017    CALCIUM 8.7 06/03/2017     Cardiac Markers:   Lab Results   Component Value Date    TROPONINI 0.042 (H) 04/06/2017     Cardiac Markers (Last 3):   Lab Results   Component Value Date    TROPONINI 0.042 (H) 04/06/2017    TROPONINI 0.038 (H) 04/06/2017    TROPONINI 0.043 (H) 04/06/2017     CBC:   Lab  Results   Component Value Date    WBC 12.72 (H) 06/04/2017    HGB 7.6 (L) 06/04/2017    HCT 21.2 (L) 06/04/2017    HCT 25 (L) 05/31/2017    MCV 90 06/04/2017     06/04/2017     Lipids: No results found for: CHOL, TRIG, HDL, LDLDIRECT  Coagulation:   Lab Results   Component Value Date    INR 1.2 06/01/2017    APTT 30.6 05/31/2017       Diagnostic Results:  ECG: Reviewed  X-Ray: Reviewed  US: Reviewed  CT: Reviewed  Echo: Reviewed      ASSESSMENT/PLAN:     Patient Active Problem List   Diagnosis    Paroxysmal atrial fibrillation    Cocaine abuse    Tobacco use    Alcohol use    Aortic stenosis, severe    Essential hypertension    Aortic stenosis    Aortic valve disease    S/P AVR (aortic valve replacement)    Acute hyperglycemia    Acute blood loss anemia    Atrial fibrillation with RVR    Leukocytosis        S/P AVR FOR SEVERE AORTIC STENOSIS  POST OP A FIB RESOLVED FOR NOW.    SEVERE ANEMIA ON AM LABS TODAY.  RECOMMEND TRANSFUSING 2 U PRBC TODAY.  CONTINUE AMIODARONE DOSE  CONTINUE OTHER MEDS  HOME SOON.

## 2017-06-05 LAB
ANION GAP SERPL CALC-SCNC: 8 MMOL/L
BASOPHILS # BLD AUTO: 0.06 K/UL
BASOPHILS NFR BLD: 0.6 %
BUN SERPL-MCNC: 15 MG/DL
CALCIUM SERPL-MCNC: 8.9 MG/DL
CHLORIDE SERPL-SCNC: 92 MMOL/L
CO2 SERPL-SCNC: 32 MMOL/L
CREAT SERPL-MCNC: 0.8 MG/DL
DIFFERENTIAL METHOD: ABNORMAL
EOSINOPHIL # BLD AUTO: 0.1 K/UL
EOSINOPHIL NFR BLD: 1.3 %
ERYTHROCYTE [DISTWIDTH] IN BLOOD BY AUTOMATED COUNT: 13.9 %
EST. GFR  (AFRICAN AMERICAN): >60 ML/MIN/1.73 M^2
EST. GFR  (NON AFRICAN AMERICAN): >60 ML/MIN/1.73 M^2
GLUCOSE SERPL-MCNC: 119 MG/DL
HCT VFR BLD AUTO: 28.8 %
HGB BLD-MCNC: 9.9 G/DL
LYMPHOCYTES # BLD AUTO: 1.8 K/UL
LYMPHOCYTES NFR BLD: 18.4 %
MCH RBC QN AUTO: 30.7 PG
MCHC RBC AUTO-ENTMCNC: 34.4 %
MCV RBC AUTO: 89 FL
MONOCYTES # BLD AUTO: 1.5 K/UL
MONOCYTES NFR BLD: 15.1 %
NEUTROPHILS # BLD AUTO: 6.5 K/UL
NEUTROPHILS NFR BLD: 64.6 %
PLATELET # BLD AUTO: 177 K/UL
PMV BLD AUTO: 8.9 FL
POCT GLUCOSE: 117 MG/DL (ref 70–110)
POCT GLUCOSE: 120 MG/DL (ref 70–110)
POCT GLUCOSE: 125 MG/DL (ref 70–110)
POCT GLUCOSE: 146 MG/DL (ref 70–110)
POTASSIUM SERPL-SCNC: 4.2 MMOL/L
RBC # BLD AUTO: 3.23 M/UL
SODIUM SERPL-SCNC: 132 MMOL/L
WBC # BLD AUTO: 10 K/UL

## 2017-06-05 PROCEDURE — 63600175 PHARM REV CODE 636 W HCPCS: Performed by: SURGERY

## 2017-06-05 PROCEDURE — 80048 BASIC METABOLIC PNL TOTAL CA: CPT

## 2017-06-05 PROCEDURE — 25000003 PHARM REV CODE 250: Performed by: THORACIC SURGERY (CARDIOTHORACIC VASCULAR SURGERY)

## 2017-06-05 PROCEDURE — 25000003 PHARM REV CODE 250: Performed by: NURSE PRACTITIONER

## 2017-06-05 PROCEDURE — 97116 GAIT TRAINING THERAPY: CPT

## 2017-06-05 PROCEDURE — 97110 THERAPEUTIC EXERCISES: CPT

## 2017-06-05 PROCEDURE — 63600175 PHARM REV CODE 636 W HCPCS: Performed by: PHYSICIAN ASSISTANT

## 2017-06-05 PROCEDURE — 97530 THERAPEUTIC ACTIVITIES: CPT

## 2017-06-05 PROCEDURE — 25000003 PHARM REV CODE 250: Performed by: INTERNAL MEDICINE

## 2017-06-05 PROCEDURE — 21400001 HC TELEMETRY ROOM

## 2017-06-05 PROCEDURE — 25000003 PHARM REV CODE 250: Performed by: PHYSICIAN ASSISTANT

## 2017-06-05 PROCEDURE — 85025 COMPLETE CBC W/AUTO DIFF WBC: CPT

## 2017-06-05 PROCEDURE — 99232 SBSQ HOSP IP/OBS MODERATE 35: CPT | Mod: ,,, | Performed by: INTERNAL MEDICINE

## 2017-06-05 PROCEDURE — 36415 COLL VENOUS BLD VENIPUNCTURE: CPT

## 2017-06-05 PROCEDURE — 96372 THER/PROPH/DIAG INJ SC/IM: CPT

## 2017-06-05 RX ORDER — ATORVASTATIN CALCIUM 40 MG/1
40 TABLET, FILM COATED ORAL DAILY
Status: DISCONTINUED | OUTPATIENT
Start: 2017-06-05 | End: 2017-06-06 | Stop reason: HOSPADM

## 2017-06-05 RX ORDER — AMIODARONE HYDROCHLORIDE 200 MG/1
200 TABLET ORAL 2 TIMES DAILY
Status: DISCONTINUED | OUTPATIENT
Start: 2017-06-06 | End: 2017-06-06 | Stop reason: HOSPADM

## 2017-06-05 RX ADMIN — AMIODARONE HYDROCHLORIDE 400 MG: 200 TABLET ORAL at 08:06

## 2017-06-05 RX ADMIN — FAMOTIDINE 20 MG: 20 TABLET ORAL at 08:06

## 2017-06-05 RX ADMIN — FUROSEMIDE 40 MG: 10 INJECTION, SOLUTION INTRAMUSCULAR; INTRAVENOUS at 08:06

## 2017-06-05 RX ADMIN — THERA TABS 1 TABLET: TAB at 08:06

## 2017-06-05 RX ADMIN — DOCUSATE SODIUM 100 MG: 100 CAPSULE, LIQUID FILLED ORAL at 08:06

## 2017-06-05 RX ADMIN — ASPIRIN 81 MG: 81 TABLET, COATED ORAL at 08:06

## 2017-06-05 RX ADMIN — ENOXAPARIN SODIUM 40 MG: 100 INJECTION SUBCUTANEOUS at 04:06

## 2017-06-05 RX ADMIN — POTASSIUM CHLORIDE 20 MEQ: 1500 TABLET, EXTENDED RELEASE ORAL at 08:06

## 2017-06-05 RX ADMIN — METOPROLOL TARTRATE 25 MG: 25 TABLET ORAL at 08:06

## 2017-06-05 RX ADMIN — HYDROCODONE BITARTRATE AND ACETAMINOPHEN 1 TABLET: 10; 325 TABLET ORAL at 08:06

## 2017-06-05 RX ADMIN — HYDROCODONE BITARTRATE AND ACETAMINOPHEN 1 TABLET: 7.5; 325 TABLET ORAL at 02:06

## 2017-06-05 RX ADMIN — HYDROCODONE BITARTRATE AND ACETAMINOPHEN 1 TABLET: 10; 325 TABLET ORAL at 03:06

## 2017-06-05 RX ADMIN — Medication 100 MG: at 08:06

## 2017-06-05 RX ADMIN — FOLIC ACID 1 MG: 1 TABLET ORAL at 08:06

## 2017-06-05 RX ADMIN — CLOPIDOGREL BISULFATE 75 MG: 75 TABLET ORAL at 08:06

## 2017-06-05 RX ADMIN — NICOTINE 1 PATCH: 21 PATCH, EXTENDED RELEASE TRANSDERMAL at 08:06

## 2017-06-05 RX ADMIN — ATORVASTATIN CALCIUM 40 MG: 40 TABLET, FILM COATED ORAL at 02:06

## 2017-06-05 NOTE — PROGRESS NOTES
Patient looks good. Back in sinus currently. Received 2 prbcs. Cards to start coumadin per patient.     GEN: NAD, AAOx3  CV: Reg  ABD: soft  Incisions: CDI    Ok to DC when Cards ready. Walking in cowan.

## 2017-06-05 NOTE — PROGRESS NOTES
Cardiology Progress Note        SUBJECTIVE:     History of Present Illness:  Mr Kim is a 56 y.o. male presents with s/p AVR, tissue valve, POD # 5. He is feeling well, denies chest pain or shortness of breath.  Patient has been ambulating in [place] and using his incentive spirometer, continues to be A Fib on monitor. He has been experiencing anemia and received 2 units of PRBC on yesterday. Patient currently on ASA and Plavix, with no sign of bleeding.       OBJECTIVE:     Vital Signs (Most Recent)  Temp: 98 °F (36.7 °C) (06/05/17 1106)  Pulse: 71 (06/05/17 1106)  Resp: 18 (06/05/17 1106)  BP: 128/67 (06/05/17 1106)  SpO2: 98 % (06/05/17 1106)    Vital Signs Range (Last 24H):  Temp:  [98 °F (36.7 °C)-99 °F (37.2 °C)]   Pulse:  []   Resp:  [18]   BP: (123-158)/(64-86)   SpO2:  [95 %-99 %]     Intake/Output last 3 shifts:  I/O last 3 completed shifts:  In: 1416.3 [P.O.:960; Blood:456.3]  Out: 3635 [Urine:3635]    Intake/Output this shift:  No intake/output data recorded.    Review of patient's allergies indicates:  No Known Allergies    Current Facility-Administered Medications   Medication    0.9%  NaCl infusion (for blood administration)    amiodarone tablet 400 mg    aspirin EC tablet 81 mg    clopidogrel tablet 75 mg    dextrose 50% injection 12.5 g    dextrose 50% injection 25 g    docusate sodium capsule 100 mg    enoxaparin injection 40 mg    famotidine tablet 20 mg    folic acid tablet 1 mg    furosemide injection 40 mg    glucagon (human recombinant) injection 1 mg    glucose chewable tablet 16 g    glucose chewable tablet 24 g    hydrALAZINE injection 20 mg    hydrocodone-acetaminophen 10-325mg per tablet 1 tablet    hydrocodone-acetaminophen 5-325mg per tablet 1 tablet    hydrocodone-acetaminophen 7.5-325mg per tablet 1 tablet    insulin aspart pen 1-10 Units    lactated ringers infusion    metoprolol tartrate (LOPRESSOR) tablet 25 mg    morphine injection 2 mg    multivitamin  tablet 1 tablet    nicotine 21 mg/24 hr 1 patch    ondansetron injection 4 mg    pneumoc 13-damir conj-dip cr(PF) 0.5 mL    potassium chloride SA CR tablet 20 mEq    promethazine (PHENERGAN) 6.25 mg in dextrose 5 % 50 mL IVPB    thiamine tablet 100 mg     No current facility-administered medications on file prior to encounter.      Current Outpatient Prescriptions on File Prior to Encounter   Medication Sig    aspirin (ECOTRIN) 81 MG EC tablet Take 1 tablet (81 mg total) by mouth once daily.    diltiaZEM (CARDIZEM CD) 240 MG 24 hr capsule Take 1 capsule (240 mg total) by mouth once daily. (Patient taking differently: Take 240 mg by mouth 2 (two) times daily. )       Physical Exam:  General appearance: alert, appears stated age and cooperative  Head: Normocephalic, without obvious abnormality, atraumatic  Eyes:  conjunctivae/corneas clear. PERRL, EOM's intact. Fundi benign.  Nose: no discharge  Throat: normal findings: tongue midline and normal  Neck: no adenopathy, no carotid bruit, no JVD, supple, symmetrical, trachea midline and thyroid not enlarged, symmetric, no tenderness/mass/nodules  Back:  no skin lesions, erythema, or scars  Lungs:  clear to auscultation bilaterally  Chest wall: no tenderness  Heart: irregular rate and rhythm, S1, S2 normal, no murmur, click, rub or gallop  Abdomen: soft, non-tender; bowel sounds normal; no masses,  no organomegaly  Extremities: extremities normal, atraumatic, no cyanosis or edema  Pulses: 1+ and symmetric  Skin: Skin color, texture, turgor normal. No rashes or lesions  Neurologic: Grossly normal    Laboratory:  Chemistry:   Lab Results   Component Value Date     (L) 06/05/2017    K 4.2 06/05/2017    CL 92 (L) 06/05/2017    CO2 32 (H) 06/05/2017    BUN 15 06/05/2017    CREATININE 0.8 06/05/2017    CALCIUM 8.9 06/05/2017     Cardiac Markers:   Lab Results   Component Value Date    TROPONINI 0.042 (H) 04/06/2017     Cardiac Markers (Last 3):   Lab Results    Component Value Date    TROPONINI 0.042 (H) 04/06/2017    TROPONINI 0.038 (H) 04/06/2017    TROPONINI 0.043 (H) 04/06/2017     CBC:   Lab Results   Component Value Date    WBC 10.00 06/05/2017    HGB 9.9 (L) 06/05/2017    HCT 28.8 (L) 06/05/2017    HCT 25 (L) 05/31/2017    MCV 89 06/05/2017     06/05/2017     Lipids: No results found for: CHOL, TRIG, HDL, LDLDIRECT  Coagulation:   Lab Results   Component Value Date    INR 1.2 06/01/2017    APTT 30.6 05/31/2017       Diagnostic Results:  ECG: Reviewed  X-Ray: Reviewed  US: Reviewed  CT: Reviewed  Echo: Reviewed      ASSESSMENT/PLAN:     Patient Active Problem List   Diagnosis    Paroxysmal atrial fibrillation    Cocaine abuse    Tobacco use    Alcohol use    Aortic stenosis, severe    Essential hypertension    Aortic stenosis    Aortic valve disease    S/P AVR (aortic valve replacement)    Acute hyperglycemia    Acute blood loss anemia    Atrial fibrillation with RVR    Leukocytosis      Patient feeling well, however continues to be A Fib on monitor. He has been experiencing anemia since AVR surgery, as a result,  CVA protection for A Fib has not been started.   He will need to be started on Coumadin, as outpatient when anemia stabilize.     Plan:     Will continue current medications and treatment plan  Continue ASA, Plavix and  B Blocker,   Change Amiodarone 200 mg BID  Start atorvastatin 40 mg daily   Smoking and ETOH cessation   Follow up in Cardiology Clinic in one week after discharge.       Chart reviewed. Dr. Joyce agrees with plan as outlined above.

## 2017-06-05 NOTE — PROGRESS NOTES
Pt converted to afib on cardiac monitor. Rate ranging from . STAT EKG done to confirm Afib rhythm. Cardiology paged to notify of changes. Will carry out new orders, if any.

## 2017-06-05 NOTE — PT/OT/SLP PROGRESS
Physical Therapy  Treatment    Sukhjinder Kim   MRN: [id number]   Admitting Diagnosis: Atrial fibrillation with RVR    PT Received On: 06/05/17  PT Start Time: 1135     PT Stop Time: 1200    PT Total Time (min): 25 min       Billable Minutes:  Gait [id number] and Therapeutic Activity 10    Treatment Type: Treatment  PT/PTA: PT          General Precautions: Standard, sternal  Orthopedic Precautions: N/A   Braces: N/A    Subjective:  Communicated with NURSE GRIDER prior to session.  Pain/Comfort  Pain Rating 1: 0/10  Location 1: sternal    Objective:   Patient found with: telemetry    Functional Mobility:  Bed Mobility:   Rolling/Turning to Left: Supervision  Rolling/Turning Right: Supervision  Scooting/Bridging: Supervision  Supine to Sit: Supervision  Sit to Supine: Supervision    Transfers:  Sit <> Stand Assistance: Supervision  Sit <> Stand Assistive Device: No Assistive Device  Bed <> Chair Technique: Stand Pivot  Bed <> Chair Assistance: Supervision  Bed <> Chair Assistive Device: No Assistive Device    Gait:   Gait Distance: PT ' NO LOB OR SOB ON ROOM AIR  Assistance 1: Supervision  Gait Assistive Device: No device  Gait Pattern: swing-through gait  Gait Deviation(s): decreased kevin    Balance:   Static Sit: GOOD  Dynamic Sit: GOOD  Static Stand: GOOD  Dynamic stand:  GOOD    Therapeutic Activities and Exercises:  PT ABLE TO RECITE ALL STERNAL PRECAUTIONS, ABLE TO PERFORM SUP<>SIT TF IN BED WITHOUT ASSISTANCE    AM-PAC 6 CLICK MOBILITY  How much help from another person does this patient currently need?   1 = Unable, Total/Dependent Assistance  2 = A lot, Maximum/Moderate Assistance  3 = A little, Minimum/Contact Guard/Supervision  4 = None, Modified [place]/Independent    Turning over in bed (including adjusting bedclothes, sheets and blankets)?: 4  Sitting down on and standing up from a chair with arms (e.g., wheelchair, bedside commode, etc.): 4  Moving from lying on back to sitting on the side of  the bed?: 4  Moving to and from a bed to a chair (including a wheelchair)?: 4  Need to walk in hospital room?: 4  Climbing 3-5 steps with a railing?: 1 (NT)  Total Score: 21    AM-PAC Raw Score CMS G-Code Modifier Level of Impairment Assistance   6 % Total / Unable   7 - 9 CM 80 - 100% Maximal Assist   10 - 14 CL 60 - 80% Moderate Assist   15 - 19 CK 40 - 60% Moderate Assist   20 - 22 CJ 20 - 40% Minimal Assist   23 CI 1-20% SBA / CGA   24 CH 0% Independent/ Mod I     Patient left up in chair with all lines intact, call button in reach and NURSE notified.    Assessment:  Sukhjinder Kim is a 56 y.o. male with a medical diagnosis of Atrial fibrillation with RVR   PT IS NO LONGER A CANDIDATE FOR INPATIENT SKILLED P.T. DUE TO HIGH LEVEL OF FUNCTION, PT WILL BENEFIT FROM [facility] FOR CONT GAIT/TE    Rehab identified problem list/impairments:     Rehab potential is     Activity tolerance:     Discharge recommendations: Discharge Facility/Level Of Care Needs: home health PT     Barriers to discharge: Barriers to Discharge: None ([facility])    Equipment recommendations: Equipment Needed After Discharge: bath bench     GOALS:    Physical Therapy Goals        Problem: Physical Therapy Goal    Goal Priority Disciplines Outcome Goal Variances Interventions   Physical Therapy Goal     PT/OT, PT Ongoing (interventions implemented as appropriate)     Description:  LTG'S TO BE MET IN 7 DAYS (6-8-17)  1. PT WILL REQUIRE SBA FOR BED MOBILITY  2. PT WILL REQUIRE SPV FOR TF'S  3. PT WILL ' WITH SPV  4. PT WILL DEMO G DYNAMIC BALANCE DURING GAIT  5. PT WILL TOLERATE BLE THEREX X 20 REPS AROM                   PLAN:    Patient to be seen 5 x/week  to address the above listed problems via  (D/C PT FROM P.T. SERVICES TO [facility])  Plan of Care expires: 06/08/17  Plan of Care reviewed with: patient    PT ENCOURAGED TO CALL FOR ASSISTANCE WITH ALL NEEDS, PT AGREEABLE.  PT ENCOURAGED TO  INCREASE TIME OOB IN CHAIR, ALL MEALS IN CHAIR OOB, PT AGREEABLE    Tori Zavaleta, PT  06/05/2017

## 2017-06-05 NOTE — PT/OT/SLP PROGRESS
Physical Therapy  Treatment    Sukhjinder Kim   MRN: [id number]   Admitting Diagnosis: Atrial fibrillation with RVR    PT Received On: 06/05/17  PT Start Time: 0840     PT Stop Time: 0905    PT Total Time (min): 25 min       Billable Minutes:  Gait [id number] and Therapeutic Exercise 10    Treatment Type: Treatment  PT/PTA: PT          General Precautions: Standard, sternal  Orthopedic Precautions: N/A   Braces: N/A    Subjective:  Communicated with NURSE GRIDER prior to session.  Pain/Comfort  Pain Rating 1: 3/10  Location 1: sternal    Objective:   Patient found with: telemetry    Functional Mobility:  Bed Mobility:   Scooting/Bridging: Stand by Assistance    Transfers:  Sit <> Stand Assistance: Stand By Assistance  Sit <> Stand Assistive Device: No Assistive Device  Bed <> Chair Technique: Stand Pivot  Bed <> Chair Assistance: Stand By Assistance  Bed <> Chair Assistive Device: No Assistive Device    Gait:   Gait Distance: PT ' NO LOB OR SOB ON ROOM AIR  Assistance 1: Stand by Assistance  Gait Assistive Device: No device  Gait Pattern: swing-through gait  Gait Deviation(s): decreased kevin    Balance:   Static Sit: GOOD  Dynamic Sit: GOOD  Static Stand: GOOD  Dynamic stand:  GOOD-    Therapeutic Activities and Exercises:  PT ABLE TO RECITE 3/5 STERNAL PRECAUTIONS, PT EDUCATED IN AND PERFORMED BLE THEREX X 20 REPS AROM    AM-PAC 6 CLICK MOBILITY  How much help from another person does this patient currently need?   1 = Unable, Total/Dependent Assistance  2 = A lot, Maximum/Moderate Assistance  3 = A little, Minimum/Contact Guard/Supervision  4 = None, Modified [place]/Independent    Turning over in bed (including adjusting bedclothes, sheets and blankets)?: 4  Sitting down on and standing up from a chair with arms (e.g., wheelchair, bedside commode, etc.): 4  Moving from lying on back to sitting on the side of the bed?: 4  Moving to and from a bed to a chair (including a wheelchair)?: 4  Need to walk in  hospital room?: 4  Climbing 3-5 steps with a railing?: 1  Total Score: 21    AM-PAC Raw Score CMS G-Code Modifier Level of Impairment Assistance   6 % Total / Unable   7 - 9 CM 80 - 100% Maximal Assist   10 - 14 CL 60 - 80% Moderate Assist   15 - 19 CK 40 - 60% Moderate Assist   20 - 22 CJ 20 - 40% Minimal Assist   23 CI 1-20% SBA / CGA   24 CH 0% Independent/ Mod I     Patient left up in chair with all lines intact, call button in reach and NURSE notified.    Assessment:  Sukhjinder Kim is a 56 y.o. male with a medical diagnosis of Atrial fibrillation with RVR   PT WILL BENEFIT FROM CONT. SKILLED P.T. TO ADDRESS IMPAIRMENTS    Rehab identified problem list/impairments: Rehab identified problem list/impairments: impaired endurance, decreased safety awareness    Rehab potential is good.    Activity tolerance: Good    Discharge recommendations: Discharge Facility/Level Of Care Needs: home health PT     Barriers to discharge: Barriers to Discharge: None    Equipment recommendations: Equipment Needed After Discharge: bath bench     GOALS:    Physical Therapy Goals        Problem: Physical Therapy Goal    Goal Priority Disciplines Outcome Goal Variances Interventions   Physical Therapy Goal     PT/OT, PT Ongoing (interventions implemented as appropriate)     Description:  LTG'S TO BE MET IN 7 DAYS (6-8-17)  1. PT WILL REQUIRE SBA FOR BED MOBILITY  2. PT WILL REQUIRE SPV FOR TF'S  3. PT WILL ' WITH SPV  4. PT WILL DEMO G DYNAMIC BALANCE DURING GAIT  5. PT WILL TOLERATE BLE THEREX X 20 REPS AROM                   PLAN:    Patient to be seen 5 x/week  to address the above listed problems via gait training, therapeutic activities, therapeutic exercises  Plan of Care expires: 06/08/17  Plan of Care reviewed with: patient    PT ENCOURAGED TO CALL FOR ASSISTANCE WITH ALL NEEDS DUE TO FALL RISK STATUS, PT AGREEABLE.  PT ENCOURAGED TO INCREASE TIME OOB IN CHAIR, ALL MEALS IN CHAIR OOB, PT AGREEABLE    Tori  Waqar, PT  06/05/2017

## 2017-06-05 NOTE — PHYSICIAN QUERY
PT Name: Sukhjinder Kim  MR #: [id number]    Physician Query Form - Relationship to Procedure Clarification     CDS/: Natacha Valdovinos  RN CCDS         Contact information: [phone]  This form is a permanent document in the medical record.     Query Date: June 5, 2017      By submitting this query, we are merely seeking further clarification of documentation. Please utilize your independent clinical judgment when addressing the question(s) below.    The Medical record contains the following:  Supporting Clinical Findings Location in Medical Record   Atrial fibrillation with RVR     Developed RVR 6/3/7 now has amiodarone started and being managed per cardiology.     6/4/17: HR controlled with Amiodarone and Metoprolol   POST OP A FIB RESOLVED FOR NOW   PN hospitalist 6/4            PN Cardiology 6/4        Please clarify if ___Post op a-fib_____ is    [x  ] Inherent/Integral to procedure  [  ] Routine outcome  [  ] Incidental finding  [  ] Complication of procedure  [  ] Clinically insignificant  [  ] Clinically undetermined

## 2017-06-06 VITALS
DIASTOLIC BLOOD PRESSURE: 78 MMHG | OXYGEN SATURATION: 95 % | HEIGHT: 69 IN | TEMPERATURE: 99 F | RESPIRATION RATE: 18 BRPM | HEART RATE: 67 BPM | BODY MASS INDEX: 38.86 KG/M2 | WEIGHT: 262.38 LBS | SYSTOLIC BLOOD PRESSURE: 156 MMHG

## 2017-06-06 LAB
POCT GLUCOSE: 106 MG/DL (ref 70–110)
POCT GLUCOSE: 123 MG/DL (ref 70–110)
POCT GLUCOSE: 127 MG/DL (ref 70–110)

## 2017-06-06 PROCEDURE — 99232 SBSQ HOSP IP/OBS MODERATE 35: CPT | Mod: ,,, | Performed by: INTERNAL MEDICINE

## 2017-06-06 PROCEDURE — 63600175 PHARM REV CODE 636 W HCPCS: Performed by: PHYSICIAN ASSISTANT

## 2017-06-06 PROCEDURE — 25000003 PHARM REV CODE 250: Performed by: PHYSICIAN ASSISTANT

## 2017-06-06 PROCEDURE — 63600175 PHARM REV CODE 636 W HCPCS: Performed by: SURGERY

## 2017-06-06 PROCEDURE — 25000003 PHARM REV CODE 250: Performed by: THORACIC SURGERY (CARDIOTHORACIC VASCULAR SURGERY)

## 2017-06-06 PROCEDURE — 25000003 PHARM REV CODE 250: Performed by: INTERNAL MEDICINE

## 2017-06-06 PROCEDURE — 25000003 PHARM REV CODE 250: Performed by: NURSE PRACTITIONER

## 2017-06-06 PROCEDURE — 99231 SBSQ HOSP IP/OBS SF/LOW 25: CPT | Mod: ,,, | Performed by: INTERNAL MEDICINE

## 2017-06-06 RX ORDER — METOPROLOL TARTRATE 25 MG/1
25 TABLET, FILM COATED ORAL 2 TIMES DAILY
Qty: 60 TABLET | Refills: 1 | Status: SHIPPED | OUTPATIENT
Start: 2017-06-06 | End: 2020-03-16 | Stop reason: SDUPTHER

## 2017-06-06 RX ORDER — CLOPIDOGREL BISULFATE 75 MG/1
75 TABLET ORAL DAILY
Qty: 30 TABLET | Refills: 1 | Status: SHIPPED | OUTPATIENT
Start: 2017-06-06 | End: 2020-03-16 | Stop reason: SDUPTHER

## 2017-06-06 RX ORDER — AMIODARONE HYDROCHLORIDE 200 MG/1
200 TABLET ORAL 2 TIMES DAILY
Qty: 60 TABLET | Refills: 1 | Status: ON HOLD | OUTPATIENT
Start: 2017-06-06 | End: 2021-03-25 | Stop reason: HOSPADM

## 2017-06-06 RX ORDER — HYDROCODONE BITARTRATE AND ACETAMINOPHEN 10; 325 MG/1; MG/1
1 TABLET ORAL EVERY 6 HOURS PRN
Qty: 12 TABLET | Refills: 0 | Status: SHIPPED | OUTPATIENT
Start: 2017-06-06 | End: 2020-03-16

## 2017-06-06 RX ORDER — ATORVASTATIN CALCIUM 40 MG/1
40 TABLET, FILM COATED ORAL DAILY
Qty: 30 TABLET | Refills: 1 | Status: SHIPPED | OUTPATIENT
Start: 2017-06-06 | End: 2020-03-16 | Stop reason: SDUPTHER

## 2017-06-06 RX ADMIN — METOPROLOL TARTRATE 25 MG: 25 TABLET ORAL at 08:06

## 2017-06-06 RX ADMIN — AMIODARONE HYDROCHLORIDE 200 MG: 200 TABLET ORAL at 08:06

## 2017-06-06 RX ADMIN — THERA TABS 1 TABLET: TAB at 09:06

## 2017-06-06 RX ADMIN — Medication 100 MG: at 09:06

## 2017-06-06 RX ADMIN — DOCUSATE SODIUM 100 MG: 100 CAPSULE, LIQUID FILLED ORAL at 09:06

## 2017-06-06 RX ADMIN — FAMOTIDINE 20 MG: 20 TABLET ORAL at 09:06

## 2017-06-06 RX ADMIN — HYDROCODONE BITARTRATE AND ACETAMINOPHEN 1 TABLET: 5; 325 TABLET ORAL at 09:06

## 2017-06-06 RX ADMIN — DOCUSATE SODIUM 100 MG: 100 CAPSULE, LIQUID FILLED ORAL at 08:06

## 2017-06-06 RX ADMIN — POTASSIUM CHLORIDE 20 MEQ: 1500 TABLET, EXTENDED RELEASE ORAL at 09:06

## 2017-06-06 RX ADMIN — FAMOTIDINE 20 MG: 20 TABLET ORAL at 08:06

## 2017-06-06 RX ADMIN — HYDROCODONE BITARTRATE AND ACETAMINOPHEN 1 TABLET: 7.5; 325 TABLET ORAL at 04:06

## 2017-06-06 RX ADMIN — FOLIC ACID 1 MG: 1 TABLET ORAL at 09:06

## 2017-06-06 RX ADMIN — METOPROLOL TARTRATE 25 MG: 25 TABLET ORAL at 09:06

## 2017-06-06 RX ADMIN — ENOXAPARIN SODIUM 40 MG: 100 INJECTION SUBCUTANEOUS at 05:06

## 2017-06-06 RX ADMIN — ASPIRIN 81 MG: 81 TABLET, COATED ORAL at 09:06

## 2017-06-06 RX ADMIN — AMIODARONE HYDROCHLORIDE 200 MG: 200 TABLET ORAL at 09:06

## 2017-06-06 RX ADMIN — FUROSEMIDE 40 MG: 10 INJECTION, SOLUTION INTRAMUSCULAR; INTRAVENOUS at 09:06

## 2017-06-06 RX ADMIN — ATORVASTATIN CALCIUM 40 MG: 40 TABLET, FILM COATED ORAL at 09:06

## 2017-06-06 RX ADMIN — HYDROCODONE BITARTRATE AND ACETAMINOPHEN 1 TABLET: 7.5; 325 TABLET ORAL at 03:06

## 2017-06-06 RX ADMIN — CLOPIDOGREL BISULFATE 75 MG: 75 TABLET ORAL at 09:06

## 2017-06-06 NOTE — PROGRESS NOTES
Progress Note  Pulmonology    Admit Date: 5/31/2017   LOS: 6 days     SUBJECTIVE: feels better     HPI:  56 y.o. male with a PMH of HTN, paroxysmal atrial fib, severe aortic valve stenosis, obesity, and tobacco, alcohol, marijuana, and cocaine abuse who presented for [facility] today for planned aortic valve replacement.    Follow-up For:  Post op atelectesis - doing well    Continuous Infusions:   Scheduled Meds:   amiodarone  200 mg Oral BID    aspirin  81 mg Oral Daily    atorvastatin  40 mg Oral Daily    clopidogrel  75 mg Oral Daily    docusate sodium  100 mg Oral BID    enoxaparin  40 mg Subcutaneous Q24H    famotidine  20 mg Oral BID    folic acid  1 mg Oral Daily    furosemide  40 mg Intravenous Daily    metoprolol tartrate  25 mg Oral BID    multivitamin  1 tablet Oral Daily    nicotine  1 patch Transdermal Daily    potassium chloride  20 mEq Oral Daily    thiamine  100 mg Oral Daily       Review of Systems:  Constitutional: no fever or chills  Respiratory: no cough or shortness of breath  Cardiovascular: no chest pain or palpitations    OBJECTIVE:     Vital Signs Range (Last 24H):  Temp:  [97.8 °F (36.6 °C)-98.7 °F (37.1 °C)]   Pulse:  [62-73]   Resp:  [16-18]   BP: (113-169)/(54-88)   SpO2:  [93 %-99 %]     I & O (Last 24H):  Intake/Output Summary (Last 24 hours) at 06/06/17 1342  Last data filed at 06/06/17 0447   Gross per 24 hour   Intake                0 ml   Output             1550 ml   Net            -1550 ml     Physical Exam:  General: no distress  Neck: no jugular venous distention  Lungs:  diminished breath sounds bibasilar and bilaterally  Chest Wall: no tenderness  Heart: regular rate and rhythm and no murmur  Abdomen: soft, non-tender non-distended; bowel sounds normal  Extremities: no cyanosis or edema, or clubbing  Neurologic: alert, oriented, thought content appropriate    Laboratory:  CBC:   Recent Labs  Lab 06/05/17  0545   WBC 10.00   RBC 3.23*   HGB 9.9*   HCT 28.8*   PLT  177   MCV 89   MCH 30.7   MCHC 34.4     BMP:   Recent Labs  Lab 06/03/17  0436 06/05/17  0545   * 119*   * 132*   K 4.7 4.2   CL 95 92*   CO2 25 32*   BUN 17 15   CREATININE 0.7 0.8   CALCIUM 8.7 8.9   MG 1.9  --      CMP:   Recent Labs  Lab 06/05/17  0545   *   CALCIUM 8.9   *   K 4.2   CO2 32*   CL 92*   BUN 15   CREATININE 0.8     LFTs: No results for input(s): ALT, AST, ALKPHOS, BILITOT, PROT, ALBUMIN in the last 168 hours.    Chest X-Ray: I personally reviewed the films and findings are:, post op changes    Diagnostic Results:  na    ASSESSMENT/PLAN:     Patient Active Problem List   Diagnosis    Paroxysmal atrial fibrillation    Cocaine abuse    Tobacco use    Alcohol use    Aortic stenosis, severe    Essential hypertension    Aortic stenosis    Aortic valve disease    S/P AVR (aortic valve replacement)    Acute hyperglycemia    Acute blood loss anemia    Atrial fibrillation with RVR    Leukocytosis         Plan: Continue Current, incentive spirometry, Jet nebs.   Will sign off       Nik Martinez MD  Pulmonary / Critical Care Medicine  .

## 2017-06-06 NOTE — PROGRESS NOTES
Appears to be SR this afternoon    Ambulating without issue    A/P  Cont amio for a.fib  Cards plans to start coumadin as outpatient  ASA/b-blocker  Discharge home when cards ok with rhythm control

## 2017-06-06 NOTE — PROGRESS NOTES
Cardiology Progress Note        SUBJECTIVE:     History of Present Illness:  Patient is a 56 y.o. male presents with s/p AVR, tissue valve, POD # 6. He is feeling well, denies chest pain or shortness. Patient has now converted back to sinus rhythm. He had been experiencing anemia, however blood counts now stable.       OBJECTIVE:     Vital Signs (Most Recent)  Temp: 99 °F (37.2 °C) (06/06/17 1600)  Pulse: 63 (06/06/17 1600)  Resp: 18 (06/06/17 1600)  BP: 133/72 (06/06/17 1600)  SpO2: 98 % (06/06/17 1600)    Vital Signs Range (Last 24H):  Temp:  [97.8 °F (36.6 °C)-99 °F (37.2 °C)]   Pulse:  [62-72]   Resp:  [16-18]   BP: (113-169)/(54-88)   SpO2:  [93 %-99 %]     Intake/Output last 3 shifts:  I/O last 3 completed shifts:  In: 480 [P.O.:480]  Out: 3950 [Urine:3950]    Intake/Output this shift:  I/O this shift:  In: 600 [P.O.:600]  Out: -     Review of patient's allergies indicates:  No Known Allergies    Current Facility-Administered Medications   Medication    0.9%  NaCl infusion (for blood administration)    amiodarone tablet 200 mg    aspirin EC tablet 81 mg    atorvastatin tablet 40 mg    clopidogrel tablet 75 mg    dextrose 50% injection 12.5 g    dextrose 50% injection 25 g    docusate sodium capsule 100 mg    enoxaparin injection 40 mg    famotidine tablet 20 mg    folic acid tablet 1 mg    furosemide injection 40 mg    glucagon (human recombinant) injection 1 mg    glucose chewable tablet 16 g    glucose chewable tablet 24 g    hydrALAZINE injection 20 mg    hydrocodone-acetaminophen 10-325mg per tablet 1 tablet    hydrocodone-acetaminophen 5-325mg per tablet 1 tablet    hydrocodone-acetaminophen 7.5-325mg per tablet 1 tablet    insulin aspart pen 1-10 Units    lactated ringers infusion    metoprolol tartrate (LOPRESSOR) tablet 25 mg    morphine injection 2 mg    multivitamin tablet 1 tablet    nicotine 21 mg/24 hr 1 patch    ondansetron injection 4 mg    pneumoc 13-damir conj-dip  cr(PF) 0.5 mL    potassium chloride SA CR tablet 20 mEq    promethazine (PHENERGAN) 6.25 mg in dextrose 5 % 50 mL IVPB    thiamine tablet 100 mg     No current facility-administered medications on file prior to encounter.      Current Outpatient Prescriptions on File Prior to Encounter   Medication Sig    aspirin (ECOTRIN) 81 MG EC tablet Take 1 tablet (81 mg total) by mouth once daily.    diltiaZEM (CARDIZEM CD) 240 MG 24 hr capsule Take 1 capsule (240 mg total) by mouth once daily. (Patient taking differently: Take 240 mg by mouth 2 (two) times daily. )       Physical Exam:  General appearance: alert, appears stated age and cooperative  Head: Normocephalic, without obvious abnormality, atraumatic  Eyes:  conjunctivae/corneas clear. PERRL, EOM's intact. Fundi benign.  Nose: no discharge  Throat: normal findings: tongue midline and normal  Neck: no adenopathy, no carotid bruit, no JVD, supple, symmetrical, trachea midline and thyroid not enlarged, symmetric, no tenderness/mass/nodules  Back:  no skin lesions, erythema, or scars  Lungs:  clear to auscultation bilaterally  Chest wall: no tenderness, mid sternal incision intact.   Heart: regular rate and rhythm, S1, S2 normal, no murmur, click, rub or gallop  Abdomen: soft, non-tender; bowel sounds normal; no masses,  no organomegaly  Extremities: extremities normal, atraumatic, no cyanosis or edema  Pulses: 2+ and symmetric  Skin: Skin color, texture, turgor normal. No rashes or lesions  Neurologic: Grossly normal    Laboratory:  Chemistry:   Lab Results   Component Value Date     (L) 06/05/2017    K 4.2 06/05/2017    CL 92 (L) 06/05/2017    CO2 32 (H) 06/05/2017    BUN 15 06/05/2017    CREATININE 0.8 06/05/2017    CALCIUM 8.9 06/05/2017     Cardiac Markers:   Lab Results   Component Value Date    TROPONINI 0.042 (H) 04/06/2017     Cardiac Markers (Last 3):   Lab Results   Component Value Date    TROPONINI 0.042 (H) 04/06/2017    TROPONINI 0.038 (H)  04/06/2017    TROPONINI 0.043 (H) 04/06/2017     CBC:   Lab Results   Component Value Date    WBC 10.00 06/05/2017    HGB 9.9 (L) 06/05/2017    HCT 28.8 (L) 06/05/2017    HCT 25 (L) 05/31/2017    MCV 89 06/05/2017     06/05/2017     Lipids: No results found for: CHOL, TRIG, HDL, LDLDIRECT  Coagulation:   Lab Results   Component Value Date    INR 1.2 06/01/2017    APTT 30.6 05/31/2017       Diagnostic Results:  ECG: Reviewed  X-Ray: Reviewed  US: Reviewed  CT: Reviewed  Echo: Reviewed      ASSESSMENT/PLAN:     Patient Active Problem List   Diagnosis    Paroxysmal atrial fibrillation    Cocaine abuse    Tobacco use    Alcohol use    Aortic stenosis, severe    Essential hypertension    Aortic stenosis    Aortic valve disease    S/P AVR (aortic valve replacement)    Acute hyperglycemia    Acute blood loss anemia    Atrial fibrillation with RVR    Leukocytosis        Plan:     Will continue current medications and treatment plan  Continue ASA, Plavix, amiodarone, atorvastatin and B blocker  No anticoagulation for CVA protection from PAF due to recent anemia.  Ok to discharge for Cardiology Standpoint   Follow up in Cardiology Clinic in 1-2 weeks     Chart reviewed. Dr. Joyce agrees with plan as outlined above.

## 2017-06-06 NOTE — PROGRESS NOTES
Pt stated ride for home will not be here until 7:30-8 pm.  Pt to receive amiodarone and metoprolol dose before discharge.

## 2017-06-07 NOTE — PROGRESS NOTES
Discharge orders received, orders reviewed with patient and family, pt instructed when to take each medicine next.  Pt instructed on where to get Rx, night meds given before discharge. Pt informed of follow up appointments and importance of following up with apts.  PIV out, tele off, pt dressed self in own clothing. Pt given extensive educational material for following CABG surgery.  Sternal precautions reviewed with pt.  Pt verbalized understanding and does not have any questions at this time.  Pt escorted to lobby via wheelchair via staff.  Pt personal belongings with pt and family.

## 2017-06-07 NOTE — PROGRESS NOTES
D/C orders received and reviewed with patient/family. PIV removed. Telemetry monitor removed. Patient instructed when to take each medications next dose. RX given. Patient dressed self and was escorted to lobby via wheelchair.

## 2017-06-13 NOTE — DISCHARGE SUMMARY
Date of Admit: 5/31/17  Date of Discharge: 6/6/17    Attending MD: Gerardo Hernandez MD  Cardiology: Dr. Scott Joyce MD    Principle Dx:   1. Severe Aortic Stenosis  2. Hypertension  3. Obesity  4. Tobacco Abuse    PROCEDURES:  1.  A 25 mm tissue aortic valve replacement.  2.  Transesophageal echo.    History and Indication for surgery    Mr. Kim is a 56-year-old gentleman who presented   after an episode of shortness of breath and heart failure.  Cardiac workup   revealed severe aortic stenosis and he was referred for aortic valve   replacement.  We discussed the type of valve and he elected to proceed with a   tissue valve.  We also discussed the risks, benefits and alternatives in detail. The patient tolerated the procedure   well and was taken to ICU in stable condition.     Post Op Course:   On post op day one , the patient was extubated and OOB to chair per post op heart protocol, cardene infusion for BP control and cardiology managing bp meds. He was transfused with 2 units of PRBC for H/H of 7 & 19. Other pertinent labs include creatinine of 0.8 and BUN of 13, Na 137 and K 4.1. He developed Afib and was initiated on Amiodarone ggt per cardiology. He was later transferred to telemetry floor where he underwent cardiac rehabilitation by ambulating halls with assistance of staff. He also continued to pulmonary toilet with use of I/S and was diuresed with IV diuretics. He also received another transfusion of PRBC on 6/4/17 for H/H of 7.6 & 21.2. Otherwise he continued to progress routinely throughout hospital stay. It was felt he had reached his maximal hospital benefit stay on 6/6/17 at which point he was discharged home with a full set of home instructions as to his continued home behavior and sternal precautions.     At time of d/c, his VSS and he was NSR on antiarrythmic meds per cardiology as well as ASA/ Plavix    At time of d/c pertinent labs include  CBC:   Lab Results   Component Value Date     WBC  10.00 06/05/2017     HGB 9.9 (L) 06/05/2017     HCT 28.8 (L) 06/05/2017     HCT 25 (L) 05/31/2017     MCV 89 06/05/2017      06/05/2017     Discharge meds:  Amiodarone 200 mg BID  Atorvastatin 40 mg  Clopidogrel 75 mg  Norco 10-325mg  Metoprolol 25mg   ASA 81mg

## 2020-03-16 ENCOUNTER — HOSPITAL ENCOUNTER (OUTPATIENT)
Dept: CARDIOLOGY | Facility: HOSPITAL | Age: 60
Discharge: HOME | End: 2020-03-16
Payer: MEDICAID

## 2020-03-16 ENCOUNTER — HOSPITAL ENCOUNTER (OUTPATIENT)
Dept: RADIOLOGY | Facility: HOSPITAL | Age: 60
Discharge: HOME | End: 2020-03-16
Attending: NURSE PRACTITIONER
Payer: MEDICAID

## 2020-03-16 ENCOUNTER — OFFICE VISIT (OUTPATIENT)
Dept: INTERNAL MEDICINE | Facility: CLINIC | Age: 60
End: 2020-03-16
Payer: MEDICAID

## 2020-03-16 VITALS
OXYGEN SATURATION: 95 % | HEIGHT: 69 IN | DIASTOLIC BLOOD PRESSURE: 81 MMHG | WEIGHT: 255.94 LBS | BODY MASS INDEX: 37.91 KG/M2 | SYSTOLIC BLOOD PRESSURE: 177 MMHG | HEART RATE: 75 BPM

## 2020-03-16 DIAGNOSIS — R10.30 LOWER ABDOMINAL PAIN: ICD-10-CM

## 2020-03-16 DIAGNOSIS — Z95.2 S/P AVR (AORTIC VALVE REPLACEMENT): ICD-10-CM

## 2020-03-16 DIAGNOSIS — I48.0 PAROXYSMAL ATRIAL FIBRILLATION: ICD-10-CM

## 2020-03-16 DIAGNOSIS — F14.10 COCAINE ABUSE: ICD-10-CM

## 2020-03-16 DIAGNOSIS — I35.0 AORTIC VALVE STENOSIS, ETIOLOGY OF CARDIAC VALVE DISEASE UNSPECIFIED: ICD-10-CM

## 2020-03-16 DIAGNOSIS — I10 ESSENTIAL HYPERTENSION: ICD-10-CM

## 2020-03-16 DIAGNOSIS — R10.30 LOWER ABDOMINAL PAIN: Primary | ICD-10-CM

## 2020-03-16 DIAGNOSIS — Z72.0 TOBACCO USE: ICD-10-CM

## 2020-03-16 PROCEDURE — 99204 OFFICE O/P NEW MOD 45 MIN: CPT | Mod: S$PBB,,, | Performed by: NURSE PRACTITIONER

## 2020-03-16 PROCEDURE — 99999 PR PBB SHADOW E&M-EST. PATIENT-LVL V: CPT | Mod: PBBFAC,,, | Performed by: NURSE PRACTITIONER

## 2020-03-16 PROCEDURE — 74019 RADEX ABDOMEN 2 VIEWS: CPT | Mod: TC,PO

## 2020-03-16 PROCEDURE — 93010 ELECTROCARDIOGRAM REPORT: CPT | Mod: ,,, | Performed by: INTERNAL MEDICINE

## 2020-03-16 PROCEDURE — 99215 OFFICE O/P EST HI 40 MIN: CPT | Mod: PBBFAC,PO,25 | Performed by: NURSE PRACTITIONER

## 2020-03-16 PROCEDURE — 74019 XR ABDOMEN FLAT AND ERECT: ICD-10-PCS | Mod: 26,,, | Performed by: RADIOLOGY

## 2020-03-16 PROCEDURE — 74019 RADEX ABDOMEN 2 VIEWS: CPT | Mod: 26,,, | Performed by: RADIOLOGY

## 2020-03-16 PROCEDURE — 99204 PR OFFICE/OUTPT VISIT, NEW, LEVL IV, 45-59 MIN: ICD-10-PCS | Mod: S$PBB,,, | Performed by: NURSE PRACTITIONER

## 2020-03-16 PROCEDURE — 99999 PR PBB SHADOW E&M-EST. PATIENT-LVL V: ICD-10-PCS | Mod: PBBFAC,,, | Performed by: NURSE PRACTITIONER

## 2020-03-16 PROCEDURE — 93010 EKG 12-LEAD: ICD-10-PCS | Mod: ,,, | Performed by: INTERNAL MEDICINE

## 2020-03-16 PROCEDURE — 93005 ELECTROCARDIOGRAM TRACING: CPT | Mod: PO

## 2020-03-16 RX ORDER — CLOPIDOGREL BISULFATE 75 MG/1
75 TABLET ORAL DAILY
Qty: 90 TABLET | Refills: 0 | Status: ON HOLD | OUTPATIENT
Start: 2020-03-16 | End: 2021-03-25 | Stop reason: HOSPADM

## 2020-03-16 RX ORDER — ATORVASTATIN CALCIUM 40 MG/1
40 TABLET, FILM COATED ORAL DAILY
Qty: 90 TABLET | Refills: 0 | Status: ON HOLD | OUTPATIENT
Start: 2020-03-16 | End: 2021-03-25 | Stop reason: SDUPTHER

## 2020-03-16 RX ORDER — METOPROLOL TARTRATE 25 MG/1
25 TABLET, FILM COATED ORAL 2 TIMES DAILY
Qty: 60 TABLET | Refills: 1 | Status: ON HOLD | OUTPATIENT
Start: 2020-03-16 | End: 2021-03-25 | Stop reason: HOSPADM

## 2020-03-16 NOTE — PROGRESS NOTES
Subjective:       Patient ID: Sukhjinder Kim is a 59 y.o. male.    Chief Complaint: Abdominal Pain (Since 2 weeks ago )    Mr. Kim is here today c/o mid-lower abdominal pain for approx 2-3 hours. He reports that he usually has BM every morning, but has not had one today. Otherwise BM have been normal. Denies N/V/D. Pain is intermittent and varying in severity.     Abdominal Pain   This is a new problem. The current episode started today (approx 2 hours ago). The onset quality is sudden. The problem occurs intermittently. The problem has been rapidly worsening. The pain is located in the LLQ and RLQ. The pain is at a severity of 8/10. The quality of the pain is aching. The abdominal pain radiates to the periumbilical region. Associated symptoms include nausea (x2 weeks, intermittent). Pertinent negatives include no arthralgias, belching, constipation, diarrhea, dysuria, fever, flatus, frequency, headaches, hematochezia, hematuria, myalgias or vomiting. Nothing aggravates the pain. The pain is relieved by nothing. He has tried nothing for the symptoms. There is no history of abdominal surgery, gallstones, GERD, irritable bowel syndrome or pancreatitis. Patient's medical history does not include kidney stones and UTI.       Patient Active Problem List   Diagnosis    Paroxysmal atrial fibrillation    Cocaine abuse    Tobacco use    Alcohol use    Aortic stenosis, severe    Essential hypertension    Aortic stenosis    Aortic valve disease    S/P AVR (aortic valve replacement)    Acute hyperglycemia    Acute blood loss anemia    Atrial fibrillation with RVR    Leukocytosis    Lower abdominal pain       Family History   Problem Relation Age of Onset    Valvular heart disease Father         64 yo    Heart attacks under age 50 Father 42    Valvular heart disease Brother 27        valve Replacement surgery    Valvular heart disease Brother 62        valvue Replacement    Diabetes Mother     Stroke Mother   "    Past Surgical History:   Procedure Laterality Date    APPENDECTOMY           Current Outpatient Medications:     aspirin (ECOTRIN) 81 MG EC tablet, Take 1 tablet (81 mg total) by mouth once daily., Disp: , Rfl: 0    amiodarone (PACERONE) 200 MG Tab, Take 1 tablet (200 mg total) by mouth 2 (two) times daily., Disp: 60 tablet, Rfl: 1    atorvastatin (LIPITOR) 40 MG tablet, Take 1 tablet (40 mg total) by mouth once daily., Disp: 90 tablet, Rfl: 0    clopidogreL (PLAVIX) 75 mg tablet, Take 1 tablet (75 mg total) by mouth once daily., Disp: 90 tablet, Rfl: 0    metoprolol tartrate (LOPRESSOR) 25 MG tablet, Take 1 tablet (25 mg total) by mouth 2 (two) times daily., Disp: 60 tablet, Rfl: 1    Review of Systems   Constitutional: Negative for chills, fatigue and fever.   Respiratory: Negative for cough, chest tightness, shortness of breath and wheezing.    Cardiovascular: Negative for chest pain, palpitations and leg swelling.   Gastrointestinal: Positive for abdominal pain and nausea (x2 weeks, intermittent). Negative for blood in stool, constipation, diarrhea, flatus, hematochezia, rectal pain and vomiting.   Genitourinary: Negative for dysuria, flank pain, frequency, hematuria, scrotal swelling, testicular pain and urgency.   Musculoskeletal: Negative for arthralgias and myalgias.   Neurological: Negative for dizziness, syncope, light-headedness, numbness and headaches.       Objective:   BP (!) 177/81 (BP Location: Right arm, Patient Position: Sitting, BP Method: X-Large (Automatic))   Pulse 75   Ht 5' 9" (1.753 m)   Wt 116.1 kg (255 lb 15.3 oz)   SpO2 95%   BMI 37.80 kg/m²      Physical Exam   Constitutional: He is oriented to person, place, and time. He appears well-developed and well-nourished. He is cooperative. He does not appear ill. No distress.   Appears intermittently uncomforable with abdominal pain. Most comfortable with lying down   Cardiovascular: Normal rate, regular rhythm and intact distal " pulses.   Murmur heard.  Pulmonary/Chest: Effort normal. No respiratory distress. He has wheezes.   Abdominal: Soft. Bowel sounds are normal. There is tenderness in the periumbilical area. A hernia (periumbilical area with mild TTP) is present.   Musculoskeletal: Normal range of motion. He exhibits edema (+1 trace edema noted to bilateral feet and ankles). He exhibits no tenderness.   Neurological: He is alert and oriented to person, place, and time. No cranial nerve deficit.   Skin: Skin is warm and dry. He is not diaphoretic.       Assessment & Plan     Problem List Items Addressed This Visit        Psychiatric    Cocaine abuse    Current Assessment & Plan     Denies recent use. Reports cessation since 2017 since valve replacement.             Cardiac/Vascular    Paroxysmal atrial fibrillation    Current Assessment & Plan     NSR on EKG. Having follow up with cardiology, and holding off on restarting amiodarone at this time. Will defer that tx to cardiology since he has not been on this medication for almost 3 years.          Relevant Medications    metoprolol tartrate (LOPRESSOR) 25 MG tablet    Other Relevant Orders    Ambulatory referral/consult to Cardiology    EKG 12-lead    Essential hypertension    Current Assessment & Plan     Has not seen cardiology in almost 3 years. No medications since then. Starting back metoprolol, and having pt follow up in 2 weeks for BP check. Also having pt follow up with cardiology for other underlying cardiac hx. Routine labs drawn today also.          Relevant Medications    atorvastatin (LIPITOR) 40 MG tablet    Other Relevant Orders    Lipid panel    Aortic stenosis    Current Assessment & Plan     Sending referral for pt to follow up and reestablish care with cardiology. Has seen Dr. Cook in past following valve replacement. Also starting back on plavix, asa, lipitor, and metoprolol. Will defer the starting of amiodarone to cardiology. Also ordered echo and lipid panel.           Relevant Medications    atorvastatin (LIPITOR) 40 MG tablet    Other Relevant Orders    Ambulatory referral/consult to Cardiology    EKG 12-lead    Echo Color Flow Doppler? Yes    S/P AVR (aortic valve replacement)    Current Assessment & Plan     Sending referral for pt to follow up and reestablish care with cardiology. Has seen Dr. Cook in past following valve replacement. Also starting back on plavix, asa, lipitor, and metoprolol. Will defer the starting of amiodarone to cardiology.          Relevant Medications    clopidogreL (PLAVIX) 75 mg tablet    Other Relevant Orders    Ambulatory referral/consult to Cardiology    EKG 12-lead    Echo Color Flow Doppler? Yes       GI    Lower abdominal pain - Primary    Current Assessment & Plan     Seems to have umbilical hernia on exam, but will do abd x ray to r/o SBO. Denies any N/V/D. Abdominal exam relatively unremarkable other than mild tenderness noted to periumbilical area while lying down.          Relevant Orders    X-Ray Abdomen Flat And Erect    CBC auto differential    Comprehensive metabolic panel       Other    Tobacco use    Current Assessment & Plan     Counseled on importance of smoke cessation, especially considering his significant cardiac medical hx in order to reduce risk of future comorbidities and overall improvement of quality of life. Sending referral to smoke cessation program.          Relevant Orders    Ambulatory referral/consult to Smoking Cessation Program        Follow up in about 2 weeks (around 3/30/2020) for hypertension.

## 2020-03-16 NOTE — PATIENT INSTRUCTIONS
Abdominal Pain    Abdominal pain is pain in the stomach or belly area. Everyone has this pain from time to time. In many cases it goes away on its own. But abdominal pain can sometimes be due to a serious problem, such as appendicitis. So its important to know when to seek help.  Causes of abdominal pain  There are many possible causes of abdominal pain. Common causes in adults include:  · Constipation, diarrhea, or gas  · Stomach acid flowing back up into the esophagus (acid reflux or heartburn)  · Severe acid reflux, called GERD (gastroesophageal reflux disease)  · A sore in the lining of the stomach or small intestine (peptic ulcer)  · Inflammation of the gallbladder, liver, or pancreas  · Gallstones or kidney stones  · Appendicitis   · Intestinal blockage   · An internal organ pushing through a muscle or other tissue (hernia)  · Urinary tract infections  · In women, menstrual cramps, fibroids, or endometriosis  · Inflammation or infection of the intestines  Diagnosing the cause of abdominal pain  Your healthcare provider will do a physical exam help find the cause of your pain. If needed, tests will be ordered. Belly pain has many possible causes. So it can be hard to find the reason for your pain. Giving details about your pain can help. Tell your provider where and when you feel the pain, and what makes it better or worse. Also let your provider know if you have other symptoms such as:  · Fever  · Tiredness  · Upset stomach (nausea)  · Vomiting  · Changes in bathroom habits  Treating abdominal pain  Some causes of pain need emergency medical treatment right away. These include appendicitis or a bowel blockage. Other problems can be treated with rest, fluids, or medicines. Your healthcare provider can give you specific instructions for treatment or self-care based on what is causing your pain.  If you have vomiting or diarrhea, sip water or other clear fluids. When you are ready to eat solid foods again,  start with small amounts of easy-to-digest, low-fat foods. These include apple sauce, toast, or crackers.   When to seek medical care  Call 911 or go to the hospital right away if you:  · Cant pass stool and are vomiting  · Are vomiting blood or have bloody diarrhea or black, tarry diarrhea  · Have chest, neck, or shoulder pain  · Feel like you might pass out  · Have pain in your shoulder blades with nausea  · Have sudden, severe belly pain  · Have new, severe pain unlike any you have felt before  · Have a belly that is rigid, hard, and tender to touch  Call your healthcare provider if you have:  · Pain for more than 5 days  · Bloating for more than 2 days  · Diarrhea for more than 5 days  · A fever of 100.4°F (38.0°C) or higher, or as directed by your provider  · Pain that gets worse  · Weight loss for no reason  · Continued lack of appetite  · Blood in your stool  How to prevent abdominal pain  Here are some tips to help prevent abdominal pain:  · Eat smaller amounts of food at one time.  · Avoid greasy, fried, or other high-fat foods.  · Avoid foods that give you gas.  · Exercise regularly.  · Drink plenty of fluids.  To help prevent GERD symptoms:  · Quit smoking.  · Reduce alcohol and certain foods that increase stomach acid.  · Avoid aspirin and over-the-counter pain and fever medicines (NSAIDS or nonsteroidal anti-inflammatory drugs), if possible  · Lose extra weight.  · Finish eating at least 2 hours before you go to bed or lie down.  · Raise the head of your bed.  Date Last Reviewed: 7/1/2016  © 8194-4106 [employer]. [address] [place] [address], [place] [address]. All rights reserved. This information is not intended as a substitute for professional medical care. Always follow your healthcare professional's instructions.

## 2020-03-16 NOTE — ASSESSMENT & PLAN NOTE
Sending referral for pt to follow up and reestablish care with cardiology. Has seen Dr. Cook in past following valve replacement. Also starting back on plavix, asa, lipitor, and metoprolol. Will defer the starting of amiodarone to cardiology.

## 2020-03-16 NOTE — ASSESSMENT & PLAN NOTE
Has not seen cardiology in almost 3 years. No medications since then. Starting back metoprolol, and having pt follow up in 2 weeks for BP check. Also having pt follow up with cardiology for other underlying cardiac hx. Routine labs drawn today also.

## 2020-03-16 NOTE — ASSESSMENT & PLAN NOTE
Sending referral for pt to follow up and reestablish care with cardiology. Has seen Dr. Cook in past following valve replacement. Also starting back on plavix, asa, lipitor, and metoprolol. Will defer the starting of amiodarone to cardiology. Also ordered echo and lipid panel.

## 2020-03-16 NOTE — ASSESSMENT & PLAN NOTE
Counseled on importance of smoke cessation, especially considering his significant cardiac medical hx in order to reduce risk of future comorbidities and overall improvement of quality of life. Sending referral to smoke cessation program.

## 2020-03-16 NOTE — ASSESSMENT & PLAN NOTE
Seems to have umbilical hernia on exam, but will do abd x ray to r/o SBO. Denies any N/V/D. Abdominal exam relatively unremarkable other than mild tenderness noted to periumbilical area while lying down.

## 2020-03-16 NOTE — ASSESSMENT & PLAN NOTE
NSR on EKG. Having follow up with cardiology, and holding off on restarting amiodarone at this time. Will defer that tx to cardiology since he has not been on this medication for almost 3 years.

## 2020-03-17 ENCOUNTER — TELEPHONE (OUTPATIENT)
Dept: INTERNAL MEDICINE | Facility: CLINIC | Age: 60
End: 2020-03-17

## 2020-03-18 NOTE — PROGRESS NOTES
Call and spoke with patient, stated he had couple of bowel movement and a lot of gas,but he is feeling much better. The knot is also gone

## 2020-03-18 NOTE — TELEPHONE ENCOUNTER
Attempted to call pt with results and assess how he was feeling. No answer at this time, and unable to leave VM.

## 2020-03-23 DIAGNOSIS — R74.8 ELEVATED LIVER ENZYMES: Primary | ICD-10-CM

## 2020-12-25 NOTE — PLAN OF CARE
CM visit patient this am, discussed discharge plan home with family. Patient continues to refuse HH. Denies post hospital service needs at this time.      06/05/17 1432   Discharge Reassessment   Assessment Type Discharge Planning Reassessment   Can the patient answer the patient profile reliably? Yes, cognitively intact   How does the patient rate their overall health at the present time? Good   Describe the patient's ability to walk at the present time. Minor restrictions or changes   How often would a person be available to care for the patient? Infrequently   Discharge plan remains the same: Yes   Discharge Plan A Home with family   Discharge Plan B Home with family       
Optimal discharge planning undetermined at this due to medical status. Patient remains intubated, no family present. CM will continue to assess.     05/31/17 1541   Discharge Assessment   Assessment Type Discharge Planning Assessment   Assessment information obtained from? Medical Record   Expected Length of Stay (days) 7   Current cognitive status: Coma/Sedated/Intubated   Current Functional Status: Assistive Equipment;Completely Dependent   Arrived From admitted as an inpatient;home or self-care   Lives With other (see comments)   Able to Return to Prior Arrangements unable to determine at this time (comments)   Is patient able to care for self after discharge? Unable to determine at this time (comments)   How many people do you have in your home that can help with your care after discharge? other (see comments)   Patient currently being followed by outpatient case management? Unable to determine (comments)   Do you have any problems affording any of your prescribed medications? TBD   Discharge Plan A Other   Patient/Family In Agreement With Plan other (see comments)     
Problem: Patient Care Overview  Goal: Plan of Care Review  Outcome: Ongoing (interventions implemented as appropriate)  MAX/MODA FOR SUP TO SIT TF, ' WITH CGA      
Problem: Patient Care Overview  Goal: Plan of Care Review  Outcome: Ongoing (interventions implemented as appropriate)  Patient has been doing well today and is hopeful that his chest tubes will be removed soon. He has minimal complaints of pain which are easily controled with patient medication. He uses the IS well and ambulated independently. Patient has had a good oral intake. No bleeding or infection noted to incision site.       
Problem: Patient Care Overview  Goal: Plan of Care Review  Outcome: Ongoing (interventions implemented as appropriate)  Patient remains free from falls. Fall precautions remain in place. Pain controlled with PRN pain medication. Midline incision CDI. NSR 60-75 on cardiac monitor. Ambulating in hallways independently. Sternal precautions in place. VS are stable. No other c/o at this time. Call bell within reach. Reminded to call for assistance.       
Problem: Patient Care Overview  Goal: Plan of Care Review  Outcome: Ongoing (interventions implemented as appropriate)  Patient remains free from falls. Fall precautions remain in place. Pain controlled with PRN pain medication. Midline incision CDI. Pt received 2 units PRBC today, tolerated well. Ambulating without assistance. Sternal precautions in place. BG monitored, no correction dose needed. Converted to afib at 2305. Rate ranging from . All other VS are stable. No other c/o at this time. Call bell within reach. Reminded to call for assistance.       
Problem: Patient Care Overview  Goal: Plan of Care Review  Outcome: Ongoing (interventions implemented as appropriate)  Patient remains free from falls. Fall precautions remain in place. Pain controlled with PRN pain medication. Sternal precautions in place. HR 65-78 NSR on cardiac monitor. VS are stable. No other c/o at this time. Call bell within reach. Reminded to call for assistance.       
Problem: Patient Care Overview  Goal: Plan of Care Review  Outcome: Ongoing (interventions implemented as appropriate)  Patient resting off and on in between care tonight.  Rhythm changed to Afib with HR in 130's last night appx 2200, metoprolol IV given x 2 with no effect and amiodarone gtt started per MD Joyce (see previous note).  Patient remains in Afib at this time with -120 since amiodarone started.  Chest tube to suction per orders and drainage decreased from previous shift, averaging 20 mL/hr.  Midline chest incision clean, dry and intact.  Patient cooperating with post op rehab, sternal precautions reinforced and IS being used by pt independently.  O2 sats >92% via pulse oximetry on 4lpm NC.  Urine output adequate.  Insulin gtt being titrated per orders with cap blood glucose averaging  so far this shift.  Patient updated to plan of care, no questions at this time.      
Problem: Patient Care Overview  Goal: Plan of Care Review  Outcome: Ongoing (interventions implemented as appropriate)  Pt alert and oriented, rested during shift. C/o 5-7/10 chest soreness, PRN pain medication administered per order parameters. Midline incision steri-strips CDI. CT dressing CDI, small amount serous output- placed to H20 seal during shift per MD order, CXRAY in AM. Encouraged IS, sternal precautions maintained, cough encouraged. Cardizem gtt D/C, PO Cardizem administered. IV lasix, voids to urinal. BG monitored, correction dose required. AM bath performed, up in chair. Pt ambulated in room with stand by assist, remained free of falls during shift, call light in reach, room free of clutter, side rails up X2, pt on telemetry monitor SR 60-70's, will continue to monitor.         
Problem: Patient Care Overview  Goal: Plan of Care Review  Outcome: Ongoing (interventions implemented as appropriate)  Pt doing better this afternoon.  Pt on very minimal dose of levophed to keep sbp > 90.  Pt extubated at approximately 1720.  Pt tolerating NC well.  Morphine given for pain as ordered.  Chest tube output slower in the past 3 hours than from the first 4 hours.  Pt received blood products and tolerated well.  See flowsheet.        
Problem: Patient Care Overview  Goal: Plan of Care Review  Outcome: Ongoing (interventions implemented as appropriate)  Pt on room air tolerating well. No distress noted at this time.       
Problem: Patient Care Overview  Goal: Plan of Care Review  Outcome: Ongoing (interventions implemented as appropriate)  Pt s/p AVR, CT x2, c/o pain, frequent IV Morphine and Norco po, remains on insulin gtt.  BP elevated, Cardene gtt maxed at present, 's - 130's currently.      
Problem: Patient Care Overview  Goal: Plan of Care Review  Outcome: Ongoing (interventions implemented as appropriate)  Pt stable. Pt is NSR on the heart monitor.  Pt c/o mild pain to midline incision, prn pain med given.  Pt ambulated in halls independently following sternal precautions.  Plan of care reviewed.  Pt verbalizes understanding.  Pt was free from falls or injuries during duration of shift.  Pt turned and repositioned self, pt encouraged to elevate legs to decrease swelling.  PIV intact with no redness, swelling or drainage.  Bed low, wheels locked, bed alarm on, call light in reach.  Pt instructed to call for assistance.  Will continue to monitor.        
Problem: Patient Care Overview  Goal: Plan of Care Review  Outcome: Ongoing (interventions implemented as appropriate)  Pt. Is aware of plan of care to receive 2 units of blood today, with possible DC pending tomorrow depending on lab results in the am.  Pt. Has been ambulating in the hallways independently and has had no complaints.  Pt. Still has some peripheral dependent edema in legs, but lungs are clear and he has been on RA.  Will continue to monitor.        
Problem: Patient Care Overview  Goal: Plan of Care Review  Outcome: Ongoing (interventions implemented as appropriate)  Pt. Is aware of plan of care. Incision care, sternal precautions, ambulation in cowan TID, OOB to chair with meals TID, labs, vitals, pain control.  Pt. Has had no complaints since transfer from ICU.        
Problem: Patient Care Overview  Goal: Plan of Care Review  Outcome: Ongoing (interventions implemented as appropriate)  Received two units of PRBCs today.  One occurrence of low BP S/P antihypertensives and lasix administration, BP meds adjusted.  Pain controlled with Hydrocodone, see eMAR.  Remains on insulin gtt.  CT and schaefer remain.      
Problem: Patient Care Overview  Goal: Plan of Care Review  Outcome: Outcome(s) achieved Date Met: 06/05/17  D/C PT [facility]      
Problem: Patient Care Overview  Goal: Plan of Care Review  Pt on RA. O2 sat adequate. No complaints of SOB.      
none

## 2021-03-22 PROBLEM — R00.0 TACHYCARDIA: Status: ACTIVE | Noted: 2021-03-22

## 2021-03-22 PROBLEM — D72.819 LEUKOPENIA: Status: ACTIVE | Noted: 2021-03-22

## 2021-03-22 PROBLEM — I48.19 PERSISTENT ATRIAL FIBRILLATION WITH RVR: Status: ACTIVE | Noted: 2017-06-02

## 2021-03-22 PROBLEM — N50.89 SCROTAL EDEMA: Status: ACTIVE | Noted: 2021-03-22

## 2021-03-22 PROBLEM — R79.89 ELEVATED BRAIN NATRIURETIC PEPTIDE (BNP) LEVEL: Status: ACTIVE | Noted: 2021-03-22

## 2021-03-23 ENCOUNTER — HOSPITAL ENCOUNTER (INPATIENT)
Facility: HOSPITAL | Age: 61
LOS: 2 days | Discharge: HOME | DRG: 308 | End: 2021-03-25
Attending: EMERGENCY MEDICINE | Admitting: INTERNAL MEDICINE
Payer: MEDICAID

## 2021-03-23 DIAGNOSIS — I10 ESSENTIAL HYPERTENSION: ICD-10-CM

## 2021-03-23 DIAGNOSIS — R07.9 CHEST PAIN: ICD-10-CM

## 2021-03-23 DIAGNOSIS — I48.91 AFIB: ICD-10-CM

## 2021-03-23 DIAGNOSIS — R00.2 PALPITATIONS: ICD-10-CM

## 2021-03-23 DIAGNOSIS — I48.91 ATRIAL FIBRILLATION: ICD-10-CM

## 2021-03-23 DIAGNOSIS — I35.0 AORTIC VALVE STENOSIS, ETIOLOGY OF CARDIAC VALVE DISEASE UNSPECIFIED: ICD-10-CM

## 2021-03-23 DIAGNOSIS — I48.91 ATRIAL FIBRILLATION WITH RVR: ICD-10-CM

## 2021-03-23 DIAGNOSIS — N50.89 SCROTAL EDEMA: Primary | ICD-10-CM

## 2021-03-23 DIAGNOSIS — I48.91 A-FIB: ICD-10-CM

## 2021-03-23 DIAGNOSIS — I48.0 PAROXYSMAL ATRIAL FIBRILLATION: ICD-10-CM

## 2021-03-23 DIAGNOSIS — B19.20 HEPATITIS C VIRUS INFECTION WITHOUT HEPATIC COMA, UNSPECIFIED CHRONICITY: ICD-10-CM

## 2021-03-23 DIAGNOSIS — Z95.2 S/P AVR (AORTIC VALVE REPLACEMENT): ICD-10-CM

## 2021-03-23 DIAGNOSIS — R00.0 TACHYCARDIA: ICD-10-CM

## 2021-03-23 DIAGNOSIS — R60.9 EDEMA: ICD-10-CM

## 2021-03-23 PROBLEM — E66.01 MORBID OBESITY: Status: ACTIVE | Noted: 2021-03-23

## 2021-03-23 PROBLEM — R79.89 TROPONIN LEVEL ELEVATED: Status: RESOLVED | Noted: 2017-04-06 | Resolved: 2021-03-23

## 2021-03-23 PROBLEM — R10.30 LOWER ABDOMINAL PAIN: Status: RESOLVED | Noted: 2020-03-16 | Resolved: 2021-03-23

## 2021-03-23 PROBLEM — D72.819 LEUKOPENIA: Status: RESOLVED | Noted: 2021-03-22 | Resolved: 2021-03-23

## 2021-03-23 PROBLEM — F14.10 COCAINE ABUSE: Status: RESOLVED | Noted: 2017-04-06 | Resolved: 2021-03-23

## 2021-03-23 PROBLEM — D72.829 LEUKOCYTOSIS: Status: RESOLVED | Noted: 2017-06-02 | Resolved: 2021-03-23

## 2021-03-23 PROBLEM — I35.9 AORTIC VALVE DISEASE: Status: RESOLVED | Noted: 2017-05-31 | Resolved: 2021-03-23

## 2021-03-23 PROBLEM — R73.9 ACUTE HYPERGLYCEMIA: Status: RESOLVED | Noted: 2017-05-31 | Resolved: 2021-03-23

## 2021-03-23 PROBLEM — D62 ACUTE BLOOD LOSS ANEMIA: Status: RESOLVED | Noted: 2017-05-31 | Resolved: 2021-03-23

## 2021-03-23 PROBLEM — I48.19 PERSISTENT ATRIAL FIBRILLATION WITH RVR: Status: RESOLVED | Noted: 2017-06-02 | Resolved: 2021-03-23

## 2021-03-23 PROBLEM — Z78.9 ALCOHOL USE: Status: RESOLVED | Noted: 2017-04-06 | Resolved: 2021-03-23

## 2021-03-23 LAB
ALBUMIN SERPL BCP-MCNC: 2.5 G/DL (ref 3.5–5.2)
ALP SERPL-CCNC: 108 U/L (ref 55–135)
ALT SERPL W/O P-5'-P-CCNC: 42 U/L (ref 10–44)
ANION GAP SERPL CALC-SCNC: 9 MMOL/L (ref 8–16)
AORTIC ROOT ANNULUS: 3.38 CM
APTT BLDCRRT: 108.2 SEC (ref 21–32)
APTT BLDCRRT: 39.9 SEC (ref 21–32)
APTT BLDCRRT: 81.5 SEC (ref 21–32)
ASCENDING AORTA: 3.98 CM
AST SERPL-CCNC: 92 U/L (ref 10–40)
AV INDEX (PROSTH): 0.51
AV MEAN GRADIENT: 32 MMHG
AV PEAK GRADIENT: 53 MMHG
AV VALVE AREA: 1.63 CM2
AV VELOCITY RATIO: 0.42
BASOPHILS # BLD AUTO: 0.08 K/UL (ref 0–0.2)
BASOPHILS # BLD AUTO: 0.08 K/UL (ref 0–0.2)
BASOPHILS NFR BLD: 2.3 % (ref 0–1.9)
BASOPHILS NFR BLD: 2.7 % (ref 0–1.9)
BILIRUB SERPL-MCNC: 2 MG/DL (ref 0.1–1)
BILIRUB UR QL STRIP: NEGATIVE
BNP SERPL-MCNC: 249 PG/ML (ref 0–99)
BSA FOR ECHO PROCEDURE: 2.46 M2
BUN SERPL-MCNC: 6 MG/DL (ref 8–23)
CALCIUM SERPL-MCNC: 8.5 MG/DL (ref 8.7–10.5)
CHLORIDE SERPL-SCNC: 97 MMOL/L (ref 95–110)
CLARITY UR: CLEAR
CO2 SERPL-SCNC: 32 MMOL/L (ref 23–29)
COLOR UR: YELLOW
CREAT SERPL-MCNC: 0.7 MG/DL (ref 0.5–1.4)
CTP QC/QA: YES
CV ECHO LV RWT: 0.86 CM
DIFFERENTIAL METHOD: ABNORMAL
DIFFERENTIAL METHOD: ABNORMAL
DOP CALC AO PEAK VEL: 3.63 M/S
DOP CALC AO VTI: 58.98 CM
DOP CALC LVOT AREA: 3.2 CM2
DOP CALC LVOT DIAMETER: 2.01 CM
DOP CALC LVOT PEAK VEL: 1.52 M/S
DOP CALC LVOT STROKE VOLUME: 96 CM3
DOP CALC RVOT PEAK VEL: 0.91 M/S
DOP CALC RVOT VTI: 17.31 CM
DOP CALCLVOT PEAK VEL VTI: 30.27 CM
E WAVE DECELERATION TIME: 251.42 MSEC
E/A RATIO: 1.03
ECHO LV POSTERIOR WALL: 1.76 CM (ref 0.6–1.1)
EOSINOPHIL # BLD AUTO: 0.1 K/UL (ref 0–0.5)
EOSINOPHIL # BLD AUTO: 0.1 K/UL (ref 0–0.5)
EOSINOPHIL NFR BLD: 1.4 % (ref 0–8)
EOSINOPHIL NFR BLD: 2.7 % (ref 0–8)
ERYTHROCYTE [DISTWIDTH] IN BLOOD BY AUTOMATED COUNT: 14 % (ref 11.5–14.5)
ERYTHROCYTE [DISTWIDTH] IN BLOOD BY AUTOMATED COUNT: 14.1 % (ref 11.5–14.5)
EST. GFR  (AFRICAN AMERICAN): >60 ML/MIN/1.73 M^2
EST. GFR  (NON AFRICAN AMERICAN): >60 ML/MIN/1.73 M^2
FRACTIONAL SHORTENING: 30 % (ref 28–44)
GLUCOSE SERPL-MCNC: 108 MG/DL (ref 70–110)
GLUCOSE UR QL STRIP: NEGATIVE
HCT VFR BLD AUTO: 33.8 % (ref 40–54)
HCT VFR BLD AUTO: 34.5 % (ref 40–54)
HCV AB SERPL QL IA: POSITIVE
HGB BLD-MCNC: 11.1 G/DL (ref 14–18)
HGB BLD-MCNC: 11.3 G/DL (ref 14–18)
HGB UR QL STRIP: NEGATIVE
HIV 1+2 AB+HIV1 P24 AG SERPL QL IA: NEGATIVE
IMM GRANULOCYTES # BLD AUTO: 0.01 K/UL (ref 0–0.04)
IMM GRANULOCYTES # BLD AUTO: 0.02 K/UL (ref 0–0.04)
IMM GRANULOCYTES NFR BLD AUTO: 0.3 % (ref 0–0.5)
IMM GRANULOCYTES NFR BLD AUTO: 0.6 % (ref 0–0.5)
INR PPP: 1.8 (ref 0.8–1.2)
INTERVENTRICULAR SEPTUM: 1.65 CM (ref 0.6–1.1)
IVRT: 32.3 MSEC
KETONES UR QL STRIP: NEGATIVE
LA MAJOR: 5.1 CM
LA MINOR: 3.23 CM
LA WIDTH: 3.34 CM
LEFT ATRIUM SIZE: 3.92 CM
LEFT ATRIUM VOLUME INDEX: 18.6 ML/M2
LEFT ATRIUM VOLUME: 44.02 CM3
LEFT INTERNAL DIMENSION IN SYSTOLE: 2.88 CM (ref 2.1–4)
LEFT VENTRICLE DIASTOLIC VOLUME INDEX: 31.49 ML/M2
LEFT VENTRICLE DIASTOLIC VOLUME: 74.63 ML
LEFT VENTRICLE MASS INDEX: 125 G/M2
LEFT VENTRICLE SYSTOLIC VOLUME INDEX: 13.4 ML/M2
LEFT VENTRICLE SYSTOLIC VOLUME: 31.8 ML
LEFT VENTRICULAR INTERNAL DIMENSION IN DIASTOLE: 4.11 CM (ref 3.5–6)
LEFT VENTRICULAR MASS: 296.65 G
LEUKOCYTE ESTERASE UR QL STRIP: NEGATIVE
LV LATERAL E/E' RATIO: 9.93 M/S
LYMPHOCYTES # BLD AUTO: 0.9 K/UL (ref 1–4.8)
LYMPHOCYTES # BLD AUTO: 1 K/UL (ref 1–4.8)
LYMPHOCYTES NFR BLD: 28.1 % (ref 18–48)
LYMPHOCYTES NFR BLD: 31.4 % (ref 18–48)
MCH RBC QN AUTO: 35.2 PG (ref 27–31)
MCH RBC QN AUTO: 35.9 PG (ref 27–31)
MCHC RBC AUTO-ENTMCNC: 32.8 G/DL (ref 32–36)
MCHC RBC AUTO-ENTMCNC: 32.8 G/DL (ref 32–36)
MCV RBC AUTO: 108 FL (ref 82–98)
MCV RBC AUTO: 109 FL (ref 82–98)
MONOCYTES # BLD AUTO: 0.5 K/UL (ref 0.3–1)
MONOCYTES # BLD AUTO: 0.5 K/UL (ref 0.3–1)
MONOCYTES NFR BLD: 13.9 % (ref 4–15)
MONOCYTES NFR BLD: 16.2 % (ref 4–15)
MV PEAK A VEL: 1.44 M/S
MV PEAK E VEL: 1.49 M/S
MV STENOSIS PRESSURE HALF TIME: 72.91 MS
MV VALVE AREA P 1/2 METHOD: 3.02 CM2
NEUTROPHILS # BLD AUTO: 1.4 K/UL (ref 1.8–7.7)
NEUTROPHILS # BLD AUTO: 1.9 K/UL (ref 1.8–7.7)
NEUTROPHILS NFR BLD: 46.7 % (ref 38–73)
NEUTROPHILS NFR BLD: 53.7 % (ref 38–73)
NITRITE UR QL STRIP: NEGATIVE
NRBC BLD-RTO: 0 /100 WBC
NRBC BLD-RTO: 0 /100 WBC
PH UR STRIP: 8 [PH] (ref 5–8)
PLATELET # BLD AUTO: 65 K/UL (ref 150–350)
PLATELET # BLD AUTO: 71 K/UL (ref 150–350)
PMV BLD AUTO: 9.6 FL (ref 9.2–12.9)
PMV BLD AUTO: 9.6 FL (ref 9.2–12.9)
POTASSIUM SERPL-SCNC: 4.5 MMOL/L (ref 3.5–5.1)
PROT SERPL-MCNC: 8.2 G/DL (ref 6–8.4)
PROT UR QL STRIP: NEGATIVE
PROTHROMBIN TIME: 19.4 SEC (ref 9–12.5)
PV MEAN GRADIENT: 2 MMHG
RA MAJOR: 4.84 CM
RA PRESSURE: 15 MMHG
RA WIDTH: 4.14 CM
RBC # BLD AUTO: 3.09 M/UL (ref 4.6–6.2)
RBC # BLD AUTO: 3.21 M/UL (ref 4.6–6.2)
SARS-COV-2 RDRP RESP QL NAA+PROBE: NEGATIVE
SINUS: 3.93 CM
SODIUM SERPL-SCNC: 138 MMOL/L (ref 136–145)
SP GR UR STRIP: 1.01 (ref 1–1.03)
STJ: 4.08 CM
TDI LATERAL: 0.15 M/S
TRICUSPID ANNULAR PLANE SYSTOLIC EXCURSION: 2.09 CM
TROPONIN I SERPL DL<=0.01 NG/ML-MCNC: 0.02 NG/ML (ref 0–0.03)
URN SPEC COLLECT METH UR: ABNORMAL
UROBILINOGEN UR STRIP-ACNC: >=8 EU/DL
WBC # BLD AUTO: 2.96 K/UL (ref 3.9–12.7)
WBC # BLD AUTO: 3.45 K/UL (ref 3.9–12.7)

## 2021-03-23 PROCEDURE — 25000003 PHARM REV CODE 250: Performed by: NURSE PRACTITIONER

## 2021-03-23 PROCEDURE — 27000221 HC OXYGEN, UP TO 24 HOURS

## 2021-03-23 PROCEDURE — 86703 HIV-1/HIV-2 1 RESULT ANTBDY: CPT | Performed by: EMERGENCY MEDICINE

## 2021-03-23 PROCEDURE — 63600175 PHARM REV CODE 636 W HCPCS: Performed by: NURSE PRACTITIONER

## 2021-03-23 PROCEDURE — S4991 NICOTINE PATCH NONLEGEND: HCPCS | Performed by: NURSE PRACTITIONER

## 2021-03-23 PROCEDURE — 99285 EMERGENCY DEPT VISIT HI MDM: CPT | Mod: 25

## 2021-03-23 PROCEDURE — 96376 TX/PRO/DX INJ SAME DRUG ADON: CPT

## 2021-03-23 PROCEDURE — 21400001 HC TELEMETRY ROOM

## 2021-03-23 PROCEDURE — 93010 ELECTROCARDIOGRAM REPORT: CPT | Mod: ,,, | Performed by: INTERNAL MEDICINE

## 2021-03-23 PROCEDURE — 84484 ASSAY OF TROPONIN QUANT: CPT | Performed by: NURSE PRACTITIONER

## 2021-03-23 PROCEDURE — 25000003 PHARM REV CODE 250: Performed by: INTERNAL MEDICINE

## 2021-03-23 PROCEDURE — 96365 THER/PROPH/DIAG IV INF INIT: CPT

## 2021-03-23 PROCEDURE — 86803 HEPATITIS C AB TEST: CPT | Performed by: EMERGENCY MEDICINE

## 2021-03-23 PROCEDURE — 96375 TX/PRO/DX INJ NEW DRUG ADDON: CPT

## 2021-03-23 PROCEDURE — G0378 HOSPITAL OBSERVATION PER HR: HCPCS

## 2021-03-23 PROCEDURE — 96366 THER/PROPH/DIAG IV INF ADDON: CPT

## 2021-03-23 PROCEDURE — 85025 COMPLETE CBC W/AUTO DIFF WBC: CPT | Mod: 91 | Performed by: NURSE PRACTITIONER

## 2021-03-23 PROCEDURE — 36415 COLL VENOUS BLD VENIPUNCTURE: CPT | Performed by: INTERNAL MEDICINE

## 2021-03-23 PROCEDURE — 36415 COLL VENOUS BLD VENIPUNCTURE: CPT | Performed by: NURSE PRACTITIONER

## 2021-03-23 PROCEDURE — 80053 COMPREHEN METABOLIC PANEL: CPT | Performed by: NURSE PRACTITIONER

## 2021-03-23 PROCEDURE — 81003 URINALYSIS AUTO W/O SCOPE: CPT | Performed by: NURSE PRACTITIONER

## 2021-03-23 PROCEDURE — 99900035 HC TECH TIME PER 15 MIN (STAT)

## 2021-03-23 PROCEDURE — 85730 THROMBOPLASTIN TIME PARTIAL: CPT | Mod: 91 | Performed by: INTERNAL MEDICINE

## 2021-03-23 PROCEDURE — 99232 SBSQ HOSP IP/OBS MODERATE 35: CPT | Mod: ,,, | Performed by: INTERNAL MEDICINE

## 2021-03-23 PROCEDURE — 96365 THER/PROPH/DIAG IV INF INIT: CPT | Mod: 59

## 2021-03-23 PROCEDURE — 85610 PROTHROMBIN TIME: CPT | Performed by: NURSE PRACTITIONER

## 2021-03-23 PROCEDURE — 83880 ASSAY OF NATRIURETIC PEPTIDE: CPT | Performed by: NURSE PRACTITIONER

## 2021-03-23 PROCEDURE — 94640 AIRWAY INHALATION TREATMENT: CPT

## 2021-03-23 PROCEDURE — 85730 THROMBOPLASTIN TIME PARTIAL: CPT | Performed by: NURSE PRACTITIONER

## 2021-03-23 PROCEDURE — 85025 COMPLETE CBC W/AUTO DIFF WBC: CPT | Performed by: NURSE PRACTITIONER

## 2021-03-23 PROCEDURE — 93010 EKG 12-LEAD: ICD-10-PCS | Mod: ,,, | Performed by: INTERNAL MEDICINE

## 2021-03-23 PROCEDURE — 99232 PR SUBSEQUENT HOSPITAL CARE,LEVL II: ICD-10-PCS | Mod: ,,, | Performed by: INTERNAL MEDICINE

## 2021-03-23 PROCEDURE — 25000242 PHARM REV CODE 250 ALT 637 W/ HCPCS: Performed by: NURSE PRACTITIONER

## 2021-03-23 PROCEDURE — 87522 HEPATITIS C REVRS TRNSCRPJ: CPT | Performed by: EMERGENCY MEDICINE

## 2021-03-23 PROCEDURE — U0002 COVID-19 LAB TEST NON-CDC: HCPCS | Performed by: NURSE PRACTITIONER

## 2021-03-23 PROCEDURE — 93005 ELECTROCARDIOGRAM TRACING: CPT

## 2021-03-23 RX ORDER — LOSARTAN POTASSIUM 25 MG/1
25 TABLET ORAL DAILY
Status: DISCONTINUED | OUTPATIENT
Start: 2021-03-23 | End: 2021-03-23

## 2021-03-23 RX ORDER — DILTIAZEM HCL/D5W 125 MG/125
5 PLASTIC BAG, INJECTION (ML) INTRAVENOUS CONTINUOUS
Status: DISCONTINUED | OUTPATIENT
Start: 2021-03-23 | End: 2021-03-24

## 2021-03-23 RX ORDER — IBUPROFEN 200 MG
1 TABLET ORAL DAILY
Status: DISCONTINUED | OUTPATIENT
Start: 2021-03-23 | End: 2021-03-25 | Stop reason: HOSPADM

## 2021-03-23 RX ORDER — DILTIAZEM HYDROCHLORIDE 5 MG/ML
10 INJECTION INTRAVENOUS
Status: COMPLETED | OUTPATIENT
Start: 2021-03-23 | End: 2021-03-23

## 2021-03-23 RX ORDER — ASPIRIN 325 MG
325 TABLET ORAL
Status: COMPLETED | OUTPATIENT
Start: 2021-03-23 | End: 2021-03-23

## 2021-03-23 RX ORDER — IPRATROPIUM BROMIDE AND ALBUTEROL SULFATE 2.5; .5 MG/3ML; MG/3ML
3 SOLUTION RESPIRATORY (INHALATION)
Status: COMPLETED | OUTPATIENT
Start: 2021-03-23 | End: 2021-03-23

## 2021-03-23 RX ORDER — HEPARIN SODIUM,PORCINE/D5W 25000/250
0-40 INTRAVENOUS SOLUTION INTRAVENOUS CONTINUOUS
Status: DISCONTINUED | OUTPATIENT
Start: 2021-03-23 | End: 2021-03-24

## 2021-03-23 RX ORDER — SODIUM CHLORIDE 0.9 % (FLUSH) 0.9 %
10 SYRINGE (ML) INJECTION
Status: DISCONTINUED | OUTPATIENT
Start: 2021-03-23 | End: 2021-03-25 | Stop reason: HOSPADM

## 2021-03-23 RX ORDER — OXYCODONE HYDROCHLORIDE 5 MG/1
10 TABLET ORAL EVERY 6 HOURS PRN
Status: DISCONTINUED | OUTPATIENT
Start: 2021-03-23 | End: 2021-03-25 | Stop reason: HOSPADM

## 2021-03-23 RX ORDER — ACETAMINOPHEN 325 MG/1
650 TABLET ORAL EVERY 4 HOURS PRN
Status: DISCONTINUED | OUTPATIENT
Start: 2021-03-23 | End: 2021-03-25 | Stop reason: HOSPADM

## 2021-03-23 RX ORDER — FUROSEMIDE 10 MG/ML
60 INJECTION INTRAMUSCULAR; INTRAVENOUS EVERY 12 HOURS
Status: DISCONTINUED | OUTPATIENT
Start: 2021-03-23 | End: 2021-03-25 | Stop reason: HOSPADM

## 2021-03-23 RX ORDER — TALC
6 POWDER (GRAM) TOPICAL NIGHTLY PRN
Status: DISCONTINUED | OUTPATIENT
Start: 2021-03-23 | End: 2021-03-25 | Stop reason: HOSPADM

## 2021-03-23 RX ORDER — ONDANSETRON 2 MG/ML
4 INJECTION INTRAMUSCULAR; INTRAVENOUS EVERY 8 HOURS PRN
Status: DISCONTINUED | OUTPATIENT
Start: 2021-03-23 | End: 2021-03-25 | Stop reason: HOSPADM

## 2021-03-23 RX ORDER — POLYETHYLENE GLYCOL 3350 17 G/17G
17 POWDER, FOR SOLUTION ORAL DAILY PRN
Status: DISCONTINUED | OUTPATIENT
Start: 2021-03-23 | End: 2021-03-25 | Stop reason: HOSPADM

## 2021-03-23 RX ORDER — LOSARTAN POTASSIUM 25 MG/1
25 TABLET ORAL 2 TIMES DAILY
Status: DISCONTINUED | OUTPATIENT
Start: 2021-03-23 | End: 2021-03-25 | Stop reason: HOSPADM

## 2021-03-23 RX ORDER — NAPROXEN SODIUM 220 MG/1
81 TABLET, FILM COATED ORAL DAILY
Status: DISCONTINUED | OUTPATIENT
Start: 2021-03-23 | End: 2021-03-25 | Stop reason: HOSPADM

## 2021-03-23 RX ORDER — PROMETHAZINE HYDROCHLORIDE 25 MG/1
25 TABLET ORAL EVERY 6 HOURS PRN
Status: DISCONTINUED | OUTPATIENT
Start: 2021-03-23 | End: 2021-03-25 | Stop reason: HOSPADM

## 2021-03-23 RX ORDER — OXYCODONE HYDROCHLORIDE 5 MG/1
5 TABLET ORAL EVERY 6 HOURS PRN
Status: DISCONTINUED | OUTPATIENT
Start: 2021-03-23 | End: 2021-03-25 | Stop reason: HOSPADM

## 2021-03-23 RX ORDER — ATORVASTATIN CALCIUM 40 MG/1
40 TABLET, FILM COATED ORAL DAILY
Status: DISCONTINUED | OUTPATIENT
Start: 2021-03-23 | End: 2021-03-25 | Stop reason: HOSPADM

## 2021-03-23 RX ORDER — DILTIAZEM HCL 1 MG/ML
0-15 INJECTION, SOLUTION INTRAVENOUS CONTINUOUS
Status: DISCONTINUED | OUTPATIENT
Start: 2021-03-23 | End: 2021-03-23

## 2021-03-23 RX ORDER — FAMOTIDINE 20 MG/1
20 TABLET, FILM COATED ORAL EVERY 12 HOURS PRN
Status: DISCONTINUED | OUTPATIENT
Start: 2021-03-23 | End: 2021-03-25 | Stop reason: HOSPADM

## 2021-03-23 RX ADMIN — Medication 5 MG/HR: at 10:03

## 2021-03-23 RX ADMIN — IPRATROPIUM BROMIDE AND ALBUTEROL SULFATE 3 ML: .5; 2.5 SOLUTION RESPIRATORY (INHALATION) at 10:03

## 2021-03-23 RX ADMIN — LOSARTAN POTASSIUM 25 MG: 25 TABLET, FILM COATED ORAL at 02:03

## 2021-03-23 RX ADMIN — ATORVASTATIN CALCIUM 40 MG: 40 TABLET, FILM COATED ORAL at 02:03

## 2021-03-23 RX ADMIN — FUROSEMIDE 60 MG: 10 INJECTION, SOLUTION INTRAMUSCULAR; INTRAVENOUS at 05:03

## 2021-03-23 RX ADMIN — ASPIRIN 81 MG: 81 TABLET, CHEWABLE ORAL at 02:03

## 2021-03-23 RX ADMIN — Medication 6 MG: at 08:03

## 2021-03-23 RX ADMIN — Medication 10 MG/HR: at 12:03

## 2021-03-23 RX ADMIN — FUROSEMIDE 60 MG: 10 INJECTION, SOLUTION INTRAMUSCULAR; INTRAVENOUS at 11:03

## 2021-03-23 RX ADMIN — DILTIAZEM HYDROCHLORIDE 10 MG: 5 INJECTION INTRAVENOUS at 09:03

## 2021-03-23 RX ADMIN — HEPARIN SODIUM 12 UNITS/KG/HR: 10000 INJECTION, SOLUTION INTRAVENOUS at 03:03

## 2021-03-23 RX ADMIN — Medication 1 PATCH: at 12:03

## 2021-03-23 RX ADMIN — LOSARTAN POTASSIUM 25 MG: 25 TABLET, FILM COATED ORAL at 08:03

## 2021-03-23 RX ADMIN — ASPIRIN 325 MG ORAL TABLET 325 MG: 325 PILL ORAL at 09:03

## 2021-03-23 RX ADMIN — Medication 10 MG/HR: at 08:03

## 2021-03-24 PROBLEM — E87.6 HYPOKALEMIA: Status: ACTIVE | Noted: 2021-03-24

## 2021-03-24 LAB
ANION GAP SERPL CALC-SCNC: 7 MMOL/L (ref 8–16)
APTT BLDCRRT: 50.4 SEC (ref 21–32)
BASOPHILS # BLD AUTO: 0.09 K/UL (ref 0–0.2)
BASOPHILS NFR BLD: 2 % (ref 0–1.9)
BUN SERPL-MCNC: 11 MG/DL (ref 8–23)
CALCIUM SERPL-MCNC: 8 MG/DL (ref 8.7–10.5)
CHLORIDE SERPL-SCNC: 94 MMOL/L (ref 95–110)
CO2 SERPL-SCNC: 33 MMOL/L (ref 23–29)
CREAT SERPL-MCNC: 0.8 MG/DL (ref 0.5–1.4)
DIFFERENTIAL METHOD: ABNORMAL
EOSINOPHIL # BLD AUTO: 0.2 K/UL (ref 0–0.5)
EOSINOPHIL NFR BLD: 3.6 % (ref 0–8)
ERYTHROCYTE [DISTWIDTH] IN BLOOD BY AUTOMATED COUNT: 14 % (ref 11.5–14.5)
EST. GFR  (AFRICAN AMERICAN): >60 ML/MIN/1.73 M^2
EST. GFR  (NON AFRICAN AMERICAN): >60 ML/MIN/1.73 M^2
GLUCOSE SERPL-MCNC: 98 MG/DL (ref 70–110)
HCT VFR BLD AUTO: 31.6 % (ref 40–54)
HGB BLD-MCNC: 10.6 G/DL (ref 14–18)
IMM GRANULOCYTES # BLD AUTO: 0.02 K/UL (ref 0–0.04)
IMM GRANULOCYTES NFR BLD AUTO: 0.5 % (ref 0–0.5)
LYMPHOCYTES # BLD AUTO: 1.6 K/UL (ref 1–4.8)
LYMPHOCYTES NFR BLD: 36.7 % (ref 18–48)
MAGNESIUM SERPL-MCNC: 1.6 MG/DL (ref 1.6–2.6)
MCH RBC QN AUTO: 36.3 PG (ref 27–31)
MCHC RBC AUTO-ENTMCNC: 33.5 G/DL (ref 32–36)
MCV RBC AUTO: 108 FL (ref 82–98)
MONOCYTES # BLD AUTO: 0.7 K/UL (ref 0.3–1)
MONOCYTES NFR BLD: 16.2 % (ref 4–15)
NEUTROPHILS # BLD AUTO: 1.8 K/UL (ref 1.8–7.7)
NEUTROPHILS NFR BLD: 41 % (ref 38–73)
NRBC BLD-RTO: 0 /100 WBC
PHOSPHATE SERPL-MCNC: 4.2 MG/DL (ref 2.7–4.5)
PLATELET # BLD AUTO: 70 K/UL (ref 150–350)
PMV BLD AUTO: 9.7 FL (ref 9.2–12.9)
POTASSIUM SERPL-SCNC: 3.4 MMOL/L (ref 3.5–5.1)
RBC # BLD AUTO: 2.92 M/UL (ref 4.6–6.2)
SODIUM SERPL-SCNC: 134 MMOL/L (ref 136–145)
WBC # BLD AUTO: 4.44 K/UL (ref 3.9–12.7)

## 2021-03-24 PROCEDURE — 84100 ASSAY OF PHOSPHORUS: CPT | Performed by: NURSE PRACTITIONER

## 2021-03-24 PROCEDURE — 99233 PR SUBSEQUENT HOSPITAL CARE,LEVL III: ICD-10-PCS | Mod: ,,, | Performed by: INTERNAL MEDICINE

## 2021-03-24 PROCEDURE — 83735 ASSAY OF MAGNESIUM: CPT | Performed by: NURSE PRACTITIONER

## 2021-03-24 PROCEDURE — 25000003 PHARM REV CODE 250: Performed by: NURSE PRACTITIONER

## 2021-03-24 PROCEDURE — 85730 THROMBOPLASTIN TIME PARTIAL: CPT | Performed by: NURSE PRACTITIONER

## 2021-03-24 PROCEDURE — 21400001 HC TELEMETRY ROOM

## 2021-03-24 PROCEDURE — 25000003 PHARM REV CODE 250: Performed by: INTERNAL MEDICINE

## 2021-03-24 PROCEDURE — 96376 TX/PRO/DX INJ SAME DRUG ADON: CPT

## 2021-03-24 PROCEDURE — 85025 COMPLETE CBC W/AUTO DIFF WBC: CPT | Performed by: NURSE PRACTITIONER

## 2021-03-24 PROCEDURE — 96366 THER/PROPH/DIAG IV INF ADDON: CPT

## 2021-03-24 PROCEDURE — 63600175 PHARM REV CODE 636 W HCPCS: Performed by: NURSE PRACTITIONER

## 2021-03-24 PROCEDURE — S4991 NICOTINE PATCH NONLEGEND: HCPCS | Performed by: NURSE PRACTITIONER

## 2021-03-24 PROCEDURE — 99233 SBSQ HOSP IP/OBS HIGH 50: CPT | Mod: ,,, | Performed by: INTERNAL MEDICINE

## 2021-03-24 PROCEDURE — 80048 BASIC METABOLIC PNL TOTAL CA: CPT | Performed by: NURSE PRACTITIONER

## 2021-03-24 RX ORDER — POTASSIUM CHLORIDE 20 MEQ/1
40 TABLET, EXTENDED RELEASE ORAL 2 TIMES DAILY
Status: COMPLETED | OUTPATIENT
Start: 2021-03-24 | End: 2021-03-25

## 2021-03-24 RX ORDER — DILTIAZEM HYDROCHLORIDE 180 MG/1
180 CAPSULE, COATED, EXTENDED RELEASE ORAL DAILY
Status: DISCONTINUED | OUTPATIENT
Start: 2021-03-24 | End: 2021-03-25 | Stop reason: HOSPADM

## 2021-03-24 RX ADMIN — APIXABAN 5 MG: 2.5 TABLET, FILM COATED ORAL at 08:03

## 2021-03-24 RX ADMIN — DILTIAZEM HYDROCHLORIDE 180 MG: 180 CAPSULE, EXTENDED RELEASE ORAL at 12:03

## 2021-03-24 RX ADMIN — HEPARIN SODIUM 9 UNITS/KG/HR: 10000 INJECTION, SOLUTION INTRAVENOUS at 12:03

## 2021-03-24 RX ADMIN — Medication 1 PATCH: at 08:03

## 2021-03-24 RX ADMIN — FUROSEMIDE 60 MG: 10 INJECTION, SOLUTION INTRAMUSCULAR; INTRAVENOUS at 06:03

## 2021-03-24 RX ADMIN — POTASSIUM CHLORIDE 40 MEQ: 1500 TABLET, EXTENDED RELEASE ORAL at 08:03

## 2021-03-24 RX ADMIN — ATORVASTATIN CALCIUM 40 MG: 40 TABLET, FILM COATED ORAL at 08:03

## 2021-03-24 RX ADMIN — LOSARTAN POTASSIUM 25 MG: 25 TABLET, FILM COATED ORAL at 08:03

## 2021-03-24 RX ADMIN — ASPIRIN 81 MG: 81 TABLET, CHEWABLE ORAL at 08:03

## 2021-03-24 RX ADMIN — APIXABAN 5 MG: 2.5 TABLET, FILM COATED ORAL at 12:03

## 2021-03-24 RX ADMIN — POTASSIUM CHLORIDE 40 MEQ: 1500 TABLET, EXTENDED RELEASE ORAL at 09:03

## 2021-03-25 ENCOUNTER — ANESTHESIA EVENT (OUTPATIENT)
Dept: CARDIOLOGY | Facility: HOSPITAL | Age: 61
DRG: 308 | End: 2021-03-25
Payer: MEDICAID

## 2021-03-25 ENCOUNTER — ANESTHESIA (OUTPATIENT)
Dept: CARDIOLOGY | Facility: HOSPITAL | Age: 61
DRG: 308 | End: 2021-03-25
Payer: MEDICAID

## 2021-03-25 VITALS
TEMPERATURE: 97 F | BODY MASS INDEX: 41.77 KG/M2 | SYSTOLIC BLOOD PRESSURE: 104 MMHG | HEART RATE: 88 BPM | HEIGHT: 69 IN | WEIGHT: 282 LBS | RESPIRATION RATE: 18 BRPM | DIASTOLIC BLOOD PRESSURE: 55 MMHG | OXYGEN SATURATION: 93 %

## 2021-03-25 LAB — BSA FOR ECHO PROCEDURE: 2.5 M2

## 2021-03-25 PROCEDURE — 25000003 PHARM REV CODE 250: Performed by: NURSE PRACTITIONER

## 2021-03-25 PROCEDURE — 63600175 PHARM REV CODE 636 W HCPCS: Performed by: NURSE ANESTHETIST, CERTIFIED REGISTERED

## 2021-03-25 PROCEDURE — 92960 CARDIOVERSION ELECTRIC EXT: CPT | Performed by: INTERNAL MEDICINE

## 2021-03-25 PROCEDURE — 25000003 PHARM REV CODE 250: Performed by: INTERNAL MEDICINE

## 2021-03-25 PROCEDURE — 63600175 PHARM REV CODE 636 W HCPCS: Performed by: NURSE PRACTITIONER

## 2021-03-25 PROCEDURE — 92960 PR CARDIOVERSION, ELECTIVE;EXTERN: ICD-10-PCS | Mod: ,,, | Performed by: INTERNAL MEDICINE

## 2021-03-25 PROCEDURE — S4991 NICOTINE PATCH NONLEGEND: HCPCS | Performed by: NURSE PRACTITIONER

## 2021-03-25 PROCEDURE — 63600175 PHARM REV CODE 636 W HCPCS

## 2021-03-25 PROCEDURE — 93010 ELECTROCARDIOGRAM REPORT: CPT | Mod: 59,76,, | Performed by: INTERNAL MEDICINE

## 2021-03-25 PROCEDURE — 92960 CARDIOVERSION ELECTRIC EXT: CPT | Mod: ,,, | Performed by: INTERNAL MEDICINE

## 2021-03-25 PROCEDURE — 37000009 HC ANESTHESIA EA ADD 15 MINS: Performed by: INTERNAL MEDICINE

## 2021-03-25 PROCEDURE — 99233 SBSQ HOSP IP/OBS HIGH 50: CPT | Mod: 25,,, | Performed by: INTERNAL MEDICINE

## 2021-03-25 PROCEDURE — 93005 ELECTROCARDIOGRAM TRACING: CPT

## 2021-03-25 PROCEDURE — 25000003 PHARM REV CODE 250: Performed by: NURSE ANESTHETIST, CERTIFIED REGISTERED

## 2021-03-25 PROCEDURE — 99233 PR SUBSEQUENT HOSPITAL CARE,LEVL III: ICD-10-PCS | Mod: 25,,, | Performed by: INTERNAL MEDICINE

## 2021-03-25 PROCEDURE — 37000008 HC ANESTHESIA 1ST 15 MINUTES: Performed by: INTERNAL MEDICINE

## 2021-03-25 PROCEDURE — 93010 EKG 12-LEAD: ICD-10-PCS | Mod: 59,76,, | Performed by: INTERNAL MEDICINE

## 2021-03-25 RX ORDER — LOSARTAN POTASSIUM 25 MG/1
25 TABLET ORAL 2 TIMES DAILY
Qty: 180 TABLET | Refills: 3 | Status: ON HOLD | OUTPATIENT
Start: 2021-03-25 | End: 2021-04-05 | Stop reason: HOSPADM

## 2021-03-25 RX ORDER — PROPOFOL 10 MG/ML
VIAL (ML) INTRAVENOUS
Status: DISCONTINUED | OUTPATIENT
Start: 2021-03-25 | End: 2021-03-25

## 2021-03-25 RX ORDER — LIDOCAINE HYDROCHLORIDE 20 MG/ML
SOLUTION OROPHARYNGEAL
Status: DISCONTINUED | OUTPATIENT
Start: 2021-03-25 | End: 2021-03-25 | Stop reason: HOSPADM

## 2021-03-25 RX ORDER — POTASSIUM CHLORIDE 20 MEQ/1
20 TABLET, EXTENDED RELEASE ORAL DAILY
Qty: 90 TABLET | Refills: 0 | Status: SHIPPED | OUTPATIENT
Start: 2021-03-25 | End: 2021-06-23

## 2021-03-25 RX ORDER — DILTIAZEM HYDROCHLORIDE 180 MG/1
180 CAPSULE, COATED, EXTENDED RELEASE ORAL DAILY
Qty: 90 CAPSULE | Refills: 3 | Status: SHIPPED | OUTPATIENT
Start: 2021-03-26 | End: 2022-03-26

## 2021-03-25 RX ORDER — LIDOCAINE HYDROCHLORIDE 20 MG/ML
INJECTION INTRAVENOUS
Status: DISCONTINUED | OUTPATIENT
Start: 2021-03-25 | End: 2021-03-25

## 2021-03-25 RX ORDER — FUROSEMIDE 40 MG/1
40 TABLET ORAL 2 TIMES DAILY
Qty: 180 TABLET | Refills: 3 | Status: SHIPPED | OUTPATIENT
Start: 2021-03-25 | End: 2022-03-25

## 2021-03-25 RX ORDER — IBUPROFEN 200 MG
1 TABLET ORAL DAILY
Qty: 28 PATCH | Refills: 0 | Status: SHIPPED | OUTPATIENT
Start: 2021-03-26 | End: 2021-04-23

## 2021-03-25 RX ORDER — ATORVASTATIN CALCIUM 40 MG/1
40 TABLET, FILM COATED ORAL DAILY
Qty: 90 TABLET | Refills: 0 | Status: ON HOLD | OUTPATIENT
Start: 2021-03-25 | End: 2021-04-05 | Stop reason: HOSPADM

## 2021-03-25 RX ADMIN — PROPOFOL 60 MG: 10 INJECTION, EMULSION INTRAVENOUS at 10:03

## 2021-03-25 RX ADMIN — PROPOFOL 150 MG: 10 INJECTION, EMULSION INTRAVENOUS at 10:03

## 2021-03-25 RX ADMIN — FUROSEMIDE 60 MG: 10 INJECTION, SOLUTION INTRAMUSCULAR; INTRAVENOUS at 06:03

## 2021-03-25 RX ADMIN — LOSARTAN POTASSIUM 25 MG: 25 TABLET, FILM COATED ORAL at 09:03

## 2021-03-25 RX ADMIN — POTASSIUM CHLORIDE 40 MEQ: 1500 TABLET, EXTENDED RELEASE ORAL at 09:03

## 2021-03-25 RX ADMIN — ATORVASTATIN CALCIUM 40 MG: 40 TABLET, FILM COATED ORAL at 09:03

## 2021-03-25 RX ADMIN — DILTIAZEM HYDROCHLORIDE 180 MG: 180 CAPSULE, EXTENDED RELEASE ORAL at 09:03

## 2021-03-25 RX ADMIN — Medication 100 MG: at 10:03

## 2021-03-25 RX ADMIN — ASPIRIN 81 MG: 81 TABLET, CHEWABLE ORAL at 09:03

## 2021-03-25 RX ADMIN — Medication 1 PATCH: at 09:03

## 2021-03-25 RX ADMIN — APIXABAN 5 MG: 2.5 TABLET, FILM COATED ORAL at 09:03

## 2021-03-26 ENCOUNTER — PATIENT OUTREACH (OUTPATIENT)
Dept: ADMINISTRATIVE | Facility: CLINIC | Age: 61
End: 2021-03-26

## 2021-03-26 LAB
HCV RNA SERPL NAA+PROBE-LOG IU: 5.45 LOG (10) IU/ML
HCV RNA SERPL QL NAA+PROBE: DETECTED IU/ML
HCV RNA SPEC NAA+PROBE-ACNC: ABNORMAL IU/ML

## 2021-04-01 ENCOUNTER — HOSPITAL ENCOUNTER (INPATIENT)
Facility: HOSPITAL | Age: 61
LOS: 4 days | Discharge: HOME | DRG: 377 | End: 2021-04-05
Attending: EMERGENCY MEDICINE | Admitting: HOSPITALIST
Payer: MEDICAID

## 2021-04-01 ENCOUNTER — DOCUMENTATION ONLY (OUTPATIENT)
Dept: GASTROENTEROLOGY | Facility: HOSPITAL | Age: 61
End: 2021-04-01

## 2021-04-01 DIAGNOSIS — K92.2 GASTROINTESTINAL HEMORRHAGE, UNSPECIFIED GASTROINTESTINAL HEMORRHAGE TYPE: Primary | ICD-10-CM

## 2021-04-01 DIAGNOSIS — I50.33 ACUTE ON CHRONIC DIASTOLIC HEART FAILURE: ICD-10-CM

## 2021-04-01 DIAGNOSIS — R06.02 SHORTNESS OF BREATH: ICD-10-CM

## 2021-04-01 DIAGNOSIS — K92.2 GASTROINTESTINAL HEMORRHAGE: ICD-10-CM

## 2021-04-01 PROBLEM — K70.30 ALCOHOLIC CIRRHOSIS: Status: ACTIVE | Noted: 2021-04-01

## 2021-04-01 PROBLEM — Z86.79 HISTORY OF ATRIAL FIBRILLATION: Status: ACTIVE | Noted: 2021-04-01

## 2021-04-01 LAB
ABO + RH BLD: NORMAL
ALBUMIN SERPL BCP-MCNC: 1.9 G/DL (ref 3.5–5.2)
ALP SERPL-CCNC: 85 U/L (ref 55–135)
ALT SERPL W/O P-5'-P-CCNC: 65 U/L (ref 10–44)
AMPHET+METHAMPHET UR QL: NEGATIVE
ANION GAP SERPL CALC-SCNC: 7 MMOL/L (ref 8–16)
ANISOCYTOSIS BLD QL SMEAR: SLIGHT
AST SERPL-CCNC: 140 U/L (ref 10–40)
BARBITURATES UR QL SCN>200 NG/ML: NEGATIVE
BASOPHILS # BLD AUTO: 0.08 K/UL (ref 0–0.2)
BASOPHILS NFR BLD: 0.6 % (ref 0–1.9)
BENZODIAZ UR QL SCN>200 NG/ML: NEGATIVE
BILIRUB SERPL-MCNC: 2 MG/DL (ref 0.1–1)
BILIRUB UR QL STRIP: NEGATIVE
BLD GP AB SCN CELLS X3 SERPL QL: NORMAL
BNP SERPL-MCNC: 197 PG/ML (ref 0–99)
BUN SERPL-MCNC: 71 MG/DL (ref 8–23)
BZE UR QL SCN: NEGATIVE
CALCIUM SERPL-MCNC: 8.3 MG/DL (ref 8.7–10.5)
CANNABINOIDS UR QL SCN: NEGATIVE
CHLORIDE SERPL-SCNC: 96 MMOL/L (ref 95–110)
CLARITY UR: CLEAR
CO2 SERPL-SCNC: 29 MMOL/L (ref 23–29)
COLOR UR: YELLOW
CREAT SERPL-MCNC: 1.1 MG/DL (ref 0.5–1.4)
CREAT UR-MCNC: 66.2 MG/DL (ref 23–375)
CTP QC/QA: YES
DIFFERENTIAL METHOD: ABNORMAL
EOSINOPHIL # BLD AUTO: 0.1 K/UL (ref 0–0.5)
EOSINOPHIL NFR BLD: 0.6 % (ref 0–8)
ERYTHROCYTE [DISTWIDTH] IN BLOOD BY AUTOMATED COUNT: 15.7 % (ref 11.5–14.5)
EST. GFR  (AFRICAN AMERICAN): >60 ML/MIN/1.73 M^2
EST. GFR  (NON AFRICAN AMERICAN): >60 ML/MIN/1.73 M^2
GLUCOSE SERPL-MCNC: 120 MG/DL (ref 70–110)
GLUCOSE UR QL STRIP: NEGATIVE
HCT VFR BLD AUTO: 14 % (ref 40–54)
HGB BLD-MCNC: 4.7 G/DL (ref 14–18)
HGB UR QL STRIP: NEGATIVE
HYPOCHROMIA BLD QL SMEAR: ABNORMAL
IMM GRANULOCYTES # BLD AUTO: 0.18 K/UL (ref 0–0.04)
IMM GRANULOCYTES NFR BLD AUTO: 1.4 % (ref 0–0.5)
INR PPP: 2.8 (ref 0.8–1.2)
KETONES UR QL STRIP: NEGATIVE
LEUKOCYTE ESTERASE UR QL STRIP: NEGATIVE
LYMPHOCYTES # BLD AUTO: 2.7 K/UL (ref 1–4.8)
LYMPHOCYTES NFR BLD: 21.3 % (ref 18–48)
MCH RBC QN AUTO: 37.3 PG (ref 27–31)
MCHC RBC AUTO-ENTMCNC: 33.6 G/DL (ref 32–36)
MCV RBC AUTO: 111 FL (ref 82–98)
METHADONE UR QL SCN>300 NG/ML: NEGATIVE
MONOCYTES # BLD AUTO: 1.6 K/UL (ref 0.3–1)
MONOCYTES NFR BLD: 12.6 % (ref 4–15)
NEUTROPHILS # BLD AUTO: 8.1 K/UL (ref 1.8–7.7)
NEUTROPHILS NFR BLD: 63.5 % (ref 38–73)
NITRITE UR QL STRIP: NEGATIVE
NRBC BLD-RTO: 0 /100 WBC
OB PNL STL: POSITIVE
OPIATES UR QL SCN: NEGATIVE
PCP UR QL SCN>25 NG/ML: NEGATIVE
PH UR STRIP: 6 [PH] (ref 5–8)
PLATELET # BLD AUTO: 99 K/UL (ref 150–450)
PLATELET BLD QL SMEAR: ABNORMAL
PMV BLD AUTO: 11.5 FL (ref 9.2–12.9)
POLYCHROMASIA BLD QL SMEAR: ABNORMAL
POTASSIUM SERPL-SCNC: 4.9 MMOL/L (ref 3.5–5.1)
PROT SERPL-MCNC: 5.8 G/DL (ref 6–8.4)
PROT UR QL STRIP: NEGATIVE
PROTHROMBIN TIME: 28.8 SEC (ref 9–12.5)
RBC # BLD AUTO: 1.26 M/UL (ref 4.6–6.2)
SARS-COV-2 RDRP RESP QL NAA+PROBE: NEGATIVE
SODIUM SERPL-SCNC: 132 MMOL/L (ref 136–145)
SP GR UR STRIP: 1.02 (ref 1–1.03)
TARGETS BLD QL SMEAR: ABNORMAL
TOXICOLOGY INFORMATION: NORMAL
TROPONIN I SERPL DL<=0.01 NG/ML-MCNC: 0.03 NG/ML (ref 0–0.03)
URN SPEC COLLECT METH UR: NORMAL
UROBILINOGEN UR STRIP-ACNC: 1 EU/DL
WBC # BLD AUTO: 12.71 K/UL (ref 3.9–12.7)

## 2021-04-01 PROCEDURE — C9113 INJ PANTOPRAZOLE SODIUM, VIA: HCPCS | Performed by: EMERGENCY MEDICINE

## 2021-04-01 PROCEDURE — 11000001 HC ACUTE MED/SURG PRIVATE ROOM

## 2021-04-01 PROCEDURE — 63600175 PHARM REV CODE 636 W HCPCS: Performed by: INTERNAL MEDICINE

## 2021-04-01 PROCEDURE — C9113 INJ PANTOPRAZOLE SODIUM, VIA: HCPCS | Performed by: HOSPITALIST

## 2021-04-01 PROCEDURE — 96365 THER/PROPH/DIAG IV INF INIT: CPT

## 2021-04-01 PROCEDURE — 80307 DRUG TEST PRSMV CHEM ANLYZR: CPT | Performed by: INTERNAL MEDICINE

## 2021-04-01 PROCEDURE — 96374 THER/PROPH/DIAG INJ IV PUSH: CPT | Mod: 59

## 2021-04-01 PROCEDURE — 84484 ASSAY OF TROPONIN QUANT: CPT | Performed by: EMERGENCY MEDICINE

## 2021-04-01 PROCEDURE — 83880 ASSAY OF NATRIURETIC PEPTIDE: CPT | Performed by: EMERGENCY MEDICINE

## 2021-04-01 PROCEDURE — 82272 OCCULT BLD FECES 1-3 TESTS: CPT | Performed by: EMERGENCY MEDICINE

## 2021-04-01 PROCEDURE — 86900 BLOOD TYPING SEROLOGIC ABO: CPT | Performed by: EMERGENCY MEDICINE

## 2021-04-01 PROCEDURE — 96376 TX/PRO/DX INJ SAME DRUG ADON: CPT

## 2021-04-01 PROCEDURE — U0002 COVID-19 LAB TEST NON-CDC: HCPCS | Performed by: EMERGENCY MEDICINE

## 2021-04-01 PROCEDURE — 93005 ELECTROCARDIOGRAM TRACING: CPT

## 2021-04-01 PROCEDURE — 93010 ELECTROCARDIOGRAM REPORT: CPT | Mod: ,,, | Performed by: INTERNAL MEDICINE

## 2021-04-01 PROCEDURE — 63600175 PHARM REV CODE 636 W HCPCS: Performed by: EMERGENCY MEDICINE

## 2021-04-01 PROCEDURE — 96366 THER/PROPH/DIAG IV INF ADDON: CPT

## 2021-04-01 PROCEDURE — 86920 COMPATIBILITY TEST SPIN: CPT | Performed by: NURSE PRACTITIONER

## 2021-04-01 PROCEDURE — G0378 HOSPITAL OBSERVATION PER HR: HCPCS

## 2021-04-01 PROCEDURE — 86920 COMPATIBILITY TEST SPIN: CPT | Performed by: EMERGENCY MEDICINE

## 2021-04-01 PROCEDURE — 87902 NFCT AGT GNTYP ALYS HEP C: CPT | Performed by: INTERNAL MEDICINE

## 2021-04-01 PROCEDURE — 80053 COMPREHEN METABOLIC PANEL: CPT | Performed by: EMERGENCY MEDICINE

## 2021-04-01 PROCEDURE — 93010 EKG 12-LEAD: ICD-10-PCS | Mod: ,,, | Performed by: INTERNAL MEDICINE

## 2021-04-01 PROCEDURE — 85610 PROTHROMBIN TIME: CPT | Performed by: EMERGENCY MEDICINE

## 2021-04-01 PROCEDURE — 81003 URINALYSIS AUTO W/O SCOPE: CPT | Mod: 59 | Performed by: EMERGENCY MEDICINE

## 2021-04-01 PROCEDURE — 63600175 PHARM REV CODE 636 W HCPCS: Performed by: HOSPITALIST

## 2021-04-01 PROCEDURE — 96375 TX/PRO/DX INJ NEW DRUG ADDON: CPT

## 2021-04-01 PROCEDURE — 25000003 PHARM REV CODE 250: Performed by: HOSPITALIST

## 2021-04-01 PROCEDURE — 99291 CRITICAL CARE FIRST HOUR: CPT | Mod: 25

## 2021-04-01 PROCEDURE — 86920 COMPATIBILITY TEST SPIN: CPT | Performed by: INTERNAL MEDICINE

## 2021-04-01 PROCEDURE — 85025 COMPLETE CBC W/AUTO DIFF WBC: CPT | Performed by: EMERGENCY MEDICINE

## 2021-04-01 PROCEDURE — 36430 TRANSFUSION BLD/BLD COMPNT: CPT

## 2021-04-01 PROCEDURE — 36415 COLL VENOUS BLD VENIPUNCTURE: CPT | Performed by: EMERGENCY MEDICINE

## 2021-04-01 PROCEDURE — 25000003 PHARM REV CODE 250: Performed by: INTERNAL MEDICINE

## 2021-04-01 RX ORDER — IBUPROFEN 200 MG
1 TABLET ORAL DAILY
Status: DISCONTINUED | OUTPATIENT
Start: 2021-04-02 | End: 2021-04-05 | Stop reason: HOSPADM

## 2021-04-01 RX ORDER — HYDROCODONE BITARTRATE AND ACETAMINOPHEN 500; 5 MG/1; MG/1
TABLET ORAL
Status: DISCONTINUED | OUTPATIENT
Start: 2021-04-01 | End: 2021-04-03

## 2021-04-01 RX ORDER — PANTOPRAZOLE SODIUM 40 MG/10ML
80 INJECTION, POWDER, LYOPHILIZED, FOR SOLUTION INTRAVENOUS
Status: COMPLETED | OUTPATIENT
Start: 2021-04-01 | End: 2021-04-01

## 2021-04-01 RX ORDER — POTASSIUM CHLORIDE 20 MEQ/1
20 TABLET, EXTENDED RELEASE ORAL DAILY
Status: DISCONTINUED | OUTPATIENT
Start: 2021-04-02 | End: 2021-04-05 | Stop reason: HOSPADM

## 2021-04-01 RX ORDER — FUROSEMIDE 10 MG/ML
40 INJECTION INTRAMUSCULAR; INTRAVENOUS 2 TIMES DAILY
Status: DISCONTINUED | OUTPATIENT
Start: 2021-04-01 | End: 2021-04-05 | Stop reason: HOSPADM

## 2021-04-01 RX ORDER — FUROSEMIDE 10 MG/ML
60 INJECTION INTRAMUSCULAR; INTRAVENOUS
Status: COMPLETED | OUTPATIENT
Start: 2021-04-01 | End: 2021-04-01

## 2021-04-01 RX ORDER — SODIUM CHLORIDE 0.9 % (FLUSH) 0.9 %
10 SYRINGE (ML) INJECTION
Status: DISCONTINUED | OUTPATIENT
Start: 2021-04-01 | End: 2021-04-03

## 2021-04-01 RX ORDER — ATORVASTATIN CALCIUM 40 MG/1
40 TABLET, FILM COATED ORAL DAILY
Status: DISCONTINUED | OUTPATIENT
Start: 2021-04-02 | End: 2021-04-05

## 2021-04-01 RX ADMIN — PHYTONADIONE 10 MG: 10 INJECTION, EMULSION INTRAMUSCULAR; INTRAVENOUS; SUBCUTANEOUS at 10:04

## 2021-04-01 RX ADMIN — PANTOPRAZOLE SODIUM 8 MG/HR: 40 INJECTION, POWDER, FOR SOLUTION INTRAVENOUS at 10:04

## 2021-04-01 RX ADMIN — FUROSEMIDE 60 MG: 10 INJECTION, SOLUTION INTRAMUSCULAR; INTRAVENOUS at 08:04

## 2021-04-01 RX ADMIN — PANTOPRAZOLE SODIUM 80 MG: 40 INJECTION, POWDER, FOR SOLUTION INTRAVENOUS at 09:04

## 2021-04-01 RX ADMIN — FUROSEMIDE 40 MG: 10 INJECTION, SOLUTION INTRAMUSCULAR; INTRAVENOUS at 10:04

## 2021-04-02 ENCOUNTER — ANESTHESIA EVENT (OUTPATIENT)
Dept: ENDOSCOPY | Facility: HOSPITAL | Age: 61
DRG: 377 | End: 2021-04-02
Payer: MEDICAID

## 2021-04-02 PROBLEM — R19.5 OCCULT BLOOD POSITIVE STOOL: Status: ACTIVE | Noted: 2021-04-02

## 2021-04-02 PROBLEM — F17.200 NICOTINE DEPENDENCE: Status: ACTIVE | Noted: 2021-04-02

## 2021-04-02 PROBLEM — E88.09 HYPOALBUMINEMIA: Status: ACTIVE | Noted: 2021-04-02

## 2021-04-02 PROBLEM — Z79.01 CHRONIC ANTICOAGULATION: Status: ACTIVE | Noted: 2021-04-02

## 2021-04-02 LAB
AFP SERPL-MCNC: 9.4 NG/ML (ref 0–8.4)
ALBUMIN SERPL BCP-MCNC: 1.9 G/DL (ref 3.5–5.2)
ALP SERPL-CCNC: 79 U/L (ref 55–135)
ALT SERPL W/O P-5'-P-CCNC: 73 U/L (ref 10–44)
ANION GAP SERPL CALC-SCNC: 4 MMOL/L (ref 8–16)
AST SERPL-CCNC: 172 U/L (ref 10–40)
BASOPHILS # BLD AUTO: 0.08 K/UL (ref 0–0.2)
BASOPHILS NFR BLD: 0.7 % (ref 0–1.9)
BILIRUB SERPL-MCNC: 2.4 MG/DL (ref 0.1–1)
BLD PROD TYP BPU: NORMAL
BLOOD UNIT EXPIRATION DATE: NORMAL
BLOOD UNIT TYPE CODE: 9500
BLOOD UNIT TYPE: NORMAL
BUN SERPL-MCNC: 71 MG/DL (ref 8–23)
CALCIUM SERPL-MCNC: 7.9 MG/DL (ref 8.7–10.5)
CHLORIDE SERPL-SCNC: 97 MMOL/L (ref 95–110)
CO2 SERPL-SCNC: 31 MMOL/L (ref 23–29)
CODING SYSTEM: NORMAL
CREAT SERPL-MCNC: 1.1 MG/DL (ref 0.5–1.4)
DIFFERENTIAL METHOD: ABNORMAL
DISPENSE STATUS: NORMAL
EOSINOPHIL # BLD AUTO: 0.1 K/UL (ref 0–0.5)
EOSINOPHIL NFR BLD: 0.9 % (ref 0–8)
ERYTHROCYTE [DISTWIDTH] IN BLOOD BY AUTOMATED COUNT: 18.7 % (ref 11.5–14.5)
EST. GFR  (AFRICAN AMERICAN): >60 ML/MIN/1.73 M^2
EST. GFR  (NON AFRICAN AMERICAN): >60 ML/MIN/1.73 M^2
GLUCOSE SERPL-MCNC: 119 MG/DL (ref 70–110)
HCT VFR BLD AUTO: 18.2 % (ref 40–54)
HCT VFR BLD AUTO: 22.3 % (ref 40–54)
HGB BLD-MCNC: 6.2 G/DL (ref 14–18)
HGB BLD-MCNC: 7.6 G/DL (ref 14–18)
IMM GRANULOCYTES # BLD AUTO: 0.11 K/UL (ref 0–0.04)
IMM GRANULOCYTES NFR BLD AUTO: 1 % (ref 0–0.5)
INR PPP: 2.2 (ref 0.8–1.2)
LYMPHOCYTES # BLD AUTO: 2 K/UL (ref 1–4.8)
LYMPHOCYTES NFR BLD: 17.3 % (ref 18–48)
MCH RBC QN AUTO: 34.8 PG (ref 27–31)
MCHC RBC AUTO-ENTMCNC: 34.1 G/DL (ref 32–36)
MCV RBC AUTO: 102 FL (ref 82–98)
MONOCYTES # BLD AUTO: 1.7 K/UL (ref 0.3–1)
MONOCYTES NFR BLD: 14.6 % (ref 4–15)
NEUTROPHILS # BLD AUTO: 7.4 K/UL (ref 1.8–7.7)
NEUTROPHILS NFR BLD: 65.5 % (ref 38–73)
NRBC BLD-RTO: 0 /100 WBC
NUM UNITS TRANS PACKED RBC: NORMAL
PLATELET # BLD AUTO: 99 K/UL (ref 150–450)
PMV BLD AUTO: 11.2 FL (ref 9.2–12.9)
POTASSIUM SERPL-SCNC: 4.8 MMOL/L (ref 3.5–5.1)
PROT SERPL-MCNC: 5.8 G/DL (ref 6–8.4)
PROTHROMBIN TIME: 23.1 SEC (ref 9–12.5)
RBC # BLD AUTO: 1.78 M/UL (ref 4.6–6.2)
SODIUM SERPL-SCNC: 132 MMOL/L (ref 136–145)
WBC # BLD AUTO: 11.34 K/UL (ref 3.9–12.7)

## 2021-04-02 PROCEDURE — 63600175 PHARM REV CODE 636 W HCPCS: Performed by: HOSPITALIST

## 2021-04-02 PROCEDURE — 21400001 HC TELEMETRY ROOM

## 2021-04-02 PROCEDURE — S4991 NICOTINE PATCH NONLEGEND: HCPCS | Performed by: HOSPITALIST

## 2021-04-02 PROCEDURE — S4991 NICOTINE PATCH NONLEGEND: HCPCS | Performed by: INTERNAL MEDICINE

## 2021-04-02 PROCEDURE — 11000001 HC ACUTE MED/SURG PRIVATE ROOM

## 2021-04-02 PROCEDURE — C9113 INJ PANTOPRAZOLE SODIUM, VIA: HCPCS | Performed by: HOSPITALIST

## 2021-04-02 PROCEDURE — 63600175 PHARM REV CODE 636 W HCPCS: Performed by: INTERNAL MEDICINE

## 2021-04-02 PROCEDURE — 96366 THER/PROPH/DIAG IV INF ADDON: CPT

## 2021-04-02 PROCEDURE — 99223 PR INITIAL HOSPITAL CARE,LEVL III: ICD-10-PCS | Mod: ,,, | Performed by: INTERNAL MEDICINE

## 2021-04-02 PROCEDURE — 99233 PR SUBSEQUENT HOSPITAL CARE,LEVL III: ICD-10-PCS | Mod: ,,, | Performed by: INTERNAL MEDICINE

## 2021-04-02 PROCEDURE — 36430 TRANSFUSION BLD/BLD COMPNT: CPT

## 2021-04-02 PROCEDURE — 96367 TX/PROPH/DG ADDL SEQ IV INF: CPT

## 2021-04-02 PROCEDURE — 80053 COMPREHEN METABOLIC PANEL: CPT | Performed by: INTERNAL MEDICINE

## 2021-04-02 PROCEDURE — 25000003 PHARM REV CODE 250: Performed by: HOSPITALIST

## 2021-04-02 PROCEDURE — 85025 COMPLETE CBC W/AUTO DIFF WBC: CPT | Performed by: INTERNAL MEDICINE

## 2021-04-02 PROCEDURE — 99223 1ST HOSP IP/OBS HIGH 75: CPT | Mod: ,,, | Performed by: INTERNAL MEDICINE

## 2021-04-02 PROCEDURE — C1751 CATH, INF, PER/CENT/MIDLINE: HCPCS

## 2021-04-02 PROCEDURE — 94761 N-INVAS EAR/PLS OXIMETRY MLT: CPT

## 2021-04-02 PROCEDURE — 96376 TX/PRO/DX INJ SAME DRUG ADON: CPT

## 2021-04-02 PROCEDURE — 85018 HEMOGLOBIN: CPT | Performed by: INTERNAL MEDICINE

## 2021-04-02 PROCEDURE — 99233 SBSQ HOSP IP/OBS HIGH 50: CPT | Mod: ,,, | Performed by: INTERNAL MEDICINE

## 2021-04-02 PROCEDURE — 25000003 PHARM REV CODE 250: Performed by: INTERNAL MEDICINE

## 2021-04-02 PROCEDURE — 82105 ALPHA-FETOPROTEIN SERUM: CPT | Performed by: INTERNAL MEDICINE

## 2021-04-02 PROCEDURE — P9016 RBC LEUKOCYTES REDUCED: HCPCS | Performed by: INTERNAL MEDICINE

## 2021-04-02 PROCEDURE — 85610 PROTHROMBIN TIME: CPT | Performed by: INTERNAL MEDICINE

## 2021-04-02 PROCEDURE — P9016 RBC LEUKOCYTES REDUCED: HCPCS | Performed by: EMERGENCY MEDICINE

## 2021-04-02 PROCEDURE — 85014 HEMATOCRIT: CPT | Performed by: INTERNAL MEDICINE

## 2021-04-02 PROCEDURE — 36573 INSJ PICC RS&I 5 YR+: CPT

## 2021-04-02 RX ORDER — CHLORDIAZEPOXIDE HYDROCHLORIDE 10 MG/1
10 CAPSULE, GELATIN COATED ORAL 4 TIMES DAILY PRN
Status: DISCONTINUED | OUTPATIENT
Start: 2021-04-02 | End: 2021-04-05 | Stop reason: HOSPADM

## 2021-04-02 RX ADMIN — OCTREOTIDE ACETATE 50 MCG/HR: 500 INJECTION, SOLUTION INTRAVENOUS; SUBCUTANEOUS at 11:04

## 2021-04-02 RX ADMIN — PHYTONADIONE 10 MG: 10 INJECTION, EMULSION INTRAMUSCULAR; INTRAVENOUS; SUBCUTANEOUS at 09:04

## 2021-04-02 RX ADMIN — PANTOPRAZOLE SODIUM 8 MG/HR: 40 INJECTION, POWDER, FOR SOLUTION INTRAVENOUS at 11:04

## 2021-04-02 RX ADMIN — OCTREOTIDE ACETATE 50 MCG/HR: 500 INJECTION, SOLUTION INTRAVENOUS; SUBCUTANEOUS at 01:04

## 2021-04-02 RX ADMIN — ATORVASTATIN CALCIUM 40 MG: 40 TABLET, FILM COATED ORAL at 09:04

## 2021-04-02 RX ADMIN — PANTOPRAZOLE SODIUM 8 MG/HR: 40 INJECTION, POWDER, FOR SOLUTION INTRAVENOUS at 05:04

## 2021-04-02 RX ADMIN — PANTOPRAZOLE SODIUM 8 MG/HR: 40 INJECTION, POWDER, FOR SOLUTION INTRAVENOUS at 03:04

## 2021-04-02 RX ADMIN — FUROSEMIDE 40 MG: 10 INJECTION, SOLUTION INTRAMUSCULAR; INTRAVENOUS at 05:04

## 2021-04-02 RX ADMIN — PANTOPRAZOLE SODIUM 8 MG/HR: 40 INJECTION, POWDER, FOR SOLUTION INTRAVENOUS at 08:04

## 2021-04-02 RX ADMIN — POTASSIUM CHLORIDE 20 MEQ: 1500 TABLET, EXTENDED RELEASE ORAL at 09:04

## 2021-04-02 RX ADMIN — FUROSEMIDE 40 MG: 10 INJECTION, SOLUTION INTRAMUSCULAR; INTRAVENOUS at 09:04

## 2021-04-02 RX ADMIN — Medication 1 PATCH: at 09:04

## 2021-04-03 ENCOUNTER — ANESTHESIA (OUTPATIENT)
Dept: ENDOSCOPY | Facility: HOSPITAL | Age: 61
DRG: 377 | End: 2021-04-03
Payer: MEDICAID

## 2021-04-03 PROBLEM — K31.82 DIEULAFOY LESION OF STOMACH: Status: ACTIVE | Noted: 2021-04-03

## 2021-04-03 LAB
ALBUMIN SERPL BCP-MCNC: 1.9 G/DL (ref 3.5–5.2)
ALP SERPL-CCNC: 74 U/L (ref 55–135)
ALT SERPL W/O P-5'-P-CCNC: 102 U/L (ref 10–44)
ANION GAP SERPL CALC-SCNC: 3 MMOL/L (ref 8–16)
AST SERPL-CCNC: 225 U/L (ref 10–40)
BASOPHILS # BLD AUTO: 0.12 K/UL (ref 0–0.2)
BASOPHILS NFR BLD: 1.1 % (ref 0–1.9)
BILIRUB SERPL-MCNC: 3.6 MG/DL (ref 0.1–1)
BLD PROD TYP BPU: NORMAL
BLOOD UNIT EXPIRATION DATE: NORMAL
BLOOD UNIT TYPE CODE: 9500
BLOOD UNIT TYPE: NORMAL
BUN SERPL-MCNC: 61 MG/DL (ref 8–23)
CALCIUM SERPL-MCNC: 7.9 MG/DL (ref 8.7–10.5)
CHLORIDE SERPL-SCNC: 96 MMOL/L (ref 95–110)
CO2 SERPL-SCNC: 32 MMOL/L (ref 23–29)
CODING SYSTEM: NORMAL
CREAT SERPL-MCNC: 1.1 MG/DL (ref 0.5–1.4)
DIFFERENTIAL METHOD: ABNORMAL
DISPENSE STATUS: NORMAL
EOSINOPHIL # BLD AUTO: 0.2 K/UL (ref 0–0.5)
EOSINOPHIL NFR BLD: 1.7 % (ref 0–8)
ERYTHROCYTE [DISTWIDTH] IN BLOOD BY AUTOMATED COUNT: 19.5 % (ref 11.5–14.5)
EST. GFR  (AFRICAN AMERICAN): >60 ML/MIN/1.73 M^2
EST. GFR  (NON AFRICAN AMERICAN): >60 ML/MIN/1.73 M^2
GLUCOSE SERPL-MCNC: 114 MG/DL (ref 70–110)
HCT VFR BLD AUTO: 22.8 % (ref 40–54)
HGB BLD-MCNC: 7.9 G/DL (ref 14–18)
IMM GRANULOCYTES # BLD AUTO: 0.22 K/UL (ref 0–0.04)
IMM GRANULOCYTES NFR BLD AUTO: 2 % (ref 0–0.5)
INR PPP: 1.8 (ref 0.8–1.2)
LYMPHOCYTES # BLD AUTO: 1.6 K/UL (ref 1–4.8)
LYMPHOCYTES NFR BLD: 14.6 % (ref 18–48)
MCH RBC QN AUTO: 33.6 PG (ref 27–31)
MCHC RBC AUTO-ENTMCNC: 34.6 G/DL (ref 32–36)
MCV RBC AUTO: 97 FL (ref 82–98)
MONOCYTES # BLD AUTO: 1.4 K/UL (ref 0.3–1)
MONOCYTES NFR BLD: 12.7 % (ref 4–15)
NEUTROPHILS # BLD AUTO: 7.4 K/UL (ref 1.8–7.7)
NEUTROPHILS NFR BLD: 67.9 % (ref 38–73)
NRBC BLD-RTO: 0 /100 WBC
NUM UNITS TRANS PACKED RBC: NORMAL
PLATELET # BLD AUTO: 89 K/UL (ref 150–450)
PMV BLD AUTO: 10.8 FL (ref 9.2–12.9)
POTASSIUM SERPL-SCNC: 4.7 MMOL/L (ref 3.5–5.1)
PROT SERPL-MCNC: 5.7 G/DL (ref 6–8.4)
PROTHROMBIN TIME: 19.8 SEC (ref 9–12.5)
RBC # BLD AUTO: 2.35 M/UL (ref 4.6–6.2)
SODIUM SERPL-SCNC: 131 MMOL/L (ref 136–145)
WBC # BLD AUTO: 10.85 K/UL (ref 3.9–12.7)

## 2021-04-03 PROCEDURE — 27201028 HC NEEDLE, SCLERO: Performed by: INTERNAL MEDICINE

## 2021-04-03 PROCEDURE — S4991 NICOTINE PATCH NONLEGEND: HCPCS | Performed by: INTERNAL MEDICINE

## 2021-04-03 PROCEDURE — 36430 TRANSFUSION BLD/BLD COMPNT: CPT

## 2021-04-03 PROCEDURE — 27200997: Performed by: INTERNAL MEDICINE

## 2021-04-03 PROCEDURE — 21400001 HC TELEMETRY ROOM

## 2021-04-03 PROCEDURE — 63600175 PHARM REV CODE 636 W HCPCS: Performed by: INTERNAL MEDICINE

## 2021-04-03 PROCEDURE — 80053 COMPREHEN METABOLIC PANEL: CPT | Performed by: INTERNAL MEDICINE

## 2021-04-03 PROCEDURE — C9113 INJ PANTOPRAZOLE SODIUM, VIA: HCPCS | Performed by: HOSPITALIST

## 2021-04-03 PROCEDURE — 99233 SBSQ HOSP IP/OBS HIGH 50: CPT | Mod: ,,, | Performed by: INTERNAL MEDICINE

## 2021-04-03 PROCEDURE — 25000003 PHARM REV CODE 250: Performed by: NURSE ANESTHETIST, CERTIFIED REGISTERED

## 2021-04-03 PROCEDURE — 25500020 PHARM REV CODE 255: Performed by: INTERNAL MEDICINE

## 2021-04-03 PROCEDURE — P9016 RBC LEUKOCYTES REDUCED: HCPCS | Performed by: NURSE PRACTITIONER

## 2021-04-03 PROCEDURE — 37000009 HC ANESTHESIA EA ADD 15 MINS: Performed by: INTERNAL MEDICINE

## 2021-04-03 PROCEDURE — 85610 PROTHROMBIN TIME: CPT | Performed by: INTERNAL MEDICINE

## 2021-04-03 PROCEDURE — 37000008 HC ANESTHESIA 1ST 15 MINUTES: Performed by: INTERNAL MEDICINE

## 2021-04-03 PROCEDURE — 25000003 PHARM REV CODE 250: Performed by: HOSPITALIST

## 2021-04-03 PROCEDURE — 63600175 PHARM REV CODE 636 W HCPCS: Performed by: HOSPITALIST

## 2021-04-03 PROCEDURE — 63600175 PHARM REV CODE 636 W HCPCS: Performed by: NURSE ANESTHETIST, CERTIFIED REGISTERED

## 2021-04-03 PROCEDURE — 85025 COMPLETE CBC W/AUTO DIFF WBC: CPT | Performed by: INTERNAL MEDICINE

## 2021-04-03 PROCEDURE — 43255 PR EGD, FLEX, W/CTRL BLEED, ANY METHOD: ICD-10-PCS | Mod: ,,, | Performed by: INTERNAL MEDICINE

## 2021-04-03 PROCEDURE — 11000001 HC ACUTE MED/SURG PRIVATE ROOM

## 2021-04-03 PROCEDURE — 43255 EGD CONTROL BLEEDING ANY: CPT | Performed by: INTERNAL MEDICINE

## 2021-04-03 PROCEDURE — 25000003 PHARM REV CODE 250: Performed by: INTERNAL MEDICINE

## 2021-04-03 PROCEDURE — 43255 EGD CONTROL BLEEDING ANY: CPT | Mod: ,,, | Performed by: INTERNAL MEDICINE

## 2021-04-03 PROCEDURE — 99233 PR SUBSEQUENT HOSPITAL CARE,LEVL III: ICD-10-PCS | Mod: ,,, | Performed by: INTERNAL MEDICINE

## 2021-04-03 RX ORDER — PANTOPRAZOLE SODIUM 40 MG/1
40 TABLET, DELAYED RELEASE ORAL DAILY
Status: DISCONTINUED | OUTPATIENT
Start: 2021-04-04 | End: 2021-04-05 | Stop reason: HOSPADM

## 2021-04-03 RX ORDER — MUPIROCIN 20 MG/G
OINTMENT TOPICAL 2 TIMES DAILY
Status: DISCONTINUED | OUTPATIENT
Start: 2021-04-03 | End: 2021-04-05 | Stop reason: HOSPADM

## 2021-04-03 RX ORDER — HYDROCODONE BITARTRATE AND ACETAMINOPHEN 500; 5 MG/1; MG/1
TABLET ORAL
Status: DISCONTINUED | OUTPATIENT
Start: 2021-04-03 | End: 2021-04-03

## 2021-04-03 RX ORDER — SPIRONOLACTONE 100 MG/1
100 TABLET, FILM COATED ORAL DAILY
Status: DISCONTINUED | OUTPATIENT
Start: 2021-04-04 | End: 2021-04-05 | Stop reason: HOSPADM

## 2021-04-03 RX ORDER — PROPOFOL 10 MG/ML
VIAL (ML) INTRAVENOUS
Status: DISCONTINUED | OUTPATIENT
Start: 2021-04-03 | End: 2021-04-03

## 2021-04-03 RX ORDER — LIDOCAINE HYDROCHLORIDE 10 MG/ML
INJECTION, SOLUTION EPIDURAL; INFILTRATION; INTRACAUDAL; PERINEURAL
Status: DISCONTINUED | OUTPATIENT
Start: 2021-04-03 | End: 2021-04-03

## 2021-04-03 RX ORDER — SODIUM CHLORIDE, SODIUM LACTATE, POTASSIUM CHLORIDE, CALCIUM CHLORIDE 600; 310; 30; 20 MG/100ML; MG/100ML; MG/100ML; MG/100ML
INJECTION, SOLUTION INTRAVENOUS CONTINUOUS PRN
Status: DISCONTINUED | OUTPATIENT
Start: 2021-04-03 | End: 2021-04-03

## 2021-04-03 RX ORDER — EPINEPHRINE 0.1 MG/ML
INJECTION INTRAVENOUS
Status: COMPLETED | OUTPATIENT
Start: 2021-04-03 | End: 2021-04-03

## 2021-04-03 RX ADMIN — PROPOFOL 20 MG: 10 INJECTION, EMULSION INTRAVENOUS at 08:04

## 2021-04-03 RX ADMIN — SODIUM CHLORIDE, SODIUM LACTATE, POTASSIUM CHLORIDE, AND CALCIUM CHLORIDE: 600; 310; 30; 20 INJECTION, SOLUTION INTRAVENOUS at 08:04

## 2021-04-03 RX ADMIN — PROPOFOL 100 MG: 10 INJECTION, EMULSION INTRAVENOUS at 08:04

## 2021-04-03 RX ADMIN — Medication 1 PATCH: at 09:04

## 2021-04-03 RX ADMIN — MUPIROCIN: 20 OINTMENT TOPICAL at 08:04

## 2021-04-03 RX ADMIN — CEFTRIAXONE 1 G: 1 INJECTION, SOLUTION INTRAVENOUS at 09:04

## 2021-04-03 RX ADMIN — ATORVASTATIN CALCIUM 40 MG: 40 TABLET, FILM COATED ORAL at 09:04

## 2021-04-03 RX ADMIN — PHYTONADIONE 10 MG: 10 INJECTION, EMULSION INTRAMUSCULAR; INTRAVENOUS; SUBCUTANEOUS at 09:04

## 2021-04-03 RX ADMIN — FUROSEMIDE 40 MG: 10 INJECTION, SOLUTION INTRAMUSCULAR; INTRAVENOUS at 05:04

## 2021-04-03 RX ADMIN — LIDOCAINE HYDROCHLORIDE 5 ML: 10 INJECTION, SOLUTION EPIDURAL; INFILTRATION; INTRACAUDAL; PERINEURAL at 08:04

## 2021-04-03 RX ADMIN — FUROSEMIDE 40 MG: 10 INJECTION, SOLUTION INTRAMUSCULAR; INTRAVENOUS at 09:04

## 2021-04-03 RX ADMIN — PANTOPRAZOLE SODIUM 8 MG/HR: 40 INJECTION, POWDER, FOR SOLUTION INTRAVENOUS at 03:04

## 2021-04-03 RX ADMIN — EPINEPHRINE 0.1 MG: 0.1 INJECTION, SOLUTION ENDOTRACHEAL; INTRACARDIAC; INTRAVENOUS at 08:04

## 2021-04-03 RX ADMIN — MUPIROCIN: 20 OINTMENT TOPICAL at 09:04

## 2021-04-03 RX ADMIN — POTASSIUM CHLORIDE 20 MEQ: 1500 TABLET, EXTENDED RELEASE ORAL at 09:04

## 2021-04-03 RX ADMIN — IOHEXOL 100 ML: 350 INJECTION, SOLUTION INTRAVENOUS at 12:04

## 2021-04-04 PROBLEM — E83.42 HYPOMAGNESEMIA: Status: ACTIVE | Noted: 2021-04-04

## 2021-04-04 PROBLEM — D68.9 COAGULOPATHY: Status: ACTIVE | Noted: 2021-04-04

## 2021-04-04 LAB
ALBUMIN SERPL BCP-MCNC: 1.9 G/DL (ref 3.5–5.2)
ALP SERPL-CCNC: 78 U/L (ref 55–135)
ALT SERPL W/O P-5'-P-CCNC: 120 U/L (ref 10–44)
ANION GAP SERPL CALC-SCNC: 5 MMOL/L (ref 8–16)
AST SERPL-CCNC: 255 U/L (ref 10–40)
BASOPHILS # BLD AUTO: 0.06 K/UL (ref 0–0.2)
BASOPHILS NFR BLD: 0.7 % (ref 0–1.9)
BILIRUB SERPL-MCNC: 3.4 MG/DL (ref 0.1–1)
BUN SERPL-MCNC: 49 MG/DL (ref 8–23)
CALCIUM SERPL-MCNC: 7.6 MG/DL (ref 8.7–10.5)
CHLORIDE SERPL-SCNC: 95 MMOL/L (ref 95–110)
CO2 SERPL-SCNC: 33 MMOL/L (ref 23–29)
CREAT SERPL-MCNC: 1.1 MG/DL (ref 0.5–1.4)
DIFFERENTIAL METHOD: ABNORMAL
EOSINOPHIL # BLD AUTO: 0.1 K/UL (ref 0–0.5)
EOSINOPHIL NFR BLD: 1.1 % (ref 0–8)
ERYTHROCYTE [DISTWIDTH] IN BLOOD BY AUTOMATED COUNT: 18.7 % (ref 11.5–14.5)
EST. GFR  (AFRICAN AMERICAN): >60 ML/MIN/1.73 M^2
EST. GFR  (NON AFRICAN AMERICAN): >60 ML/MIN/1.73 M^2
GLUCOSE SERPL-MCNC: 104 MG/DL (ref 70–110)
HCT VFR BLD AUTO: 24.7 % (ref 40–54)
HGB BLD-MCNC: 8.4 G/DL (ref 14–18)
IMM GRANULOCYTES # BLD AUTO: 0.1 K/UL (ref 0–0.04)
IMM GRANULOCYTES NFR BLD AUTO: 1.1 % (ref 0–0.5)
INR PPP: 1.7 (ref 0.8–1.2)
LYMPHOCYTES # BLD AUTO: 0.6 K/UL (ref 1–4.8)
LYMPHOCYTES NFR BLD: 6.7 % (ref 18–48)
MAGNESIUM SERPL-MCNC: 1.7 MG/DL (ref 1.6–2.6)
MCH RBC QN AUTO: 32.8 PG (ref 27–31)
MCHC RBC AUTO-ENTMCNC: 34 G/DL (ref 32–36)
MCV RBC AUTO: 97 FL (ref 82–98)
MONOCYTES # BLD AUTO: 1.1 K/UL (ref 0.3–1)
MONOCYTES NFR BLD: 12.1 % (ref 4–15)
NEUTROPHILS # BLD AUTO: 6.9 K/UL (ref 1.8–7.7)
NEUTROPHILS NFR BLD: 78.3 % (ref 38–73)
NRBC BLD-RTO: 0 /100 WBC
PLATELET # BLD AUTO: 74 K/UL (ref 150–450)
PMV BLD AUTO: 10.7 FL (ref 9.2–12.9)
POTASSIUM SERPL-SCNC: 4.4 MMOL/L (ref 3.5–5.1)
PROT SERPL-MCNC: 5.8 G/DL (ref 6–8.4)
PROTHROMBIN TIME: 18 SEC (ref 9–12.5)
RBC # BLD AUTO: 2.56 M/UL (ref 4.6–6.2)
SODIUM SERPL-SCNC: 133 MMOL/L (ref 136–145)
WBC # BLD AUTO: 8.85 K/UL (ref 3.9–12.7)

## 2021-04-04 PROCEDURE — 99233 PR SUBSEQUENT HOSPITAL CARE,LEVL III: ICD-10-PCS | Mod: ,,, | Performed by: INTERNAL MEDICINE

## 2021-04-04 PROCEDURE — 99233 SBSQ HOSP IP/OBS HIGH 50: CPT | Mod: ,,, | Performed by: INTERNAL MEDICINE

## 2021-04-04 PROCEDURE — S4991 NICOTINE PATCH NONLEGEND: HCPCS | Performed by: INTERNAL MEDICINE

## 2021-04-04 PROCEDURE — 63600175 PHARM REV CODE 636 W HCPCS: Performed by: INTERNAL MEDICINE

## 2021-04-04 PROCEDURE — 11000001 HC ACUTE MED/SURG PRIVATE ROOM

## 2021-04-04 PROCEDURE — 85025 COMPLETE CBC W/AUTO DIFF WBC: CPT | Performed by: INTERNAL MEDICINE

## 2021-04-04 PROCEDURE — 25000003 PHARM REV CODE 250: Performed by: INTERNAL MEDICINE

## 2021-04-04 PROCEDURE — 63600175 PHARM REV CODE 636 W HCPCS: Performed by: NURSE PRACTITIONER

## 2021-04-04 PROCEDURE — 83735 ASSAY OF MAGNESIUM: CPT | Performed by: NURSE PRACTITIONER

## 2021-04-04 PROCEDURE — 80053 COMPREHEN METABOLIC PANEL: CPT | Performed by: INTERNAL MEDICINE

## 2021-04-04 PROCEDURE — 25000003 PHARM REV CODE 250: Performed by: NURSE PRACTITIONER

## 2021-04-04 PROCEDURE — 85610 PROTHROMBIN TIME: CPT | Performed by: INTERNAL MEDICINE

## 2021-04-04 PROCEDURE — 21400001 HC TELEMETRY ROOM

## 2021-04-04 RX ORDER — MAGNESIUM SULFATE HEPTAHYDRATE 40 MG/ML
2 INJECTION, SOLUTION INTRAVENOUS ONCE
Status: COMPLETED | OUTPATIENT
Start: 2021-04-04 | End: 2021-04-04

## 2021-04-04 RX ORDER — LANOLIN ALCOHOL/MO/W.PET/CERES
400 CREAM (GRAM) TOPICAL 2 TIMES DAILY
Status: DISCONTINUED | OUTPATIENT
Start: 2021-04-04 | End: 2021-04-05 | Stop reason: HOSPADM

## 2021-04-04 RX ORDER — DOCUSATE SODIUM 100 MG/1
100 CAPSULE, LIQUID FILLED ORAL DAILY
Status: DISCONTINUED | OUTPATIENT
Start: 2021-04-04 | End: 2021-04-05 | Stop reason: HOSPADM

## 2021-04-04 RX ORDER — MAGNESIUM SULFATE 1 G/100ML
1 INJECTION INTRAVENOUS ONCE
Status: COMPLETED | OUTPATIENT
Start: 2021-04-04 | End: 2021-04-04

## 2021-04-04 RX ADMIN — CEFTRIAXONE 1 G: 1 INJECTION, SOLUTION INTRAVENOUS at 08:04

## 2021-04-04 RX ADMIN — SPIRONOLACTONE 100 MG: 100 TABLET ORAL at 08:04

## 2021-04-04 RX ADMIN — MUPIROCIN: 20 OINTMENT TOPICAL at 08:04

## 2021-04-04 RX ADMIN — ATORVASTATIN CALCIUM 40 MG: 40 TABLET, FILM COATED ORAL at 08:04

## 2021-04-04 RX ADMIN — Medication 400 MG: at 08:04

## 2021-04-04 RX ADMIN — PANTOPRAZOLE SODIUM 40 MG: 40 TABLET, DELAYED RELEASE ORAL at 08:04

## 2021-04-04 RX ADMIN — DOCUSATE SODIUM 100 MG: 100 CAPSULE, LIQUID FILLED ORAL at 01:04

## 2021-04-04 RX ADMIN — MAGNESIUM SULFATE 1 G: 1 INJECTION INTRAVENOUS at 09:04

## 2021-04-04 RX ADMIN — FUROSEMIDE 40 MG: 10 INJECTION, SOLUTION INTRAMUSCULAR; INTRAVENOUS at 08:04

## 2021-04-04 RX ADMIN — Medication 1 PATCH: at 08:04

## 2021-04-04 RX ADMIN — FUROSEMIDE 40 MG: 10 INJECTION, SOLUTION INTRAMUSCULAR; INTRAVENOUS at 05:04

## 2021-04-04 RX ADMIN — MAGNESIUM SULFATE 2 G: 2 INJECTION INTRAVENOUS at 10:04

## 2021-04-04 RX ADMIN — POTASSIUM CHLORIDE 20 MEQ: 1500 TABLET, EXTENDED RELEASE ORAL at 08:04

## 2021-04-05 VITALS
WEIGHT: 273.13 LBS | SYSTOLIC BLOOD PRESSURE: 133 MMHG | OXYGEN SATURATION: 99 % | DIASTOLIC BLOOD PRESSURE: 64 MMHG | BODY MASS INDEX: 40.45 KG/M2 | TEMPERATURE: 98 F | HEART RATE: 68 BPM | HEIGHT: 69 IN | RESPIRATION RATE: 18 BRPM

## 2021-04-05 PROBLEM — K92.2 GASTROINTESTINAL HEMORRHAGE: Status: RESOLVED | Noted: 2021-04-01 | Resolved: 2021-04-05

## 2021-04-05 PROBLEM — I50.33 ACUTE ON CHRONIC DIASTOLIC HEART FAILURE: Status: RESOLVED | Noted: 2021-04-01 | Resolved: 2021-04-05

## 2021-04-05 LAB
ALBUMIN SERPL BCP-MCNC: 1.8 G/DL (ref 3.5–5.2)
ALP SERPL-CCNC: 94 U/L (ref 55–135)
ALT SERPL W/O P-5'-P-CCNC: 143 U/L (ref 10–44)
ANION GAP SERPL CALC-SCNC: 2 MMOL/L (ref 8–16)
AST SERPL-CCNC: 282 U/L (ref 10–40)
BASOPHILS # BLD AUTO: 0.06 K/UL (ref 0–0.2)
BASOPHILS NFR BLD: 1 % (ref 0–1.9)
BILIRUB SERPL-MCNC: 2.8 MG/DL (ref 0.1–1)
BUN SERPL-MCNC: 37 MG/DL (ref 8–23)
CALCIUM SERPL-MCNC: 7.4 MG/DL (ref 8.7–10.5)
CHLORIDE SERPL-SCNC: 96 MMOL/L (ref 95–110)
CO2 SERPL-SCNC: 34 MMOL/L (ref 23–29)
CREAT SERPL-MCNC: 1 MG/DL (ref 0.5–1.4)
DIFFERENTIAL METHOD: ABNORMAL
EOSINOPHIL # BLD AUTO: 0.1 K/UL (ref 0–0.5)
EOSINOPHIL NFR BLD: 2.3 % (ref 0–8)
ERYTHROCYTE [DISTWIDTH] IN BLOOD BY AUTOMATED COUNT: 17.8 % (ref 11.5–14.5)
EST. GFR  (AFRICAN AMERICAN): >60 ML/MIN/1.73 M^2
EST. GFR  (NON AFRICAN AMERICAN): >60 ML/MIN/1.73 M^2
GLUCOSE SERPL-MCNC: 93 MG/DL (ref 70–110)
HCT VFR BLD AUTO: 24.2 % (ref 40–54)
HGB BLD-MCNC: 8 G/DL (ref 14–18)
IMM GRANULOCYTES # BLD AUTO: 0.06 K/UL (ref 0–0.04)
IMM GRANULOCYTES NFR BLD AUTO: 1 % (ref 0–0.5)
INR PPP: 1.7 (ref 0.8–1.2)
LYMPHOCYTES # BLD AUTO: 1 K/UL (ref 1–4.8)
LYMPHOCYTES NFR BLD: 16.6 % (ref 18–48)
MAGNESIUM SERPL-MCNC: 2.1 MG/DL (ref 1.6–2.6)
MCH RBC QN AUTO: 32.3 PG (ref 27–31)
MCHC RBC AUTO-ENTMCNC: 33.1 G/DL (ref 32–36)
MCV RBC AUTO: 98 FL (ref 82–98)
MONOCYTES # BLD AUTO: 1.1 K/UL (ref 0.3–1)
MONOCYTES NFR BLD: 18.5 % (ref 4–15)
NEUTROPHILS # BLD AUTO: 3.7 K/UL (ref 1.8–7.7)
NEUTROPHILS NFR BLD: 60.6 % (ref 38–73)
NRBC BLD-RTO: 0 /100 WBC
PLATELET # BLD AUTO: 64 K/UL (ref 150–450)
PMV BLD AUTO: 10 FL (ref 9.2–12.9)
POTASSIUM SERPL-SCNC: 4.2 MMOL/L (ref 3.5–5.1)
PROT SERPL-MCNC: 5.6 G/DL (ref 6–8.4)
PROTHROMBIN TIME: 18.5 SEC (ref 9–12.5)
RBC # BLD AUTO: 2.48 M/UL (ref 4.6–6.2)
SODIUM SERPL-SCNC: 132 MMOL/L (ref 136–145)
WBC # BLD AUTO: 6.07 K/UL (ref 3.9–12.7)

## 2021-04-05 PROCEDURE — 25000003 PHARM REV CODE 250: Performed by: NURSE PRACTITIONER

## 2021-04-05 PROCEDURE — 85610 PROTHROMBIN TIME: CPT | Performed by: INTERNAL MEDICINE

## 2021-04-05 PROCEDURE — 25000003 PHARM REV CODE 250: Performed by: INTERNAL MEDICINE

## 2021-04-05 PROCEDURE — 80053 COMPREHEN METABOLIC PANEL: CPT | Performed by: INTERNAL MEDICINE

## 2021-04-05 PROCEDURE — 63600175 PHARM REV CODE 636 W HCPCS: Performed by: INTERNAL MEDICINE

## 2021-04-05 PROCEDURE — 83735 ASSAY OF MAGNESIUM: CPT | Performed by: NURSE PRACTITIONER

## 2021-04-05 PROCEDURE — S4991 NICOTINE PATCH NONLEGEND: HCPCS | Performed by: INTERNAL MEDICINE

## 2021-04-05 PROCEDURE — 85025 COMPLETE CBC W/AUTO DIFF WBC: CPT | Performed by: INTERNAL MEDICINE

## 2021-04-05 RX ORDER — DOCUSATE SODIUM 100 MG/1
100 CAPSULE, LIQUID FILLED ORAL DAILY
Refills: 0
Start: 2021-04-06

## 2021-04-05 RX ORDER — MUPIROCIN 20 MG/G
OINTMENT TOPICAL 2 TIMES DAILY
Qty: 1 TUBE | Refills: 0 | Status: SHIPPED | OUTPATIENT
Start: 2021-04-05 | End: 2021-04-08

## 2021-04-05 RX ORDER — PANTOPRAZOLE SODIUM 40 MG/1
40 TABLET, DELAYED RELEASE ORAL DAILY
Qty: 30 TABLET | Refills: 0 | Status: SHIPPED | OUTPATIENT
Start: 2021-04-06 | End: 2021-05-06

## 2021-04-05 RX ORDER — SPIRONOLACTONE 100 MG/1
100 TABLET, FILM COATED ORAL DAILY
Qty: 30 TABLET | Refills: 0 | Status: SHIPPED | OUTPATIENT
Start: 2021-04-06 | End: 2021-05-06

## 2021-04-05 RX ORDER — LANOLIN ALCOHOL/MO/W.PET/CERES
400 CREAM (GRAM) TOPICAL 2 TIMES DAILY
Refills: 0
Start: 2021-04-05

## 2021-04-05 RX ADMIN — PANTOPRAZOLE SODIUM 40 MG: 40 TABLET, DELAYED RELEASE ORAL at 09:04

## 2021-04-05 RX ADMIN — POTASSIUM CHLORIDE 20 MEQ: 1500 TABLET, EXTENDED RELEASE ORAL at 09:04

## 2021-04-05 RX ADMIN — ATORVASTATIN CALCIUM 40 MG: 40 TABLET, FILM COATED ORAL at 09:04

## 2021-04-05 RX ADMIN — Medication 1 PATCH: at 09:04

## 2021-04-05 RX ADMIN — SPIRONOLACTONE 100 MG: 100 TABLET ORAL at 09:04

## 2021-04-05 RX ADMIN — DOCUSATE SODIUM 100 MG: 100 CAPSULE, LIQUID FILLED ORAL at 09:04

## 2021-04-05 RX ADMIN — Medication 400 MG: at 09:04

## 2021-04-05 RX ADMIN — MUPIROCIN: 20 OINTMENT TOPICAL at 09:04

## 2021-04-06 ENCOUNTER — TELEPHONE (OUTPATIENT)
Dept: HEPATOLOGY | Facility: CLINIC | Age: 61
End: 2021-04-06

## 2021-04-06 ENCOUNTER — PATIENT OUTREACH (OUTPATIENT)
Dept: ADMINISTRATIVE | Facility: CLINIC | Age: 61
End: 2021-04-06

## 2021-04-08 LAB
HCV GENTYP SERPL NAA+PROBE: ABNORMAL
HCV RNA SERPL NAA+PROBE-LOG IU: 4.86 LOG (10) IU/ML
HCV RNA SERPL QL NAA+PROBE: DETECTED
HCV RNA SPEC NAA+PROBE-ACNC: ABNORMAL IU/ML

## (undated) DEVICE — SYR 30CC LUER LOCK

## (undated) DEVICE — SPONGE LAP 18X18 PREWASHED

## (undated) DEVICE — COR KNOT QUICK LOAD SINGLES

## (undated) DEVICE — SUT PROLENE 7-0 BV-1 30 BLU

## (undated) DEVICE — GAUZE SPONGE 4X4 12PLY

## (undated) DEVICE — DRESSING TRANS 4X4 TEGADERM

## (undated) DEVICE — CABLE PACING SURG SM DISP ST

## (undated) DEVICE — DRESSING TRANS 4X10 TEGADERM

## (undated) DEVICE — CATH IV CATHLON W/HUB14GAX

## (undated) DEVICE — CATH ALL PUR URTHL RR 20FR

## (undated) DEVICE — CLIPS LIGATING TITANIUM WDE SM

## (undated) DEVICE — SUT MONOCRYL 4-0 PS-1 UND

## (undated) DEVICE — SUT PROLENE 4-0 SH BLU 36IN

## (undated) DEVICE — COVER OVERHEAD SURG LT BLUE

## (undated) DEVICE — DRESSING TRANS 2X2 TEGADERM

## (undated) DEVICE — SEE MEDLINE ITEM 157117

## (undated) DEVICE — SUT VICRYL 2-0 27 CT-1

## (undated) DEVICE — CONTAINER SPECIMEN STRL 4OZ

## (undated) DEVICE — SEE MEDLINE ITEM 153199

## (undated) DEVICE — SPONGE DERMACEA 4X4IN 12PLY

## (undated) DEVICE — SOL WATER STRL IRR 1000ML

## (undated) DEVICE — SEE MEDLINE ITEM 152622

## (undated) DEVICE — SEE L#133928

## (undated) DEVICE — CLIP LIGATING MEDIUM

## (undated) DEVICE — DRESSING TELFA STRL 4X3 LF

## (undated) DEVICE — FILTER FASTSTART KIT 40 MICRON

## (undated) DEVICE — SOL LR INJ 1000ML BG

## (undated) DEVICE — APPLICATOR CHLORAPREP ORN 26ML

## (undated) DEVICE — SUT SILK 0 SUTUPAK SA86H

## (undated) DEVICE — COR KNOT QUICK LOAD

## (undated) DEVICE — DRAIN CHEST DRY SUCTION

## (undated) DEVICE — ELECTRODE REM PLYHSV RETURN 9

## (undated) DEVICE — Device

## (undated) DEVICE — SUT SILK 1 BLK BRAID SA87G

## (undated) DEVICE — DRAIN CHAN RND HUBLS 8MM 24FR

## (undated) DEVICE — SPONGE IV DRAIN 4X4 STERILE

## (undated) DEVICE — TRAY FOLEY SURESTEP 16FR

## (undated) DEVICE — SOL 9P NACL IRR PIC IL

## (undated) DEVICE — PACK OPEN HEART BR

## (undated) DEVICE — PENCIL ROCKER SWITCH 10FT CORD

## (undated) DEVICE — DRAPE SLUSH WARMER WITH DISC

## (undated) DEVICE — SUT SILK 2-0 STRANDS 30IN

## (undated) DEVICE — STOPCOCK 1 WAY PLASTIC

## (undated) DEVICE — SUT SILK 3-0 STRANDS 30IN

## (undated) DEVICE — SOL NS 1000CC

## (undated) DEVICE — SUT PROLENE 5/0 RB-1 36 IN

## (undated) DEVICE — SUT 2/0 30IN SILK BLK BRAI

## (undated) DEVICE — MANIFOLD 4 PORT

## (undated) DEVICE — SEE ITEM 86274

## (undated) DEVICE — LIGATING CLIP LARGE